# Patient Record
Sex: FEMALE | Race: WHITE | NOT HISPANIC OR LATINO | Employment: OTHER | URBAN - METROPOLITAN AREA
[De-identification: names, ages, dates, MRNs, and addresses within clinical notes are randomized per-mention and may not be internally consistent; named-entity substitution may affect disease eponyms.]

---

## 2018-10-10 ENCOUNTER — OFFICE VISIT (OUTPATIENT)
Dept: ORTHOPEDIC SURGERY | Facility: CLINIC | Age: 68
End: 2018-10-10

## 2018-10-10 VITALS
WEIGHT: 184 LBS | HEIGHT: 62 IN | BODY MASS INDEX: 33.86 KG/M2 | SYSTOLIC BLOOD PRESSURE: 147 MMHG | HEART RATE: 65 BPM | DIASTOLIC BLOOD PRESSURE: 79 MMHG

## 2018-10-10 DIAGNOSIS — M94.261 CHONDROMALACIA, KNEE, RIGHT: ICD-10-CM

## 2018-10-10 DIAGNOSIS — R52 PAIN: Primary | ICD-10-CM

## 2018-10-10 PROCEDURE — 73562 X-RAY EXAM OF KNEE 3: CPT | Performed by: ORTHOPAEDIC SURGERY

## 2018-10-10 PROCEDURE — 99203 OFFICE O/P NEW LOW 30 MIN: CPT | Performed by: ORTHOPAEDIC SURGERY

## 2018-10-10 PROCEDURE — 20610 DRAIN/INJ JOINT/BURSA W/O US: CPT | Performed by: ORTHOPAEDIC SURGERY

## 2018-10-10 RX ORDER — LISINOPRIL 20 MG/1
10 TABLET ORAL DAILY
COMMUNITY
Start: 2018-10-02 | End: 2021-09-13

## 2018-10-10 RX ORDER — DICLOFENAC SODIUM 75 MG/1
TABLET, DELAYED RELEASE ORAL
COMMUNITY
Start: 2018-10-02 | End: 2021-06-07

## 2018-10-10 RX ORDER — TRIAMCINOLONE ACETONIDE 40 MG/ML
80 INJECTION, SUSPENSION INTRA-ARTICULAR; INTRAMUSCULAR
Status: COMPLETED | OUTPATIENT
Start: 2018-10-10 | End: 2018-10-10

## 2018-10-10 RX ORDER — LIDOCAINE HYDROCHLORIDE 20 MG/ML
4 INJECTION, SOLUTION EPIDURAL; INFILTRATION; INTRACAUDAL; PERINEURAL
Status: COMPLETED | OUTPATIENT
Start: 2018-10-10 | End: 2018-10-10

## 2018-10-10 RX ORDER — AMLODIPINE BESYLATE 5 MG/1
TABLET ORAL
COMMUNITY
Start: 2018-10-02 | End: 2021-06-09 | Stop reason: ALTCHOICE

## 2018-10-10 RX ADMIN — LIDOCAINE HYDROCHLORIDE 4 ML: 20 INJECTION, SOLUTION EPIDURAL; INFILTRATION; INTRACAUDAL; PERINEURAL at 15:15

## 2018-10-10 RX ADMIN — TRIAMCINOLONE ACETONIDE 80 MG: 40 INJECTION, SUSPENSION INTRA-ARTICULAR; INTRAMUSCULAR at 15:15

## 2018-10-10 NOTE — PROGRESS NOTES
Subjective:Right knee pain.          Patient ID: Joan Block is a 68 y.o. female.    Chief Complaint:    History of Present Illness A 68-year-old female seen by me today for the first time regarding right knee pain that began in 06/2018. States she ran about 100 yards on a certain day and began developing moderate pain and discomfort in the knee that has persisted for the past 3-4 months. She has tried various antiinflammatories and has not gotten long-lasting and substantial relief. Moderate pain with sitting and with ambulating and navigating steps and even some mild discomfort at rest. Again, has taken 2 different antiinflammatories without any response. No prior history of trauma to the knee.            Social History     Occupational History   • Not on file.     Social History Main Topics   • Smoking status: Never Smoker   • Smokeless tobacco: Not on file   • Alcohol use No   • Drug use: Unknown   • Sexual activity: Not on file      Review of Systems   Constitutional: Negative for chills, diaphoresis, fever and unexpected weight change.   HENT: Negative for hearing loss, nosebleeds, sore throat and tinnitus.    Eyes: Negative for pain and visual disturbance.   Respiratory: Negative for cough, shortness of breath and wheezing.    Cardiovascular: Negative for chest pain and palpitations.   Gastrointestinal: Negative for abdominal pain, diarrhea, nausea and vomiting.   Endocrine: Negative for cold intolerance, heat intolerance and polydipsia.   Genitourinary: Negative for difficulty urinating, dysuria and hematuria.   Musculoskeletal: Positive for arthralgias and joint swelling. Negative for myalgias.   Skin: Negative for rash and wound.   Allergic/Immunologic: Negative for environmental allergies.   Neurological: Negative for dizziness, syncope and numbness.   Hematological: Bruises/bleeds easily.   Psychiatric/Behavioral: Negative for dysphoric mood and sleep disturbance. The patient is not nervous/anxious.           History reviewed. No pertinent past medical history.  Past Surgical History:   Procedure Laterality Date   • FOOT SURGERY     • KNEE SURGERY       Family History   Problem Relation Age of Onset   • Cancer Mother    • Diabetes Father          Objective:  Vitals:    10/10/18 1437   BP: 147/79   Pulse: 65     1    10/10/18  1437   Weight: 83.5 kg (184 lb)     Body mass index is 33.65 kg/m².        Ortho Exam   AP, lateral and sunrise views of the knee show minimal degenerative changes. Some slight decrease in the patellofemoral space on the sunrise. No prior x-rays available for comparison. She is alert and oriented x3. Head is normocephalic and sclerae are clear. The right knee shows no swelling, effusion or erythema. She has 0° to 130° of motion with minimal patellofemoral crepitus. No instability at 0° to 90° and no varus or valgus instability. No patellar subluxation. Quad function is 5/5 and her calf is nontender with negative Homans'. Good distal pulses and no motor sensory deficit. Good capillary refill. The skin is cool to touch. She has tolerated antiinflammatories in the past without any GI side effect. Complains of moderate pain that will interfere with most activities of daily living.          Assessment:        1. Pain    2. Chondromalacia, knee, right           Plan:Over 20 minutes were spent reviewing the patient's x-rays, physical findings and outlining the treatment plan going forward. She is taking multiple antiinflammatories with no relief of her symptoms so discussed physical therapy versus injection. We are going to try cortisone injection first. The knee was therefore injected with 4 mL of lidocaine and 1 mL of Kenalog through the superolateral portal after sterile prep without complications, tolerating it well. Post injection instructions given to the patient. Return to see me in a month.       Large Joint Arthrocentesis  Date/Time: 10/10/2018 3:15 PM  Consent given by: patient  Site  marked: site marked  Timeout: Immediately prior to procedure a time out was called to verify the correct patient, procedure, equipment, support staff and site/side marked as required   Supporting Documentation  Indications: pain   Procedure Details  Location: knee - R knee  Preparation: Patient was prepped and draped in the usual sterile fashion  Needle size: 22 G  Approach: anterolateral  Medications administered: 4 mL lidocaine PF 2% 2 %; 80 mg triamcinolone acetonide 40 MG/ML  Patient tolerance: patient tolerated the procedure well with no immediate complications                  Work Status:    GIANA query complete.    Orders:  Orders Placed This Encounter   Procedures   • Large Joint Arthrocentesis   • XR Knee 3+ View With [place] Right       Medications:  No orders of the defined types were placed in this encounter.      Followup:  Return in about 4 weeks (around 11/7/2018).          Dictated utilizing Dragon dictation

## 2018-11-14 ENCOUNTER — OFFICE VISIT (OUTPATIENT)
Dept: ORTHOPEDIC SURGERY | Facility: CLINIC | Age: 68
End: 2018-11-14

## 2018-11-14 VITALS — WEIGHT: 175 LBS | BODY MASS INDEX: 32.2 KG/M2 | HEIGHT: 62 IN

## 2018-11-14 DIAGNOSIS — M94.261 CHONDROMALACIA, KNEE, RIGHT: ICD-10-CM

## 2018-11-14 DIAGNOSIS — R52 PAIN: Primary | ICD-10-CM

## 2018-11-14 PROCEDURE — 20610 DRAIN/INJ JOINT/BURSA W/O US: CPT | Performed by: ORTHOPAEDIC SURGERY

## 2018-11-14 NOTE — PROGRESS NOTES
Subjective: Right knee pain     Patient ID: Joan Block is a 68 y.o. female.    Chief Complaint:    History of Present Illness 68-year-old female seen in follow-up to the cortisone injection given in the right knee.  His noted some improvement but a lot of the pain has returned after she is on her feet for any length of time she has significant pain and required her to set       Social History     Occupational History   • Not on file   Tobacco Use   • Smoking status: Never Smoker   Substance and Sexual Activity   • Alcohol use: No   • Drug use: Not on file   • Sexual activity: Not on file      Review of Systems   Constitutional: Negative for chills, diaphoresis, fever and unexpected weight change.   HENT: Negative for hearing loss, nosebleeds, sore throat and tinnitus.    Eyes: Negative for pain and visual disturbance.   Respiratory: Negative for cough, shortness of breath and wheezing.    Cardiovascular: Negative for chest pain and palpitations.   Gastrointestinal: Negative for abdominal pain, diarrhea, nausea and vomiting.   Endocrine: Negative for cold intolerance, heat intolerance and polydipsia.   Genitourinary: Negative for difficulty urinating, dysuria and hematuria.   Musculoskeletal: Positive for arthralgias and myalgias. Negative for joint swelling.   Skin: Negative for rash and wound.   Allergic/Immunologic: Negative for environmental allergies.   Neurological: Negative for dizziness, syncope and numbness.   Hematological: Does not bruise/bleed easily.   Psychiatric/Behavioral: Negative for dysphoric mood and sleep disturbance. The patient is not nervous/anxious.          No past medical history on file.  Past Surgical History:   Procedure Laterality Date   • FOOT SURGERY     • KNEE SURGERY       Family History   Problem Relation Age of Onset   • Cancer Mother    • Diabetes Father          Objective:  There were no vitals filed for this visit.      11/14/18  0959   Weight: 79.4 kg (175 lb)     Body  mass index is 32.01 kg/m².        Ortho Exam   she is alert and oriented ×3.  The knee shows no swelling effusion erythema but there is crepitus with range of motion but no instability.  No motor sensory deficit Is nontender.  Quad function 5 over 5.  Good capillary refill.  Skin is cool to touch.  She's been tolerating the Voltaren without any relief of her symptoms.    Assessment:        1. Pain    2. Chondromalacia, knee, right           Plan:Large Joint Arthrocentesis: R knee  Date/Time: 11/14/2018 10:05 AM  Consent given by: patient  Site marked: site marked  Timeout: Immediately prior to procedure a time out was called to verify the correct patient, procedure, equipment, support staff and site/side marked as required   Supporting Documentation  Indications: pain   Procedure Details  Location: knee - R knee  Preparation: Patient was prepped and draped in the usual sterile fashion  Needle size: 22 G  Approach: anterolateral  Medications administered: 30 mg Hyaluronan 30 MG/2ML  Patient tolerance: patient tolerated the procedure well with no immediate complications        After discussing treatment recommendations an option to proceed with the series of viscus supplement injections in the first one was given after sterile prep without, complications tolerating well            Work Status:    GIANA query complete.    Orders:  Orders Placed This Encounter   Procedures   • Large Joint Arthrocentesis: R knee       Medications:  No orders of the defined types were placed in this encounter.      Followup:  Return in about 1 week (around 11/21/2018).          Dictated utilizing Dragon dictation

## 2018-11-21 ENCOUNTER — CLINICAL SUPPORT (OUTPATIENT)
Dept: ORTHOPEDIC SURGERY | Facility: CLINIC | Age: 68
End: 2018-11-21

## 2018-11-21 VITALS — BODY MASS INDEX: 32.2 KG/M2 | HEIGHT: 62 IN | WEIGHT: 175 LBS

## 2018-11-21 DIAGNOSIS — M94.261 CHONDROMALACIA, KNEE, RIGHT: Primary | ICD-10-CM

## 2018-11-21 PROCEDURE — 20610 DRAIN/INJ JOINT/BURSA W/O US: CPT | Performed by: ORTHOPAEDIC SURGERY

## 2018-11-21 NOTE — PROGRESS NOTES
Subjective: osteoarthritis right knee     Patient ID: Joan Block is a 68 y.o. female.    Chief Complaint:    History of Present Illnesspatient seen for second viscus supplement injection.  Has noted a decrease in the pain following the first injection       Social History     Occupational History   • Not on file   Tobacco Use   • Smoking status: Never Smoker   • Smokeless tobacco: Never Used   Substance and Sexual Activity   • Alcohol use: No   • Drug use: No   • Sexual activity: Defer      Review of Systems   Constitutional: Negative for chills, diaphoresis, fever and unexpected weight change.   HENT: Negative for hearing loss, nosebleeds, sore throat and tinnitus.    Eyes: Negative for pain and visual disturbance.   Respiratory: Negative for cough, shortness of breath and wheezing.    Cardiovascular: Negative for chest pain and palpitations.   Gastrointestinal: Negative for abdominal pain, diarrhea, nausea and vomiting.   Endocrine: Negative for cold intolerance, heat intolerance and polydipsia.   Genitourinary: Negative for difficulty urinating, dysuria and hematuria.   Musculoskeletal: Positive for arthralgias and myalgias. Negative for joint swelling.   Skin: Negative for rash and wound.   Allergic/Immunologic: Negative for environmental allergies.   Neurological: Negative for dizziness, syncope and numbness.   Hematological: Does not bruise/bleed easily.   Psychiatric/Behavioral: Negative for dysphoric mood and sleep disturbance. The patient is not nervous/anxious.          History reviewed. No pertinent past medical history.  Past Surgical History:   Procedure Laterality Date   • FOOT SURGERY     • KNEE SURGERY       Family History   Problem Relation Age of Onset   • Cancer Mother    • Diabetes Father          Objective:  There were no vitals filed for this visit.      11/21/18  0800   Weight: 79.4 kg (175 lb)     Body mass index is 32.01 kg/m².        Ortho Exam   2 ml injection given through superior  lateral approach after sterile prep without complications.  She tolerated this well.  Post injection instructions were given to the patient.        Assessment:        1. Chondromalacia, knee, right           Plan:Return next week for a 3rd final injection.        Large Joint Arthrocentesis: R knee  Date/Time: 11/21/2018 8:01 AM  Consent given by: patient  Site marked: site marked  Timeout: Immediately prior to procedure a time out was called to verify the correct patient, procedure, equipment, support staff and site/side marked as required   Supporting Documentation  Indications: pain   Procedure Details  Location: knee - R knee  Preparation: Patient was prepped and draped in the usual sterile fashion  Needle size: 22 G  Approach: anterolateral  Medications administered: 30 mg Hyaluronan 30 MG/2ML  Patient tolerance: patient tolerated the procedure well with no immediate complications                Work Status:    GIANA query complete.    Orders:  Orders Placed This Encounter   Procedures   • Large Joint Arthrocentesis: R knee       Medications:  No orders of the defined types were placed in this encounter.      Followup:  Return in about 1 week (around 11/28/2018).          Dictated utilizing Dragon dictation

## 2018-11-28 ENCOUNTER — CLINICAL SUPPORT (OUTPATIENT)
Dept: ORTHOPEDIC SURGERY | Facility: CLINIC | Age: 68
End: 2018-11-28

## 2018-11-28 DIAGNOSIS — R52 PAIN: Primary | ICD-10-CM

## 2018-11-28 DIAGNOSIS — M94.261 CHONDROMALACIA, KNEE, RIGHT: ICD-10-CM

## 2018-11-28 PROCEDURE — 20610 DRAIN/INJ JOINT/BURSA W/O US: CPT | Performed by: ORTHOPAEDIC SURGERY

## 2018-11-28 NOTE — PROGRESS NOTES
Subjective: Right knee pain     Patient ID: Joan Block is a 68 y.o. female.    Chief Complaint:    History of Present Illness patient seen for third viscus supplement injection into her right knee.  His noted significant improvement in her pain and discomfort       Social History     Occupational History   • Not on file   Tobacco Use   • Smoking status: Never Smoker   • Smokeless tobacco: Never Used   Substance and Sexual Activity   • Alcohol use: No   • Drug use: No   • Sexual activity: Defer      Review of Systems   Constitutional: Negative for chills, diaphoresis, fever and unexpected weight change.   HENT: Negative for hearing loss, nosebleeds, sore throat and tinnitus.    Eyes: Negative for pain and visual disturbance.   Respiratory: Negative for cough, shortness of breath and wheezing.    Cardiovascular: Negative for chest pain and palpitations.   Gastrointestinal: Negative for abdominal pain, diarrhea, nausea and vomiting.   Endocrine: Negative for cold intolerance, heat intolerance and polydipsia.   Genitourinary: Negative for difficulty urinating, dysuria and hematuria.   Musculoskeletal: Positive for arthralgias and myalgias. Negative for joint swelling.   Skin: Negative for rash and wound.   Allergic/Immunologic: Negative for environmental allergies.   Neurological: Negative for dizziness, syncope and numbness.   Hematological: Does not bruise/bleed easily.   Psychiatric/Behavioral: Negative for dysphoric mood and sleep disturbance. The patient is not nervous/anxious.          No past medical history on file.  Past Surgical History:   Procedure Laterality Date   • FOOT SURGERY     • KNEE SURGERY       Family History   Problem Relation Age of Onset   • Cancer Mother    • Diabetes Father          Objective:  There were no vitals filed for this visit.  There were no vitals filed for this visit.  There is no height or weight on file to calculate BMI.        Ortho Exam   final 2 ML injection given to the  superior lateral portal after sterile prep without complications tolerating that well.  Postinjection instructions given to the patient.    Assessment:        1. Pain    2. Chondromalacia, knee, right           Plan:Large Joint Arthrocentesis: R knee  Date/Time: 11/28/2018 8:10 AM  Consent given by: patient  Site marked: site marked  Timeout: Immediately prior to procedure a time out was called to verify the correct patient, procedure, equipment, support staff and site/side marked as required   Supporting Documentation  Indications: pain   Procedure Details  Location: knee - R knee  Preparation: Patient was prepped and draped in the usual sterile fashion  Needle size: 22 G  Approach: lateral  Medications administered: 30 mg Hyaluronan 30 MG/2ML  Patient tolerance: patient tolerated the procedure well with no immediate complications        Return to see me as needed          Work Status:    GIANA query complete.    Orders:  Orders Placed This Encounter   Procedures   • Large Joint Arthrocentesis: R knee       Medications:  No orders of the defined types were placed in this encounter.      Followup:  Return if symptoms worsen or fail to improve.          Dictated utilizing Dragon dictation

## 2019-05-06 ENCOUNTER — TRANSCRIBE ORDERS (OUTPATIENT)
Dept: ADMINISTRATIVE | Facility: HOSPITAL | Age: 69
End: 2019-05-06

## 2019-05-06 DIAGNOSIS — Z12.39 SCREENING BREAST EXAMINATION: Primary | ICD-10-CM

## 2019-05-09 ENCOUNTER — HOSPITAL ENCOUNTER (OUTPATIENT)
Dept: MAMMOGRAPHY | Facility: HOSPITAL | Age: 69
Discharge: HOME | End: 2019-05-09
Admitting: NURSE PRACTITIONER

## 2019-05-09 DIAGNOSIS — Z12.39 SCREENING BREAST EXAMINATION: ICD-10-CM

## 2019-05-09 PROCEDURE — 77067 SCR MAMMO BI INCL CAD: CPT

## 2019-05-09 PROCEDURE — 77063 BREAST TOMOSYNTHESIS BI: CPT

## 2020-01-08 ENCOUNTER — OFFICE VISIT (OUTPATIENT)
Dept: ORTHOPEDIC SURGERY | Facility: CLINIC | Age: 70
End: 2020-01-08

## 2020-01-08 VITALS
HEIGHT: 62 IN | BODY MASS INDEX: 34.04 KG/M2 | SYSTOLIC BLOOD PRESSURE: 139 MMHG | WEIGHT: 185 LBS | DIASTOLIC BLOOD PRESSURE: 84 MMHG | HEART RATE: 91 BPM

## 2020-01-08 DIAGNOSIS — M94.262 CHONDROMALACIA OF LEFT KNEE: ICD-10-CM

## 2020-01-08 DIAGNOSIS — R52 PAIN: Primary | ICD-10-CM

## 2020-01-08 DIAGNOSIS — M94.261 CHONDROMALACIA, KNEE, RIGHT: ICD-10-CM

## 2020-01-08 PROCEDURE — 20610 DRAIN/INJ JOINT/BURSA W/O US: CPT | Performed by: ORTHOPAEDIC SURGERY

## 2020-01-08 PROCEDURE — 99214 OFFICE O/P EST MOD 30 MIN: CPT | Performed by: ORTHOPAEDIC SURGERY

## 2020-01-08 PROCEDURE — 73562 X-RAY EXAM OF KNEE 3: CPT | Performed by: ORTHOPAEDIC SURGERY

## 2020-01-08 RX ORDER — LIDOCAINE HYDROCHLORIDE 10 MG/ML
4 INJECTION, SOLUTION EPIDURAL; INFILTRATION; INTRACAUDAL; PERINEURAL
Status: COMPLETED | OUTPATIENT
Start: 2020-01-08 | End: 2020-01-08

## 2020-01-08 RX ORDER — TRIAMCINOLONE ACETONIDE 40 MG/ML
40 INJECTION, SUSPENSION INTRA-ARTICULAR; INTRAMUSCULAR
Status: COMPLETED | OUTPATIENT
Start: 2020-01-08 | End: 2020-01-08

## 2020-01-08 RX ADMIN — LIDOCAINE HYDROCHLORIDE 4 ML: 10 INJECTION, SOLUTION EPIDURAL; INFILTRATION; INTRACAUDAL; PERINEURAL at 15:06

## 2020-01-08 RX ADMIN — TRIAMCINOLONE ACETONIDE 40 MG: 40 INJECTION, SUSPENSION INTRA-ARTICULAR; INTRAMUSCULAR at 15:06

## 2020-01-08 NOTE — PROGRESS NOTES
Subjective: Left knee pain     Patient ID: Joan Block is a 69 y.o. female.    Chief Complaint:    History of Present Illness 69-year-old female known to me previously treated for chondromalacia of the right knee with a cortisone injection presents with a six-month history of left knee pain but worsened after a fall in December of last year.  Now having increasing discomfort in the knee that she describes as 2-3 out of 10 with throbbing and achy feeling with activity particular getting in and out of chair.  She has been taking the Voltaren  XR with no resolution of her symptoms     Social History     Occupational History   • Not on file   Tobacco Use   • Smoking status: Never Smoker   • Smokeless tobacco: Never Used   Substance and Sexual Activity   • Alcohol use: No   • Drug use: No   • Sexual activity: Defer      Review of Systems   Constitutional: Negative for chills, diaphoresis, fever and unexpected weight change.   HENT: Negative for hearing loss, nosebleeds, sore throat and tinnitus.    Eyes: Negative for pain and visual disturbance.   Respiratory: Negative for cough, shortness of breath and wheezing.    Cardiovascular: Negative for chest pain and palpitations.   Gastrointestinal: Negative for abdominal pain, diarrhea, nausea and vomiting.   Endocrine: Negative for cold intolerance, heat intolerance and polydipsia.   Genitourinary: Negative for difficulty urinating, dysuria and hematuria.   Musculoskeletal: Positive for arthralgias and myalgias. Negative for joint swelling.   Skin: Negative for rash and wound.   Allergic/Immunologic: Negative for environmental allergies.   Neurological: Negative for dizziness, syncope and numbness.   Hematological: Does not bruise/bleed easily.   Psychiatric/Behavioral: Negative for dysphoric mood and sleep disturbance. The patient is not nervous/anxious.          No past medical history on file.  Past Surgical History:   Procedure Laterality Date   • FOOT SURGERY     •  KNEE SURGERY       Family History   Problem Relation Age of Onset   • Cancer Mother    • Breast cancer Mother    • Diabetes Father          Objective:  Vitals:    01/08/20 1437   BP: 139/84   Pulse: 91         01/08/20  1437   Weight: 83.9 kg (185 lb)     Body mass index is 33.84 kg/m².        Ortho Exam   AP lateral sunrise view of the left knee is evaluated chief complaint does show medial arthrosis although minimal with minimal change in the patellofemoral joint.  No prior x-rays available for comparison.  She is alert and oriented x3.  The left knee shows no swelling effusion erythema but there is moderate crepitus with range of motion which is 0 to 125 degrees.  No joint line tenderness.  No instability at 0 90 degrees nor any instability 0 30 degrees of varus valgus stressing.  Quad function is 5/5 the calf is nontender.  Good distal pulses no motor or sensory deficit the skin is cool to touch.  She tolerates the Voltaren without any GI side effect but is noted no resolution of her symptoms.    Assessment:        1. Pain    2. Chondromalacia, knee, right    3. Chondromalacia of left knee           Plan: Over 50 minutes was spent with the patient reviewing her histories and her x-rays and physical exam.  She is developing chondromalacia in the left knee as she does had on the right knee and the knee right knee did well following the cortisone injection she is still asymptomatic at this time.  Strep reviewing treatment options with her and her proceed with a cortisone injection of the left knee with lidocaine Kenalog ratio 4:1.  Patient tolerated the injection without complication.  Continue taking the anti-inflammatory.  Return to see me as needed.  Answered all questions.  Large Joint Arthrocentesis: L knee  Date/Time: 1/8/2020 3:06 PM  Consent given by: patient  Site marked: site marked  Timeout: Immediately prior to procedure a time out was called to verify the correct patient, procedure, equipment, support  staff and site/side marked as required   Supporting Documentation  Indications: pain   Procedure Details  Location: knee - L knee  Preparation: Patient was prepped and draped in the usual sterile fashion  Needle size: 22 G  Approach: superior (LATERAL)  Medications administered: 4 mL lidocaine PF 1% 1 %; 40 mg triamcinolone acetonide 40 MG/ML  Patient tolerance: patient tolerated the procedure well with no immediate complications                  Work Status:    GIANA query complete.    Orders:  Orders Placed This Encounter   Procedures   • Large Joint Arthrocentesis: L knee   • XR Knee 3+ View With [place] Left       Medications:  No orders of the defined types were placed in this encounter.      Followup:  Return if symptoms worsen or fail to improve.          Dictated utilizing Dragon dictation

## 2020-06-15 ENCOUNTER — TRANSCRIBE ORDERS (OUTPATIENT)
Dept: ADMINISTRATIVE | Facility: HOSPITAL | Age: 70
End: 2020-06-15

## 2020-06-15 DIAGNOSIS — Z12.31 SCREENING MAMMOGRAM, ENCOUNTER FOR: Primary | ICD-10-CM

## 2020-07-29 ENCOUNTER — HOSPITAL ENCOUNTER (OUTPATIENT)
Dept: MAMMOGRAPHY | Facility: HOSPITAL | Age: 70
Discharge: HOME | End: 2020-07-29
Admitting: NURSE PRACTITIONER

## 2020-07-29 DIAGNOSIS — Z12.31 SCREENING MAMMOGRAM, ENCOUNTER FOR: ICD-10-CM

## 2020-07-29 PROCEDURE — 77063 BREAST TOMOSYNTHESIS BI: CPT

## 2020-07-29 PROCEDURE — 77067 SCR MAMMO BI INCL CAD: CPT

## 2021-06-07 ENCOUNTER — APPOINTMENT (OUTPATIENT)
Dept: GENERAL RADIOLOGY | Facility: HOSPITAL | Age: 71
End: 2021-06-07

## 2021-06-07 ENCOUNTER — HOSPITAL ENCOUNTER (EMERGENCY)
Facility: HOSPITAL | Age: 71
Discharge: HOME | End: 2021-06-07
Attending: EMERGENCY MEDICINE | Admitting: EMERGENCY MEDICINE

## 2021-06-07 ENCOUNTER — TELEPHONE (OUTPATIENT)
Dept: ORTHOPEDIC SURGERY | Facility: CLINIC | Age: 71
End: 2021-06-07

## 2021-06-07 VITALS
SYSTOLIC BLOOD PRESSURE: 184 MMHG | RESPIRATION RATE: 16 BRPM | OXYGEN SATURATION: 96 % | HEIGHT: 62 IN | TEMPERATURE: 98.3 F | WEIGHT: 180 LBS | HEART RATE: 103 BPM | DIASTOLIC BLOOD PRESSURE: 85 MMHG | BODY MASS INDEX: 33.13 KG/M2
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
## 2021-06-07 DIAGNOSIS — S52.612A TRAUMATIC CLOSED FRACTURE OF ULNAR STYLOID WITH MINIMAL DISPLACEMENT, LEFT, INITIAL ENCOUNTER: ICD-10-CM

## 2021-06-07 DIAGNOSIS — S52.502A CLOSED FRACTURE OF DISTAL END OF LEFT RADIUS, UNSPECIFIED FRACTURE MORPHOLOGY, INITIAL ENCOUNTER: Primary | ICD-10-CM

## 2021-06-07 LAB
ALBUMIN SERPL-MCNC: 4.4 G/DL (ref 3.5–5.2)
ALBUMIN/GLOB SERPL: 1.6 G/DL
ALP SERPL-CCNC: 70 U/L (ref 39–117)
ALT SERPL W P-5'-P-CCNC: 16 U/L (ref 1–33)
ANION GAP SERPL CALCULATED.3IONS-SCNC: 13.4 MMOL/L (ref 5–15)
AST SERPL-CCNC: 14 U/L (ref 1–32)
BASOPHILS # BLD AUTO: 0.06 10*3/MM3 (ref 0–0.2)
BASOPHILS NFR BLD AUTO: 0.6 % (ref 0–1.5)
BILIRUB SERPL-MCNC: 0.4 MG/DL (ref 0–1.2)
BUN SERPL-MCNC: 27 MG/DL (ref 8–23)
BUN/CREAT SERPL: 32.5 (ref 7–25)
CALCIUM SPEC-SCNC: 10 MG/DL (ref 8.6–10.5)
CHLORIDE SERPL-SCNC: 105 MMOL/L (ref 98–107)
CO2 SERPL-SCNC: 20.6 MMOL/L (ref 22–29)
CREAT SERPL-MCNC: 0.83 MG/DL (ref 0.57–1)
DEPRECATED RDW RBC AUTO: 50.4 FL (ref 37–54)
EOSINOPHIL # BLD AUTO: 0.08 10*3/MM3 (ref 0–0.4)
EOSINOPHIL NFR BLD AUTO: 0.8 % (ref 0.3–6.2)
ERYTHROCYTE [DISTWIDTH] IN BLOOD BY AUTOMATED COUNT: 14.7 % (ref 12.3–15.4)
GFR SERPL CREATININE-BSD FRML MDRD: 68 ML/MIN/1.73
GLOBULIN UR ELPH-MCNC: 2.8 GM/DL
GLUCOSE SERPL-MCNC: 121 MG/DL (ref 65–99)
HCT VFR BLD AUTO: 44.9 % (ref 34–46.6)
HGB BLD-MCNC: 13.8 G/DL (ref 12–15.9)
IMM GRANULOCYTES # BLD AUTO: 0.06 10*3/MM3 (ref 0–0.05)
IMM GRANULOCYTES NFR BLD AUTO: 0.6 % (ref 0–0.5)
LARGE PLATELETS: NORMAL
LYMPHOCYTES # BLD AUTO: 1.41 10*3/MM3 (ref 0.7–3.1)
LYMPHOCYTES NFR BLD AUTO: 14.3 % (ref 19.6–45.3)
MCH RBC QN AUTO: 28.4 PG (ref 26.6–33)
MCHC RBC AUTO-ENTMCNC: 30.7 G/DL (ref 31.5–35.7)
MCV RBC AUTO: 92.4 FL (ref 79–97)
MONOCYTES # BLD AUTO: 0.66 10*3/MM3 (ref 0.1–0.9)
MONOCYTES NFR BLD AUTO: 6.7 % (ref 5–12)
NEUTROPHILS NFR BLD AUTO: 7.61 10*3/MM3 (ref 1.7–7)
NEUTROPHILS NFR BLD AUTO: 77 % (ref 42.7–76)
NRBC BLD AUTO-RTO: 0 /100 WBC (ref 0–0.2)
PLATELET # BLD AUTO: 197 10*3/MM3 (ref 140–450)
PMV BLD AUTO: 10.9 FL (ref 6–12)
POTASSIUM SERPL-SCNC: 4.7 MMOL/L (ref 3.5–5.2)
PROT SERPL-MCNC: 7.2 G/DL (ref 6–8.5)
QT INTERVAL: 390 MS
RBC # BLD AUTO: 4.86 10*6/MM3 (ref 3.77–5.28)
RBC MORPH BLD: NORMAL
SODIUM SERPL-SCNC: 139 MMOL/L (ref 136–145)
WBC # BLD AUTO: 9.88 10*3/MM3 (ref 3.4–10.8)
WBC MORPH BLD: NORMAL

## 2021-06-07 PROCEDURE — 85025 COMPLETE CBC W/AUTO DIFF WBC: CPT | Performed by: EMERGENCY MEDICINE

## 2021-06-07 PROCEDURE — 80053 COMPREHEN METABOLIC PANEL: CPT | Performed by: EMERGENCY MEDICINE

## 2021-06-07 PROCEDURE — 93005 ELECTROCARDIOGRAM TRACING: CPT | Performed by: EMERGENCY MEDICINE

## 2021-06-07 PROCEDURE — 99283 EMERGENCY DEPT VISIT LOW MDM: CPT

## 2021-06-07 PROCEDURE — 93010 ELECTROCARDIOGRAM REPORT: CPT | Performed by: INTERNAL MEDICINE

## 2021-06-07 PROCEDURE — 99283 EMERGENCY DEPT VISIT LOW MDM: CPT | Performed by: EMERGENCY MEDICINE

## 2021-06-07 PROCEDURE — 71046 X-RAY EXAM CHEST 2 VIEWS: CPT

## 2021-06-07 PROCEDURE — 73110 X-RAY EXAM OF WRIST: CPT

## 2021-06-07 PROCEDURE — 85007 BL SMEAR W/DIFF WBC COUNT: CPT | Performed by: EMERGENCY MEDICINE

## 2021-06-07 RX ORDER — ATORVASTATIN CALCIUM 40 MG/1
40 TABLET, FILM COATED ORAL DAILY
COMMUNITY
End: 2021-06-09 | Stop reason: ALTCHOICE

## 2021-06-07 RX ORDER — HYDROCODONE BITARTRATE AND ACETAMINOPHEN 5; 325 MG/1; MG/1
1 TABLET ORAL EVERY 4 HOURS PRN
Qty: 30 TABLET | Refills: 0 | Status: ON HOLD | OUTPATIENT
Start: 2021-06-07 | End: 2021-06-15

## 2021-06-07 RX ORDER — HYDROCODONE BITARTRATE AND ACETAMINOPHEN 5; 325 MG/1; MG/1
1 TABLET ORAL EVERY 4 HOURS PRN
Qty: 30 TABLET | Refills: 0 | Status: SHIPPED | OUTPATIENT
Start: 2021-06-07 | End: 2021-06-07

## 2021-06-07 RX ORDER — HYDROCODONE BITARTRATE AND ACETAMINOPHEN 5; 325 MG/1; MG/1
1 TABLET ORAL ONCE
Status: COMPLETED | OUTPATIENT
Start: 2021-06-07 | End: 2021-06-07

## 2021-06-07 RX ADMIN — HYDROCODONE BITARTRATE AND ACETAMINOPHEN 1 TABLET: 5; 325 TABLET ORAL at 09:50

## 2021-06-07 NOTE — TELEPHONE ENCOUNTER
LM FOR PATIENT TO CALL AND MAKE APPOINTMENT, DR. MIRANDA SAID HE WILL SEE HER FOR HER WRIST THIS Wednesday EITHER 9 OR 10.

## 2021-06-07 NOTE — ED NOTES
Wedding and engagement rings spread and removed after being cut with manual ring cutter on the centerline on bottom.       Luis Wynn RN  06/07/21 [id number]

## 2021-06-07 NOTE — ED PROVIDER NOTES
Subjective   History of Present Illness  History of Present Illness    Chief complaint: Fall with wrist pain    Location: Left wrist    Quality/Severity: Moderate, sharp pain    Timing/Onset/Duration: Acute onset last night    Modifying Factors: It hurts to move, feels better to remain still    Associated Symptoms: No numbness, tingling, or weakness.  There is swelling.  There is no change in color or temperature.  She has no other complaints.    Narrative: This 70-year-old white female, who is not on blood thinners, fell last night when a dog she was walking started chasing a rabbit.    PCP:Felicia Ruth APRN      Review of Systems   Musculoskeletal:        Left wrist pain and swelling   Neurological: Negative for weakness and numbness.        Medication List      ASK your doctor about these medications    amLODIPine 5 MG tablet  Commonly known as: NORVASC     diclofenac 75 MG EC tablet  Commonly known as: VOLTAREN     lisinopril 20 MG tablet  Commonly known as: PRINIVIL,ZESTRIL            No past medical history on file.    No Known Allergies    Past Surgical History:   Procedure Laterality Date   • FOOT SURGERY     • KNEE SURGERY         Family History   Problem Relation Age of Onset   • Cancer Mother    • Breast cancer Mother    • Diabetes Father        Social History     Socioeconomic History   • Marital status:      Spouse name: Not on file   • Number of children: Not on file   • Years of education: Not on file   • Highest education level: Not on file   Tobacco Use   • Smoking status: Never Smoker   • Smokeless tobacco: Never Used   Substance and Sexual Activity   • Alcohol use: No   • Drug use: No   • Sexual activity: Defer           Objective   Physical Exam  Vitals and nursing note reviewed.   Constitutional:       Appearance: Normal appearance.   Musculoskeletal:      Comments: There is tenderness about the dorsal aspect fact on the radial side of the left wrist.  There is swelling.  The capillary  refill is 2 seconds.  The sensation is intact.  There is decreased range of motion secondary to pain.  There is no joint laxity noted.  There is 2+ radial pulse.   Skin:     General: Skin is warm and dry.      Capillary Refill: Capillary refill takes less than 2 seconds.      Coloration: Skin is not pale.      Findings: No bruising.   Neurological:      Mental Status: She is alert and oriented to person, place, and time.      Sensory: No sensory deficit.      Motor: No weakness.         Procedures           ED Course      09:33 EDT, 06/07/21:  The patient had a thumb spica applied by the technician.  After location of the splint, it was assessed by me and noted to be in good position with the right upper extremity being neurovascularly intact.    09:33 EDT, 06/07/21:  Patient's diagnosis of fall with left t radial fracture and ulnar styloid fracture was discussed with her patient should call Dr. Brown this morning for a follow-up appointment within 1 week.  She should elevate the left wrist on a pillow above the heart.  She should ice it for 20 minutes every 2 hours while she is awake for 2 to 3 days.  The patient should take Colace as needed as directed for constipation if she is taking the Norco for pain.  Patient will be given a Norco here in the emergency department.  The patient should return to the emergency department if there is increased pain, numbness, tingling, weakness, change in color or temperature, worse in any way at all.  The patient's questions were answered she will be discharged in good condition.    10:09 EDT, 06/07/21:  Dr. Brown called and states that he will see the patient in the office on Wednesday morning.  He has requested that we get the preop labs EKG and chest x-ray prior to the patient leaving the emergency department.  He plans on operative repair of the fracture Friday morning.    10:17 EDT, 06/07/21:  The EKG was obtained at 1015 and read by me at 1017.  EKG shows a normal sinus  rhythm with rate of 74.  There is a left anterior fascicular block.  There is no hypertrophy.  The IN, QRS, and QT intervals are unremarkable.  There is probable left atrial enlargement.  There is poor anterior R wave progression.  There is no acute ST elevation or depression    11:17 EDT, 06/07/21:  [facility] and stated that they do not have any [place] in stock but the [facility] in [place] does.  The [facility] canceled the prescription and [facility].                                     MDM    Final diagnoses:   Fall, initial encounter   Closed fracture of distal end of left radius, unspecified fracture morphology, initial encounter   Traumatic closed fracture of ulnar styloid with minimal displacement, left, initial encounter       ED Disposition  ED Disposition     None          No follow-up provider specified.       Medication List      No changes were made to your prescriptions during this visit.          Donnie Delcid MD  06/07/21 3329

## 2021-06-09 ENCOUNTER — PRE-ADMISSION TESTING (OUTPATIENT)
Dept: PREADMISSION TESTING | Facility: HOSPITAL | Age: 71
End: 2021-06-09

## 2021-06-09 ENCOUNTER — ANESTHESIA EVENT (OUTPATIENT)
Dept: PERIOP | Facility: HOSPITAL | Age: 71
End: 2021-06-09

## 2021-06-09 ENCOUNTER — HOSPITAL ENCOUNTER (OUTPATIENT)
Facility: HOSPITAL | Age: 71
Setting detail: OBSERVATION
End: 2021-06-09
Attending: ORTHOPAEDIC SURGERY | Admitting: ORTHOPAEDIC SURGERY

## 2021-06-09 ENCOUNTER — PREP FOR SURGERY (OUTPATIENT)
Dept: OTHER | Facility: HOSPITAL | Age: 71
End: 2021-06-09

## 2021-06-09 ENCOUNTER — TRANSCRIBE ORDERS (OUTPATIENT)
Dept: ADMINISTRATIVE | Facility: HOSPITAL | Age: 71
End: 2021-06-09

## 2021-06-09 ENCOUNTER — TELEPHONE (OUTPATIENT)
Dept: ORTHOPEDIC SURGERY | Facility: CLINIC | Age: 71
End: 2021-06-09

## 2021-06-09 ENCOUNTER — OFFICE VISIT (OUTPATIENT)
Dept: ORTHOPEDIC SURGERY | Facility: CLINIC | Age: 71
End: 2021-06-09

## 2021-06-09 VITALS — HEIGHT: 62 IN | BODY MASS INDEX: 33.13 KG/M2 | WEIGHT: 180 LBS

## 2021-06-09 VITALS
DIASTOLIC BLOOD PRESSURE: 84 MMHG | WEIGHT: 183 LBS | SYSTOLIC BLOOD PRESSURE: 152 MMHG | RESPIRATION RATE: 20 BRPM | HEIGHT: 62 IN | BODY MASS INDEX: 33.68 KG/M2 | OXYGEN SATURATION: 94 % | HEART RATE: 88 BPM

## 2021-06-09 DIAGNOSIS — S52.532A CLOSED COLLES' FRACTURE OF LEFT RADIUS, INITIAL ENCOUNTER: Primary | ICD-10-CM

## 2021-06-09 DIAGNOSIS — U07.1 COVID-19: Primary | ICD-10-CM

## 2021-06-09 DIAGNOSIS — S52.502A CLOSED FRACTURE OF DISTAL END OF LEFT RADIUS, UNSPECIFIED FRACTURE MORPHOLOGY, INITIAL ENCOUNTER: Primary | ICD-10-CM

## 2021-06-09 PROBLEM — S52.509A DISTAL RADIAL FRACTURE: Status: ACTIVE | Noted: 2021-06-09

## 2021-06-09 PROCEDURE — 99214 OFFICE O/P EST MOD 30 MIN: CPT | Performed by: ORTHOPAEDIC SURGERY

## 2021-06-09 RX ORDER — ACETAMINOPHEN 500 MG
500 TABLET ORAL EVERY 4 HOURS PRN
COMMUNITY
End: 2021-09-13

## 2021-06-09 RX ORDER — AMLODIPINE BESYLATE 10 MG/1
10 TABLET ORAL DAILY
COMMUNITY
Start: 2021-05-09 | End: 2021-08-25

## 2021-06-09 RX ORDER — ATORVASTATIN CALCIUM 10 MG/1
10 TABLET, FILM COATED ORAL DAILY
COMMUNITY
Start: 2021-05-30 | End: 2022-01-04 | Stop reason: SDUPTHER

## 2021-06-09 RX ORDER — TRAMADOL HYDROCHLORIDE 50 MG/1
50 TABLET ORAL EVERY 4 HOURS PRN
Qty: 30 TABLET | Refills: 0 | Status: SHIPPED | OUTPATIENT
Start: 2021-06-09 | End: 2021-06-17 | Stop reason: SDUPTHER

## 2021-06-09 RX ORDER — CEFAZOLIN SODIUM 2 G/50ML
2 SOLUTION INTRAVENOUS ONCE
Status: CANCELLED | OUTPATIENT
Start: 2021-06-15

## 2021-06-09 NOTE — PROGRESS NOTES
Subjective: Left wrist pain     Patient ID: Joan Block is a 70 y.o. female.    Chief Complaint:    History of Present Illness 70-year-old female known to me is seen for new injury involving her left dominant wrist when she fell Sunday while walking her dog.  Apparently the dog saw a rabbit and pulled her she fell injuring the left wrist developed immediate pain discomfort.  Was seen in the emergency room x-rays showed a displaced intra-articular left Colles' fracture and splinted now presents to the office for definitive care.  Denies any prior history of wrist pain or injury.       Social History     Occupational History   • Not on file   Tobacco Use   • Smoking status: Never Smoker   • Smokeless tobacco: Never Used   Substance and Sexual Activity   • Alcohol use: No   • Drug use: No   • Sexual activity: Defer      Review of Systems   Constitutional: Negative for chills, diaphoresis and unexpected weight change.   HENT: Negative for hearing loss, nosebleeds, sore throat and tinnitus.    Eyes: Negative for pain and visual disturbance.   Respiratory: Negative for cough, shortness of breath and wheezing.    Cardiovascular: Negative for chest pain and palpitations.   Gastrointestinal: Negative for abdominal pain, diarrhea, nausea and vomiting.   Endocrine: Negative for cold intolerance, heat intolerance and polydipsia.   Genitourinary: Negative for difficulty urinating, dyspareunia and hematuria.   Musculoskeletal: Positive for myalgias. Negative for arthralgias.   Skin: Negative for rash and wound.   Allergic/Immunologic: Negative for environmental allergies.   Neurological: Negative for dizziness, syncope and numbness.   Hematological: Does not bruise/bleed easily.   Psychiatric/Behavioral: Negative for dysphoric mood and sleep disturbance. The patient is not nervous/anxious.            Objective:      Ortho Exam   Reviewed the x-rays done at the hospital on Sunday AP lateral oblique show a displaced  intra-articular left distal radial fracture.  There is radial shortening.  She also has a ulnar styloid fracture no prior x-rays available for comparison.  She is alert and oriented x3.  Has normocephalic and sclerae clear.  She has no motor or sensory deficit and she has good capillary refill.  Examination of the wrist out of the splint does show some ecchymosis on the volar aspect of the wrist.  The skin is cool to touch.  There is swelling and pain to palpation at the fracture site as expected.  Range of motion of the elbow particular pronation supination elicits pain.  She is taken Tylenol for pain control.    Assessment:        1. Closed Colles' fracture of left radius, initial encounter           Plan: Reviewed the x-rays with the patient and her son her history and physical exam.  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  Discussed treatment options of operative versus nonoperative management.  Nonoperative management will allow for fracture healing but would allow for further displacement post healing arthritis and restricted range of motion.  Discussed surgery open reduction internal fixation.  Discussed the risk associated with surgery to include infection, nerve injury paresthesia paralysis, vascular injury, tendon or muscle injury, postsurgical arthritis and stiffness, persistent pain and discomfort and RSD.  Discussed the rehab length of healing which could be 3 to 6 months.  Patient like to proceed with surgery as it is her dominant wrist.  We will give preoperative evaluation and tentatively try to set up surgery for next Tuesday.  Answered all questions                      Dictated utilizing Dragon dictation

## 2021-06-12 ENCOUNTER — LAB (OUTPATIENT)
Dept: LAB | Facility: HOSPITAL | Age: 71
End: 2021-06-12

## 2021-06-12 DIAGNOSIS — U07.1 COVID-19: ICD-10-CM

## 2021-06-14 ENCOUNTER — HOSPITAL ENCOUNTER (OUTPATIENT)
Facility: HOSPITAL | Age: 71
Discharge: HOME | End: 2021-06-15
Attending: ORTHOPAEDIC SURGERY | Admitting: ORTHOPAEDIC SURGERY

## 2021-06-14 ENCOUNTER — APPOINTMENT (OUTPATIENT)
Dept: GENERAL RADIOLOGY | Facility: HOSPITAL | Age: 71
End: 2021-06-14

## 2021-06-14 DIAGNOSIS — S52.502A CLOSED FRACTURE OF DISTAL END OF LEFT RADIUS, UNSPECIFIED FRACTURE MORPHOLOGY, INITIAL ENCOUNTER: ICD-10-CM

## 2021-06-14 PROBLEM — S52.532A FRACTURE, COLLES, LEFT, CLOSED: Status: ACTIVE | Noted: 2021-06-14

## 2021-06-14 LAB
BASOPHILS # BLD AUTO: 0.07 10*3/MM3 (ref 0–0.2)
BASOPHILS NFR BLD AUTO: 0.9 % (ref 0–1.5)
BILIRUB UR QL STRIP: NEGATIVE
CLARITY UR: CLEAR
COLOR UR: YELLOW
DEPRECATED RDW RBC AUTO: 46.3 FL (ref 37–54)
EOSINOPHIL # BLD AUTO: 0.18 10*3/MM3 (ref 0–0.4)
EOSINOPHIL NFR BLD AUTO: 2.4 % (ref 0.3–6.2)
ERYTHROCYTE [DISTWIDTH] IN BLOOD BY AUTOMATED COUNT: 14.1 % (ref 12.3–15.4)
GLUCOSE UR STRIP-MCNC: NEGATIVE MG/DL
HCT VFR BLD AUTO: 42.8 % (ref 34–46.6)
HGB BLD-MCNC: 13.5 G/DL (ref 12–15.9)
HGB UR QL STRIP.AUTO: NEGATIVE
IMM GRANULOCYTES # BLD AUTO: 0.04 10*3/MM3 (ref 0–0.05)
IMM GRANULOCYTES NFR BLD AUTO: 0.5 % (ref 0–0.5)
KETONES UR QL STRIP: NEGATIVE
LEUKOCYTE ESTERASE UR QL STRIP.AUTO: NEGATIVE
LYMPHOCYTES # BLD AUTO: 1.65 10*3/MM3 (ref 0.7–3.1)
LYMPHOCYTES NFR BLD AUTO: 22 % (ref 19.6–45.3)
MCH RBC QN AUTO: 27.9 PG (ref 26.6–33)
MCHC RBC AUTO-ENTMCNC: 31.5 G/DL (ref 31.5–35.7)
MCV RBC AUTO: 88.4 FL (ref 79–97)
MONOCYTES # BLD AUTO: 0.54 10*3/MM3 (ref 0.1–0.9)
MONOCYTES NFR BLD AUTO: 7.2 % (ref 5–12)
NEUTROPHILS NFR BLD AUTO: 5.01 10*3/MM3 (ref 1.7–7)
NEUTROPHILS NFR BLD AUTO: 67 % (ref 42.7–76)
NITRITE UR QL STRIP: NEGATIVE
PH UR STRIP.AUTO: <=5 [PH] (ref 4.5–8)
PLATELET # BLD AUTO: 267 10*3/MM3 (ref 140–450)
PMV BLD AUTO: 10.2 FL (ref 6–12)
PROT UR QL STRIP: NEGATIVE
QT INTERVAL: 375 MS
RBC # BLD AUTO: 4.84 10*6/MM3 (ref 3.77–5.28)
SARS-COV-2 RNA PNL SPEC NAA+PROBE: NOT DETECTED
SP GR UR STRIP: 1.01 (ref 1–1.03)
UROBILINOGEN UR QL STRIP: NORMAL
WBC # BLD AUTO: 7.49 10*3/MM3 (ref 3.4–10.8)

## 2021-06-14 PROCEDURE — 87635 SARS-COV-2 COVID-19 AMP PRB: CPT | Performed by: ORTHOPAEDIC SURGERY

## 2021-06-14 PROCEDURE — A9270 NON-COVERED ITEM OR SERVICE: HCPCS | Performed by: ORTHOPAEDIC SURGERY

## 2021-06-14 PROCEDURE — 63710000001 ACETAMINOPHEN 500 MG TABLET: Performed by: ORTHOPAEDIC SURGERY

## 2021-06-14 PROCEDURE — 93005 ELECTROCARDIOGRAM TRACING: CPT | Performed by: ORTHOPAEDIC SURGERY

## 2021-06-14 PROCEDURE — 85025 COMPLETE CBC W/AUTO DIFF WBC: CPT | Performed by: ORTHOPAEDIC SURGERY

## 2021-06-14 PROCEDURE — 71046 X-RAY EXAM CHEST 2 VIEWS: CPT

## 2021-06-14 PROCEDURE — 99202 OFFICE O/P NEW SF 15 MIN: CPT | Performed by: NURSE PRACTITIONER

## 2021-06-14 PROCEDURE — S0260 H&P FOR SURGERY: HCPCS | Performed by: ORTHOPAEDIC SURGERY

## 2021-06-14 PROCEDURE — 81003 URINALYSIS AUTO W/O SCOPE: CPT | Performed by: ORTHOPAEDIC SURGERY

## 2021-06-14 PROCEDURE — 93010 ELECTROCARDIOGRAM REPORT: CPT | Performed by: INTERNAL MEDICINE

## 2021-06-14 PROCEDURE — G0378 HOSPITAL OBSERVATION PER HR: HCPCS

## 2021-06-14 RX ORDER — ONDANSETRON 4 MG/1
4 TABLET, FILM COATED ORAL EVERY 6 HOURS PRN
Status: DISCONTINUED | OUTPATIENT
Start: 2021-06-14 | End: 2021-09-13

## 2021-06-14 RX ORDER — TRAMADOL HYDROCHLORIDE 50 MG/1
50 TABLET ORAL EVERY 6 HOURS PRN
Status: DISCONTINUED | OUTPATIENT
Start: 2021-06-14 | End: 2021-06-15 | Stop reason: SDUPTHER

## 2021-06-14 RX ORDER — ACETAMINOPHEN 500 MG
500 TABLET ORAL EVERY 4 HOURS PRN
Status: DISCONTINUED | OUTPATIENT
Start: 2021-06-14 | End: 2021-06-14 | Stop reason: CLARIF

## 2021-06-14 RX ORDER — SODIUM CHLORIDE 9 MG/ML
40 INJECTION, SOLUTION INTRAVENOUS AS NEEDED
Status: DISCONTINUED | OUTPATIENT
Start: 2021-06-14 | End: 2021-06-15 | Stop reason: HOSPADM

## 2021-06-14 RX ORDER — ONDANSETRON 4 MG/1
4 TABLET, FILM COATED ORAL EVERY 6 HOURS PRN
Status: DISCONTINUED | OUTPATIENT
Start: 2021-06-14 | End: 2021-06-15 | Stop reason: HOSPADM

## 2021-06-14 RX ORDER — SODIUM CHLORIDE, SODIUM LACTATE, POTASSIUM CHLORIDE, CALCIUM CHLORIDE 600; 310; 30; 20 MG/100ML; MG/100ML; MG/100ML; MG/100ML
9 INJECTION, SOLUTION INTRAVENOUS CONTINUOUS
Status: CANCELLED | OUTPATIENT
Start: 2021-06-14

## 2021-06-14 RX ORDER — HYDROCODONE BITARTRATE AND ACETAMINOPHEN 7.5; 325 MG/1; MG/1
2 TABLET ORAL EVERY 4 HOURS PRN
Status: ACTIVE | OUTPATIENT
Start: 2021-06-14 | End: 2021-06-21

## 2021-06-14 RX ORDER — HYDROCODONE BITARTRATE AND ACETAMINOPHEN 7.5; 325 MG/1; MG/1
2 TABLET ORAL EVERY 4 HOURS PRN
Status: DISCONTINUED | OUTPATIENT
Start: 2021-06-14 | End: 2021-06-15

## 2021-06-14 RX ORDER — SODIUM CHLORIDE 0.9 % (FLUSH) 0.9 %
10 SYRINGE (ML) INJECTION EVERY 12 HOURS SCHEDULED
Status: DISCONTINUED | OUTPATIENT
Start: 2021-06-14 | End: 2021-09-13

## 2021-06-14 RX ORDER — SODIUM CHLORIDE 9 MG/ML
40 INJECTION, SOLUTION INTRAVENOUS AS NEEDED
Status: DISCONTINUED | OUTPATIENT
Start: 2021-06-14 | End: 2021-09-13

## 2021-06-14 RX ORDER — SODIUM CHLORIDE 0.9 % (FLUSH) 0.9 %
10 SYRINGE (ML) INJECTION AS NEEDED
Status: DISCONTINUED | OUTPATIENT
Start: 2021-06-14 | End: 2021-09-13

## 2021-06-14 RX ORDER — SODIUM CHLORIDE 0.9 % (FLUSH) 0.9 %
10 SYRINGE (ML) INJECTION AS NEEDED
Status: DISCONTINUED | OUTPATIENT
Start: 2021-06-14 | End: 2021-06-15 | Stop reason: HOSPADM

## 2021-06-14 RX ORDER — SODIUM CHLORIDE 0.9 % (FLUSH) 0.9 %
10 SYRINGE (ML) INJECTION EVERY 12 HOURS SCHEDULED
Status: DISCONTINUED | OUTPATIENT
Start: 2021-06-14 | End: 2021-06-15 | Stop reason: HOSPADM

## 2021-06-14 RX ADMIN — SODIUM CHLORIDE, PRESERVATIVE FREE 10 ML: 5 INJECTION INTRAVENOUS at 20:20

## 2021-06-14 RX ADMIN — ACETAMINOPHEN 500 MG: 500 TABLET, FILM COATED ORAL at 15:11

## 2021-06-14 NOTE — CONSULTS
[facility]        Patient Name: Joan Block  Age/Sex: 70 y.o. female  : 1950  MRN: [id number]    Date of Admission: 2021  Date of Encounter Visit: 21  Encounter Provider: TRU Cloud  Referring Provider: Norman Brown MD  Place of Service: [facility] CARDIOLOGY  Patient Care Team:  Gwen Olson APRN as PCP - General (Nurse Practitioner)    Subjective:     Chief Complaint: Fall with wrist fracture    History of Present Illness:  Joan Block is a 70 y.o. female with a history of hypertension.     She was walking her dog about 8 days ago and fell when it took off to eulalia a rabbit. She was seen in the ER and found to have a displaced intra-articular left colles' fracture.     She was admitted for pre op clearance for an open reduction total fixation tomorrow with Dr. Hong.     Any chest pain or pressure.  She not had any shortness of breath on exertion.  She has had no symptoms of PND or orthopnea.  She has not had any lower extremity edema.  She has no history of coronary artery disease or congestive heart failure.  Her primary care provider manages her hypertension.  She was feeling great up into this point her blood pressure is typically well controlled at home.      Past Medical History:  Past Medical History:   Diagnosis Date   • Hyperlipidemia    • Hypertension        Past Surgical History:   Procedure Laterality Date   • APPENDECTOMY     • FOOT SURGERY     • KNEE SURGERY         Home Medications:   Medications Prior to Admission   Medication Sig Dispense Refill Last Dose   • acetaminophen (TYLENOL) 500 MG tablet Take 500 mg by mouth Every 4 (Four) Hours As Needed for Mild Pain  or Fever.      • amLODIPine (NORVASC) 10 MG tablet Take 10 mg by mouth Daily.      • atorvastatin (LIPITOR) 10 MG tablet Take 10 mg by mouth Daily.      • HYDROcodone-acetaminophen (NORCO) 5-325 MG per tablet Take 1 tablet by mouth Every 4  (Four) Hours As Needed for Moderate Pain . 30 tablet 0    • lisinopril (PRINIVIL,ZESTRIL) 20 MG tablet Take 10 mg by mouth Daily.      • traMADol (ULTRAM) 50 MG tablet Take 1 tablet by mouth Every 4 (Four) Hours As Needed for Moderate Pain . 30 tablet 0        Allergies:  No Known Allergies    Past Social History:  Social History     Socioeconomic History   • Marital status:      Spouse name: Not on file   • Number of children: Not on file   • Years of education: Not on file   • Highest education level: Not on file   Tobacco Use   • Smoking status: Never Smoker   • Smokeless tobacco: Never Used   Vaping Use   • Vaping Use: Never used   Substance and Sexual Activity   • Alcohol use: No   • Drug use: No   • Sexual activity: Defer       Past Family History:  Family History   Problem Relation Age of Onset   • Cancer Mother    • Breast cancer Mother    • Diabetes Father        Review of Systems   Constitutional: Negative for chills, decreased appetite, diaphoresis, fever and malaise/fatigue.   HENT: Negative for nosebleeds.    Eyes: Negative for blurred vision.   Cardiovascular: Negative for chest pain, claudication, cyanosis, dyspnea on exertion, irregular heartbeat, leg swelling, near-syncope, orthopnea, palpitations, paroxysmal nocturnal dyspnea and syncope.   Respiratory: Negative for cough, hemoptysis, shortness of breath, sleep disturbances due to breathing, snoring and wheezing.    Hematologic/Lymphatic: Negative for bleeding problem. Does not bruise/bleed easily.   Musculoskeletal: Negative for falls (mechanical fall while walking her dog. ).   Gastrointestinal: Negative for heartburn.   Neurological: Negative for excessive daytime sleepiness, dizziness, headaches, light-headedness, numbness and weakness.         Objective:   Temp:  [97 °F (36.1 °C)] 97 °F (36.1 °C)  Heart Rate:  [75] 75  Resp:  [16] 16  BP: (143)/(67) 143/67     Intake/Output Summary (Last 24 hours) at 6/14/2021 1325  Last data filed at  6/14/2021 1059  Gross per 24 hour   Intake --   Output 300 ml   Net -300 ml     Body mass index is 33.11 kg/m².      06/14/21  0947   Weight: 82.1 kg (181 lb)     Weight change:     Vitals reviewed.   Constitutional:       Appearance: Normal and healthy appearance. Well-developed and not in distress.   Eyes:      Conjunctiva/sclera: Conjunctivae normal.   Neck:      Vascular: No carotid bruit. JVD normal.   Pulmonary:      Effort: Pulmonary effort is normal.      Breath sounds: Normal breath sounds.   Cardiovascular:      PMI at left midclavicular line. Normal rate. Regular rhythm.      Murmurs: There is no murmur.   Pulses:     Intact distal pulses.   Edema:     Peripheral edema absent.   Abdominal:      Palpations: Abdomen is soft.      Tenderness: There is no abdominal tenderness.   Skin:     General: Skin is warm and dry.   Neurological:      Mental Status: Alert and oriented to person, place and time.   Psychiatric:         Attention and Perception: Attention and perception normal.         Behavior: Behavior is cooperative.           Lab Review:           Results from last 7 days   Lab Units 06/14/21  1009   WBC 10*3/mm3 7.49   HEMOGLOBIN g/dL 13.5   HEMATOCRIT % 42.8   PLATELETS 10*3/mm3 267                   Invalid input(s): LDLCALC            Echo EF Estimated  No results found for: ECHOEFEST    EKG:   I personally viewed and interpreted the patient's EKG    Imaging:  Imaging Results (Most Recent)     None              Assessment:       Fracture, Colles, left, closed    1. Hypertension-continue home antihypertensive regimen.    2. Left wrist Colles' fracture-plan for open reduction internal fixation repair tomorrow.        Plan:     Continue home antihypertensive regimen.  Her revised cardiac risk criteria (melecio index) puts her at a very low risk for adverse cardiac outcomes.  She is cleared from a cardiac standpoint to proceed with ORIF fractured wrist tomorrow.     Thank you for allowing me to participate  in the care of Joan Block. Feel free to contact me directly with any further questions or concerns.    TRU Cloud  [facility]  06/14/21  13:29 EDT

## 2021-06-14 NOTE — H&P
Orthopedic Surgery    Patient Care Team:  Gwen Olson APRN as PCP - General (Nurse Practitioner)    CHIEF COMPLAINT: Left wrist pain    HISTORY OF PRESENT ILLNESS: 70-year-old female has been admitted this time to undergo prep evaluation in preparation for surgery to her left wrist.  She fell approximately 8 days ago while walking a dog injuring the left wrist.  Was seen in the ER x-ray shows a displaced intra-articular left Colles' fracture and she is to undergo a reduction total fixation tomorrow pending prep evaluation.          Past Medical History:   Diagnosis Date   • Hyperlipidemia    • Hypertension      Past Surgical History:   Procedure Laterality Date   • APPENDECTOMY     • FOOT SURGERY     • KNEE SURGERY       Family History   Problem Relation Age of Onset   • Cancer Mother    • Breast cancer Mother    • Diabetes Father      Social History     Tobacco Use   • Smoking status: Never Smoker   • Smokeless tobacco: Never Used   Vaping Use   • Vaping Use: Never used   Substance Use Topics   • Alcohol use: No   • Drug use: No     Medications Prior to Admission   Medication Sig Dispense Refill Last Dose   • acetaminophen (TYLENOL) 500 MG tablet Take 500 mg by mouth Every 3 (Three) Hours As Needed for Mild Pain .      • amLODIPine (NORVASC) 10 MG tablet Take 10 mg by mouth Daily.      • atorvastatin (LIPITOR) 10 MG tablet Take 10 mg by mouth Daily.      • HYDROcodone-acetaminophen (NORCO) 5-325 MG per tablet Take 1 tablet by mouth Every 4 (Four) Hours As Needed for Moderate Pain . 30 tablet 0    • lisinopril (PRINIVIL,ZESTRIL) 20 MG tablet       • traMADol (ULTRAM) 50 MG tablet Take 1 tablet by mouth Every 4 (Four) Hours As Needed for Moderate Pain . 30 tablet 0      No current facility-administered medications for this encounter.    Facility-Administered Medications Ordered in Other Encounters:   •  HYDROcodone-acetaminophen (NORCO) 7.5-325 MG per tablet 2 tablet, 2 tablet, Oral, Q4H PRN, Norman Brown  MD  •  ondansetron (ZOFRAN) tablet 4 mg, 4 mg, Oral, Q6H PRN, Norman Brown MD  •  sodium chloride 0.9 % flush 10 mL, 10 mL, Intravenous, PRN, Norman Brown MD  •  sodium chloride 0.9 % flush 10 mL, 10 mL, Intravenous, Q12H, Norman Brown MD  •  sodium chloride 0.9 % infusion 40 mL, 40 mL, Intravenous, PRN, Norman Brown MD    There is no immunization history on file for this patient.  Allergies:  Patient has no known allergies.    REVIEW OF SYSTEMS:  Please see the above history of present illness for pertinent positives and negatives.  The remainder of the patient's systems have been reviewed and are negative.    Vital Signs            Physical Exam:  Physical Exam   Constitutional: Patient appears well-developed and well-nourished and in no acute distress   HEENT:   Head: Normocephalic and atraumatic.   Eyes:  Pupils are equal, round, and reactive to light.  Mouth and Throat: Patient has moist mucous membranes. Oropharynx is clear of any erythema or exudate.     Neck: Neck supple. No JVD present. No thyromegaly present. No lymphadenopathy present.  Cardiovascular: Regular rate, regular rhythm.  Pulmonary/Chest: Lungs are clear to auscultation bilaterally.  Abdominal:benign,soft with bowel sounds  Musculoskeletal: Normal posture.  Extremities: Left upper extremity has good distal pulses with no motor or sensory deficit.  She has good capillary refill.  There is swelling and tenderness as expected.  Neurological: Patient is alert and oriented.  Psychological:   Mood and behavior appropriate.  Skin: Skin is warm and dry.  Debilities/Disabilities Identified: None  Results Review:    I reviewed the patient's new clinical results.  Lab Results (most recent)     None          Imaging Results (Most Recent)     None            ECG/EMG Results (most recent)     None            Assessment/Plan X-rays confirmed displaced left intra-articular distal Colles' fracture and ulnar styloid fracture        I discussed the  patients findings and my recommendations with patient and plan to proceed with open reduction internal fixation of her left distal Colles' fracture.  I discussed with the patient the risk of surgery which include but not limited to infection and the need for multiple stages to eradicate infection, nerve injury and paralysis or paresthesia, vascular injury, tendon injury, delayed union or nonunion, the need for further surgery in the future and post healing arthritis and stiffness of the wrist.  Understands the rehab is 3 to 6 months.  Also understands associated anesthesia risk.  Plan to proceed with surgery tomorrow pending evaluation by cardiology.  Patient understands agrees to proceed with the surgery.    Norman Brown MD  06/14/21  09:43 EDT

## 2021-06-14 NOTE — PLAN OF CARE
Goal Outcome Evaluation:  Plan of Care Reviewed With: patient        Progress: improving  Outcome Summary: vss, patient admitted for a wrist fracture.  surgery scheduled for tomorrow AM.  ambulates on own, alert and oriented. will be NPO at midnight

## 2021-06-15 ENCOUNTER — ANESTHESIA (OUTPATIENT)
Dept: PERIOP | Facility: HOSPITAL | Age: 71
End: 2021-06-15

## 2021-06-15 ENCOUNTER — APPOINTMENT (OUTPATIENT)
Dept: GENERAL RADIOLOGY | Facility: HOSPITAL | Age: 71
End: 2021-06-15

## 2021-06-15 VITALS
HEART RATE: 62 BPM | OXYGEN SATURATION: 95 % | HEIGHT: 62 IN | WEIGHT: 181 LBS | RESPIRATION RATE: 18 BRPM | DIASTOLIC BLOOD PRESSURE: 60 MMHG | TEMPERATURE: 98 F | BODY MASS INDEX: 33.31 KG/M2 | SYSTOLIC BLOOD PRESSURE: 122 MMHG

## 2021-06-15 PROCEDURE — C9290 INJ, BUPIVACAINE LIPOSOME: HCPCS | Performed by: ORTHOPAEDIC SURGERY

## 2021-06-15 PROCEDURE — 25010000002 DEXAMETHASONE PER 1 MG: Performed by: NURSE ANESTHETIST, CERTIFIED REGISTERED

## 2021-06-15 PROCEDURE — 76942 ECHO GUIDE FOR BIOPSY: CPT | Performed by: ORTHOPAEDIC SURGERY

## 2021-06-15 PROCEDURE — C1713 ANCHOR/SCREW BN/BN,TIS/BN: HCPCS | Performed by: ORTHOPAEDIC SURGERY

## 2021-06-15 PROCEDURE — C1889 IMPLANT/INSERT DEVICE, NOC: HCPCS | Performed by: ORTHOPAEDIC SURGERY

## 2021-06-15 PROCEDURE — 25010000002 KETOROLAC TROMETHAMINE PER 15 MG: Performed by: NURSE ANESTHETIST, CERTIFIED REGISTERED

## 2021-06-15 PROCEDURE — 25010000003 CEFAZOLIN SODIUM-DEXTROSE 2-3 GM-%(50ML) RECONSTITUTED SOLUTION: Performed by: ORTHOPAEDIC SURGERY

## 2021-06-15 PROCEDURE — G0378 HOSPITAL OBSERVATION PER HR: HCPCS

## 2021-06-15 PROCEDURE — 25010000002 ONDANSETRON PER 1 MG: Performed by: NURSE ANESTHETIST, CERTIFIED REGISTERED

## 2021-06-15 PROCEDURE — 73100 X-RAY EXAM OF WRIST: CPT

## 2021-06-15 PROCEDURE — 25010000003 BUPIVACAINE LIPOSOME 1.3 % SUSPENSION: Performed by: ORTHOPAEDIC SURGERY

## 2021-06-15 PROCEDURE — 25010000002 ROPIVACAINE PER 1 MG: Performed by: NURSE ANESTHETIST, CERTIFIED REGISTERED

## 2021-06-15 PROCEDURE — 94799 UNLISTED PULMONARY SVC/PX: CPT

## 2021-06-15 PROCEDURE — 25010000002 MIDAZOLAM PER 1MG: Performed by: NURSE ANESTHETIST, CERTIFIED REGISTERED

## 2021-06-15 PROCEDURE — 99024 POSTOP FOLLOW-UP VISIT: CPT | Performed by: NURSE PRACTITIONER

## 2021-06-15 PROCEDURE — 25010000002 FENTANYL CITRATE (PF) 50 MCG/ML SOLUTION: Performed by: NURSE ANESTHETIST, CERTIFIED REGISTERED

## 2021-06-15 PROCEDURE — 25608 OPTX DST RD XART FX/EPI SEP2: CPT | Performed by: ORTHOPAEDIC SURGERY

## 2021-06-15 PROCEDURE — 25010000002 PROPOFOL 10 MG/ML EMULSION: Performed by: NURSE ANESTHETIST, CERTIFIED REGISTERED

## 2021-06-15 DEVICE — KIRSCHNER WIRE: Type: IMPLANTABLE DEVICE | Site: WRIST | Status: FUNCTIONAL

## 2021-06-15 DEVICE — LOCKING PEG, T7
Type: IMPLANTABLE DEVICE | Site: WRIST | Status: FUNCTIONAL
Brand: VARIAX

## 2021-06-15 DEVICE — DEV CONTRL TISS STRATAFIX SPIRAL MNCRYL UD 3/0 PLS 45CM: Type: IMPLANTABLE DEVICE | Site: WRIST | Status: FUNCTIONAL

## 2021-06-15 DEVICE — BONE SCREW, T7
Type: IMPLANTABLE DEVICE | Site: WRIST | Status: FUNCTIONAL
Brand: VARIAX

## 2021-06-15 DEVICE — VOLAR DR PLATE INTERM. LEFT EXTRASHORT
Type: IMPLANTABLE DEVICE | Site: WRIST | Status: FUNCTIONAL
Brand: VARIAX

## 2021-06-15 DEVICE — WAX,BONE,NATURAL
Type: IMPLANTABLE DEVICE | Site: WRIST | Status: FUNCTIONAL
Brand: MEDLINE INDUSTRIES

## 2021-06-15 RX ORDER — DEXAMETHASONE SODIUM PHOSPHATE 4 MG/ML
8 INJECTION, SOLUTION INTRA-ARTICULAR; INTRALESIONAL; INTRAMUSCULAR; INTRAVENOUS; SOFT TISSUE ONCE AS NEEDED
Status: COMPLETED | OUTPATIENT
Start: 2021-06-15 | End: 2021-06-15

## 2021-06-15 RX ORDER — MAGNESIUM HYDROXIDE 1200 MG/15ML
LIQUID ORAL AS NEEDED
Status: DISCONTINUED | OUTPATIENT
Start: 2021-06-15 | End: 2021-06-15 | Stop reason: HOSPADM

## 2021-06-15 RX ORDER — LIDOCAINE HYDROCHLORIDE 20 MG/ML
INJECTION, SOLUTION INFILTRATION; PERINEURAL AS NEEDED
Status: DISCONTINUED | OUTPATIENT
Start: 2021-06-15 | End: 2021-06-15 | Stop reason: SURG

## 2021-06-15 RX ORDER — LISINOPRIL 10 MG/1
10 TABLET ORAL DAILY
Status: DISCONTINUED | OUTPATIENT
Start: 2021-06-15 | End: 2021-06-15 | Stop reason: HOSPADM

## 2021-06-15 RX ORDER — CEFAZOLIN SODIUM 2 G/50ML
2 SOLUTION INTRAVENOUS ONCE
Status: COMPLETED | OUTPATIENT
Start: 2021-06-15 | End: 2021-06-15

## 2021-06-15 RX ORDER — FENTANYL CITRATE 50 UG/ML
25 INJECTION, SOLUTION INTRAMUSCULAR; INTRAVENOUS
Status: DISCONTINUED | OUTPATIENT
Start: 2021-06-15 | End: 2021-06-15 | Stop reason: HOSPADM

## 2021-06-15 RX ORDER — TRAMADOL HYDROCHLORIDE 50 MG/1
50 TABLET ORAL EVERY 4 HOURS PRN
Status: DISCONTINUED | OUTPATIENT
Start: 2021-06-15 | End: 2021-06-15 | Stop reason: HOSPADM

## 2021-06-15 RX ORDER — MIDAZOLAM HYDROCHLORIDE 2 MG/2ML
0.5 INJECTION, SOLUTION INTRAMUSCULAR; INTRAVENOUS
Status: DISCONTINUED | OUTPATIENT
Start: 2021-06-15 | End: 2021-06-15 | Stop reason: HOSPADM

## 2021-06-15 RX ORDER — DEXMEDETOMIDINE HYDROCHLORIDE 100 UG/ML
INJECTION, SOLUTION INTRAVENOUS AS NEEDED
Status: DISCONTINUED | OUTPATIENT
Start: 2021-06-15 | End: 2021-06-15 | Stop reason: SURG

## 2021-06-15 RX ORDER — KETOROLAC TROMETHAMINE 30 MG/ML
INJECTION, SOLUTION INTRAMUSCULAR; INTRAVENOUS AS NEEDED
Status: DISCONTINUED | OUTPATIENT
Start: 2021-06-15 | End: 2021-06-15 | Stop reason: SURG

## 2021-06-15 RX ORDER — FENTANYL CITRATE 50 UG/ML
INJECTION, SOLUTION INTRAMUSCULAR; INTRAVENOUS AS NEEDED
Status: DISCONTINUED | OUTPATIENT
Start: 2021-06-15 | End: 2021-06-15 | Stop reason: SURG

## 2021-06-15 RX ORDER — KETAMINE HYDROCHLORIDE 10 MG/ML
INJECTION INTRAMUSCULAR; INTRAVENOUS AS NEEDED
Status: DISCONTINUED | OUTPATIENT
Start: 2021-06-15 | End: 2021-06-15 | Stop reason: SURG

## 2021-06-15 RX ORDER — AMLODIPINE BESYLATE 5 MG/1
10 TABLET ORAL DAILY
Status: DISCONTINUED | OUTPATIENT
Start: 2021-06-15 | End: 2021-06-15 | Stop reason: HOSPADM

## 2021-06-15 RX ORDER — ROPIVACAINE HYDROCHLORIDE 5 MG/ML
INJECTION, SOLUTION EPIDURAL; INFILTRATION; PERINEURAL
Status: COMPLETED | OUTPATIENT
Start: 2021-06-15 | End: 2021-06-15

## 2021-06-15 RX ORDER — FENTANYL CITRATE 50 UG/ML
50 INJECTION, SOLUTION INTRAMUSCULAR; INTRAVENOUS
Status: DISCONTINUED | OUTPATIENT
Start: 2021-06-15 | End: 2021-06-15 | Stop reason: HOSPADM

## 2021-06-15 RX ORDER — SODIUM CHLORIDE, SODIUM LACTATE, POTASSIUM CHLORIDE, CALCIUM CHLORIDE 600; 310; 30; 20 MG/100ML; MG/100ML; MG/100ML; MG/100ML
100 INJECTION, SOLUTION INTRAVENOUS CONTINUOUS
Status: DISCONTINUED | OUTPATIENT
Start: 2021-06-15 | End: 2021-06-15 | Stop reason: HOSPADM

## 2021-06-15 RX ORDER — ACETAMINOPHEN 500 MG
500 TABLET ORAL EVERY 4 HOURS PRN
Status: DISCONTINUED | OUTPATIENT
Start: 2021-06-15 | End: 2021-06-15 | Stop reason: HOSPADM

## 2021-06-15 RX ORDER — ONDANSETRON 2 MG/ML
4 INJECTION INTRAMUSCULAR; INTRAVENOUS ONCE AS NEEDED
Status: COMPLETED | OUTPATIENT
Start: 2021-06-15 | End: 2021-06-15

## 2021-06-15 RX ORDER — OXYCODONE HYDROCHLORIDE AND ACETAMINOPHEN 5; 325 MG/1; MG/1
1 TABLET ORAL ONCE AS NEEDED
Status: DISCONTINUED | OUTPATIENT
Start: 2021-06-15 | End: 2021-06-15 | Stop reason: HOSPADM

## 2021-06-15 RX ORDER — ATORVASTATIN CALCIUM 10 MG/1
10 TABLET, FILM COATED ORAL DAILY
Status: DISCONTINUED | OUTPATIENT
Start: 2021-06-15 | End: 2021-06-15 | Stop reason: HOSPADM

## 2021-06-15 RX ORDER — PROPOFOL 10 MG/ML
VIAL (ML) INTRAVENOUS AS NEEDED
Status: DISCONTINUED | OUTPATIENT
Start: 2021-06-15 | End: 2021-06-15 | Stop reason: SURG

## 2021-06-15 RX ORDER — ONDANSETRON 2 MG/ML
4 INJECTION INTRAMUSCULAR; INTRAVENOUS ONCE AS NEEDED
Status: DISCONTINUED | OUTPATIENT
Start: 2021-06-15 | End: 2021-06-15 | Stop reason: HOSPADM

## 2021-06-15 RX ORDER — SODIUM CHLORIDE, SODIUM LACTATE, POTASSIUM CHLORIDE, CALCIUM CHLORIDE 600; 310; 30; 20 MG/100ML; MG/100ML; MG/100ML; MG/100ML
9 INJECTION, SOLUTION INTRAVENOUS CONTINUOUS
Status: DISCONTINUED | OUTPATIENT
Start: 2021-06-15 | End: 2021-06-15 | Stop reason: HOSPADM

## 2021-06-15 RX ADMIN — LIDOCAINE HYDROCHLORIDE 40 MG: 20 INJECTION, SOLUTION INFILTRATION; PERINEURAL at 07:30

## 2021-06-15 RX ADMIN — KETOROLAC TROMETHAMINE 20 MG: 30 INJECTION, SOLUTION INTRAMUSCULAR; INTRAVENOUS at 08:19

## 2021-06-15 RX ADMIN — FENTANYL CITRATE 25 MCG: 50 INJECTION INTRAMUSCULAR; INTRAVENOUS at 07:46

## 2021-06-15 RX ADMIN — FENTANYL CITRATE 25 MCG: 50 INJECTION INTRAMUSCULAR; INTRAVENOUS at 07:58

## 2021-06-15 RX ADMIN — PROPOFOL 50 MCG/KG/MIN: 10 INJECTION, EMULSION INTRAVENOUS at 07:30

## 2021-06-15 RX ADMIN — DEXAMETHASONE SODIUM PHOSPHATE 8 MG: 4 INJECTION, SOLUTION INTRAMUSCULAR; INTRAVENOUS at 06:52

## 2021-06-15 RX ADMIN — SODIUM CHLORIDE, POTASSIUM CHLORIDE, SODIUM LACTATE AND CALCIUM CHLORIDE 9 ML/HR: 600; 310; 30; 20 INJECTION, SOLUTION INTRAVENOUS at 06:53

## 2021-06-15 RX ADMIN — PROPOFOL 40 MG: 10 INJECTION, EMULSION INTRAVENOUS at 07:30

## 2021-06-15 RX ADMIN — ONDANSETRON 4 MG: 2 INJECTION INTRAMUSCULAR; INTRAVENOUS at 06:53

## 2021-06-15 RX ADMIN — FENTANYL CITRATE 25 MCG: 50 INJECTION INTRAMUSCULAR; INTRAVENOUS at 08:16

## 2021-06-15 RX ADMIN — KETAMINE HYDROCHLORIDE 20 MG: 10 INJECTION, SOLUTION INTRAMUSCULAR; INTRAVENOUS at 07:36

## 2021-06-15 RX ADMIN — MIDAZOLAM HYDROCHLORIDE 0.5 MG: 1 INJECTION, SOLUTION INTRAMUSCULAR; INTRAVENOUS at 07:07

## 2021-06-15 RX ADMIN — FENTANYL CITRATE 25 MCG: 50 INJECTION INTRAMUSCULAR; INTRAVENOUS at 07:54

## 2021-06-15 RX ADMIN — DEXMEDETOMIDINE 50 MCG: 100 INJECTION, SOLUTION, CONCENTRATE INTRAVENOUS at 07:17

## 2021-06-15 RX ADMIN — ROPIVACAINE HYDROCHLORIDE 30 ML: 5 INJECTION, SOLUTION EPIDURAL; INFILTRATION; PERINEURAL at 07:17

## 2021-06-15 RX ADMIN — CEFAZOLIN SODIUM 2 G: 2 SOLUTION INTRAVENOUS at 07:56

## 2021-06-15 NOTE — CASE MANAGEMENT/SOCIAL WORK
Discharge Planning Assessment  TIFFANIE Lima     Patient Name: Joan Block  MRN: [id number]  Today's Date: 6/15/2021    Admit Date: 6/14/2021    Discharge Needs Assessment     Row Name 06/15/21 1321       Living Environment    Lives With  spouse    Name(s) of Who Lives With Patient  Cole Block,     Current Living Arrangements  home/apartment/condo two story house with 4 to 5 steps to gain entry    Duration at Residence  8 years    Potentially Unsafe Housing Conditions  -- none    Primary Care Provided by  self    Provides Primary Care For  no one    Caregiving Concerns  no care giving concerns voiced by patient at this time.    Family Caregiver if Needed  spouse;child(zaria), adult    Family Caregiver Names  Cole,  and Nain, son    Quality of Family Relationships  helpful;involved;supportive    Able to Return to Prior Arrangements  yes    Living Arrangement Comments  Pt states she lives with her  in a two story home with 4 to 5 steps to gain entry       Resource/Environmental Concerns    Resource/Environmental Concerns  none    Transportation Concerns  -- none       Transition Planning    Patient/Family Anticipates Transition to  home with family    Patient/Family Anticipated Services at Transition  none    Transportation Anticipated  family or friend will provide pt states her son will be able to provide ride home at discharge       Discharge Needs Assessment    Readmission Within the Last 30 Days  no previous admission in last 30 days    Current Outpatient/Agency/Support Group  -- none    Equipment Currently Used at Home  none    Concerns to be Addressed  no discharge needs identified;denies needs/concerns at this time    Concerns Comments  no discharge needs voiced at this time    Anticipated Changes Related to Illness  none    Equipment Needed After Discharge  none    Outpatient/Agency/Support Group Needs  -- pt states she does not feel like she will need these services at discharge     Discharge Facility/Level of Care Needs  -- pt states she does not feel like she will need these services at discharge    Provided Post Acute Provider List?  Refused    Refused Provider List Comment  Offered community resources but patient declines the need for them at this time.    Patient's Choice of Community Agency(S)  none    Discharge Coordination/Progress  Pt states that she plans on returning home at discharge with her family to help as needed, no discharge needs voiced by patient at this time.        Discharge Plan     Row Name 06/15/21 7986       Plan    Plan  Home with     Patient/Family in Agreement with Plan  yes    Plan Comments  Into room and introduced self and role of CM Discussed discharge disposition with patient and her  Cole with permission. Patient is currently sitting up in bed finishing lunch with no complaints. Patient confirms that the info on her face sheet is correct and that she see's JAZMIN Alexander RN as PCP. She states that he uses [facility] in [place] and has no problem picking up or paying for her medications. She also states that she does not have a living will and declines information regarding one. Patient states she lives with her  in a two story house with four to five steps to gain entry and normally states she has no problem maneuvering the steps or within the home.. She states that she is independent with her ADL's and drives, however, her son will be able to provide ride home at discharge. She also states that she currently does not use any DME and does not anticipate needing any equipment at discharge. Patient states she has not used home health in the past and does not think she will need this service at discharge. CM offered community resources but patient declines the need for them at this time. Patient states that she plans on returning home at discharge with her  and son to help as needed, no discharge needs voiced by patient  at this time. Patient had no other questions or concerns regarding discharge plans. CM will continue to follow for needs.        Continued Care and Services - Admitted Since 6/14/2021    Coordination has not been started for this encounter.       Expected Discharge Date and Time     Expected Discharge Date Expected Discharge Time    Guillaume 15, 2021         Demographic Summary     Row Name 06/15/21 1321       General Information    Admission Type  observation    Arrived From  home    Referral Source  admission list    Reason for Consult  discharge planning    Preferred Language  English     Used During This Interaction  no       Contact Information    Permission Granted to Share Info With          Functional Status    No documentation.       Psychosocial    No documentation.       Abuse/Neglect    No documentation.       Legal    No documentation.       Substance Abuse    No documentation.       Patient Forms    No documentation.           Lucía Jasso RN

## 2021-06-15 NOTE — ANESTHESIA POSTPROCEDURE EVALUATION
Patient: Joan Block    Procedure Summary     Date: 06/15/21 Room / Location:  [facility] OR 3 /  [facility] OR    Anesthesia Start: 0726 Anesthesia Stop: 0849    Procedure: ULNA/RADIUS OPEN REDUCTION INTERNAL FIXATION (Left Wrist) Diagnosis:       Closed fracture of distal end of left radius, unspecified fracture morphology, initial encounter      (Closed fracture of distal end of left radius, unspecified fracture morphology, initial encounter [S52.502A])    Surgeons: Norman Brown MD Provider: Jerrell Crocker CRNA    Anesthesia Type: regional ASA Status: 2          Anesthesia Type: regional    Vitals  Vitals Value Taken Time   /59 06/15/21 0925   Temp 98 °F (36.7 °C) 06/15/21 0850   Pulse 53 06/15/21 0927   Resp 16 06/15/21 0925   SpO2 98 % 06/15/21 0927   Vitals shown include unvalidated device data.        Post Anesthesia Care and Evaluation    Patient location during evaluation: bedside  Patient participation: complete - patient participated  Level of consciousness: awake and alert  Pain score: 0  Pain management: adequate  Airway patency: patent  Anesthetic complications: No anesthetic complications  PONV Status: none  Cardiovascular status: acceptable  Respiratory status: acceptable  Hydration status: acceptable

## 2021-06-15 NOTE — ANESTHESIA PREPROCEDURE EVALUATION
Anesthesia Evaluation     Patient summary reviewed and Nursing notes reviewed   no history of anesthetic complications:  NPO Solid Status: > 8 hours  NPO Liquid Status: > 6 hours           Airway   Mallampati: II  TM distance: >3 FB  Neck ROM: full  No difficulty expected  Dental          Pulmonary - negative pulmonary ROS and normal exam    breath sounds clear to auscultation  Cardiovascular - normal exam  Exercise tolerance: good (4-7 METS)    ECG reviewed  Rhythm: regular  Rate: normal    (+) hypertension well controlled 2 medications or greater, hyperlipidemia,     ROS comment: HEART RATE= 83  bpm  RR Interval= 720  ms  WI Interval= 139  ms  P Horizontal Axis= 8  deg  P Front Axis= 57  deg  QRSD Interval= 94  ms  QT Interval= 375  ms  QRS Axis= -44  deg  T Wave Axis= 48  deg  - ABNORMAL ECG -  Sinus rhythm  Left anterior fascicular block  No change from prior tracing  Electronically Signed By: Griselda Fierro ([facility]) 14-Jun-2021 11:01:35  Date and Time of Study: 2021-06-14 10:06:54    Neuro/Psych- negative ROS  GI/Hepatic/Renal/Endo    (+)  GERD,      ROS Comment: occasional    Musculoskeletal     Abdominal    Substance History - negative use     OB/GYN negative ob/gyn ROS         Other   arthritis,                      Anesthesia Plan    ASA 2     regional       Anesthetic plan, all risks, benefits, and alternatives have been provided, discussed and informed consent has been obtained with: patient.  Use of blood products discussed with patient  Consented to blood products.

## 2021-06-15 NOTE — PLAN OF CARE
Goal Outcome Evaluation:  Plan of Care Reviewed With: patient        Progress: no change  Outcome Summary: VSS - NPO at 0000 - self ambulates - consent signed - rested well through night

## 2021-06-15 NOTE — ANESTHESIA PROCEDURE NOTES
Peripheral Block      Patient reassessed immediately prior to procedure    Patient location during procedure: pre-op  Start time: 6/15/2021 7:11 AM  Stop time: 6/15/2021 7:17 AM  Reason for block: at surgeon's request and post-op pain management  Performed by  CRNA: Jerrell Crocker CRNA  Preanesthetic Checklist  Completed: patient identified, IV checked, site marked, risks and benefits discussed, surgical consent, monitors and equipment checked, pre-op evaluation and timeout performed  Prep:  Pt Position: supine  Sterile barriers:cap, gloves, mask and washed/disinfected hands  Prep: ChloraPrep  Patient monitoring: blood pressure monitoring, continuous pulse oximetry and EKG  Procedure  Sedation:yes  Performed under: local infiltration  Guidance:ultrasound guided  ULTRASOUND INTERPRETATION. Using ultrasound guidance a 21 G gauge needle was placed in close proximity to the nerve, at which point, under ultrasound guidance anesthetic was injected in the area of the nerve and spread of the anesthesia was seen on ultrasound in close proximity thereto.  There were no abnormalities seen on ultrasound; a digital image was taken; and the patient tolerated the procedure with no complications. Images:still images obtained, printed/placed on chart    Laterality:left  Block Type:axillary  Injection Technique:single-shot  Needle Type:echogenic  Needle Gauge:21 G  Resistance on Injection: none    Medications Used: ropivacaine (NAROPIN) injection 0.5 %, 30 mL  Med admintered at 6/15/2021 7:17 AM      Medications  Comment:50 mcg Precedex    Post Assessment  Injection Assessment: negative aspiration for heme, no paresthesia on injection and incremental injection  Patient Tolerance:comfortable throughout block  Complications:no

## 2021-06-15 NOTE — OP NOTE
Preoperative Diagnosis: Left distal intra-articular Colles' fracture    Postoperative Diagnosis: Same    Procedure Performed: Open reduction internal fixation of left distal intra-articular Colles' fracture with variax extra short intermediate distal radial plate using 2.0 locking pegs and 2.7 nonlocking cortical screws    Surgeon: Kevin      Assistant: Attila Gaspar.  Assisted with patient positioning,, draping, irrigation of wound with wound exposure, suturing and wound closure and postoperative splint application    Anesthesia: MAC with axillary block      Anesthetist: Jerrell Ivan    Tourniquet time 30 minutes      Estimated blood loss 15 cc    Drains: None    Complications: None        Indications for procedure: 70-year-old female with a displaced left intra-articular Colles' fracture          Operative Note: Patient brought to the operating room and after satisfactory anesthesia was obtained timeout and identifying patient procedure correctly.  A pneumonic tourniquet was placed on the left upper arm and the left arm was sterilely prepped and draped in a sterile field in the usual manner.  Timeout once again completed identifying the patient procedure correctly.  The arm was then exsanguinated tourniquet elevated to 250.  With the aid of the fluoroscope procedure is carried out.  A volar incision was made along the ulnar border of the FCR from the distal volar crease proximally for about 4 to 5 cm.  Via blunt sharp dissection the tendon sheath of the FCR was identified and then incised an open.  FCR was then retracted radially and the floor of the sheath was then opened allowing for ulnar retraction of the median nerve and neurovascular structures and the flexor tendon mass.  The FCR and the radial neurovascular bundles retracted radially exposing the pronator quadratus.  An ulnarly based flap was then carried out exposing both the proximal distal fragments of the fracture.  The fracture was manually reduced with  the elevators the fracture now reduced with restoration of the intra-articular component and reduction verified the fluoroscope in the left projection fixation today carried out using the variax intermediate place.  Fixation distally was carried out using 2.0 locking pegs approximately 2.7 cortical screws.  Intraoperative fluoroscope showed near anatomical reduction proper placement of all fixation devices.  Throughout the procedure from incision to closure the wound was constantly irrigated with irrisept.  The pronator quadratus was loosely reapproximated interrupted 2-0 Vicryl.  The tendon sheath of the FCR was closed in interrupted 2-0 Vicryl.  Tourniquet released with hemostasis electrocautery.  20 cc of Exparel was then injected into the superficial tissues.  Subcuticular closure was completed using 3-0 strata fix.  An Exofin dressing was then applied.  Sterile dressings then applied the patient then placed in a long-arm fiberglass splint with the elbow at 90 degrees and the wrist in the neutral position.  She is then awakened taken recovery having tolerated procedure well.  All counts were correct.            Dictated utilizing Dragon dictation

## 2021-06-15 NOTE — PLAN OF CARE
Goal Outcome Evaluation:  Plan of Care Reviewed With: patient        Progress: improving  Outcome Summary: Pt back to floor postoperative - VSS. No complaints of pain. Neurovascualar check intact. Pt eating/drinking.

## 2021-06-16 ENCOUNTER — READMISSION MANAGEMENT (OUTPATIENT)
Dept: CALL CENTER | Facility: HOSPITAL | Age: 71
End: 2021-06-16

## 2021-06-16 NOTE — OUTREACH NOTE
Prep Survey      Responses   [facility] patient discharged from?  [facility]   Is LACE score < 7 ?  Yes   Emergency Room discharge w/ pulse ox?  No   Eligibility  Readm Mgmt   Discharge diagnosis  reduction total fixation  Left wrist pain   Does the patient have one of the following disease processes/diagnoses(primary or secondary)?  Other   Does the patient have Home health ordered?  No   Is there a DME ordered?  No   Prep survey completed?  Yes          Rebekah Jones RN

## 2021-06-16 NOTE — DISCHARGE SUMMARY
Orthopedic Discharge Summary      Patient: Joan Block      YOB: 1950    Medical Record Number: [id number]    Attending Physician: No att. providers found  Consulting Physician(s):   Date of Admission: 6/14/2021  9:38 AM  Date of Discharge: 6/15/2021      Patient Active Problem List   Diagnosis   • Distal radial fracture   • Closed fracture of left distal radius   • Fracture, Colles, left, closed     Status Post: Open reduction internal fixation left distal intra-articular Colles' fracture    No Known Allergies    Current Medications:     Discharge Medications      Continue These Medications      Instructions Start Date   acetaminophen 500 MG tablet  Commonly known as: TYLENOL   500 mg, Oral, Every 4 Hours PRN      amLODIPine 10 MG tablet  Commonly known as: NORVASC   10 mg, Oral, Daily      atorvastatin 10 MG tablet  Commonly known as: LIPITOR   10 mg, Oral, Daily      lisinopril 20 MG tablet  Commonly known as: PRINIVIL,ZESTRIL   10 mg, Oral, Daily      traMADol 50 MG tablet  Commonly known as: ULTRAM   50 mg, Oral, Every 4 Hours PRN                 Past Medical History:   Diagnosis Date   • Hyperlipidemia    • Hypertension      Past Surgical History:   Procedure Laterality Date   • APPENDECTOMY     • FOOT SURGERY     • KNEE SURGERY       Social History     Occupational History   • Not on file   Tobacco Use   • Smoking status: Never Smoker   • Smokeless tobacco: Never Used   Vaping Use   • Vaping Use: Never used   Substance and Sexual Activity   • Alcohol use: No   • Drug use: No   • Sexual activity: Defer      Social History     Social History Narrative   • Not on file     Family History   Problem Relation Age of Onset   • Cancer Mother    • Breast cancer Mother    • Diabetes Father          Physical Exam: 70 y.o. female  General Appearance:    Alert, cooperative, in no acute distress                      Vitals:    06/15/21 1015 06/15/21 1030 06/15/21 1100 06/15/21 1338   BP: 102/57 115/55  106/57 122/60   BP Location: Right arm Right arm Right arm Right arm   Patient Position: Lying Lying Lying Lying   Pulse: 53 104 62 62   Resp: 18 16 18 18   Temp: 97.4 °F (36.3 °C)   98 °F (36.7 °C)   TempSrc: Oral   Oral   SpO2: 96% 96% 96% 95%   Weight:       Height:            Head:    Normocephalic, without obvious abnormality, atraumatic   Eyes:            Lids and lashes normal, conjunctivae and sclerae normal, no   icterus, no pallor, corneas clear, PERRLA   Ears:    Ears appear intact with no abnormalities noted   Throat:   No oral lesions, no thrush, oral mucosa moist   Neck:   No adenopathy, supple, trachea midline, no thyromegaly, no    carotid bruit, no JVD   Back:     No kyphosis present, no scoliosis present, no skin lesions,       erythema or scars, no tenderness to percussion or                   palpation,   range of motion normal   Lungs:     Clear to auscultation,respirations regular, even and                   unlabored    Heart:    Regular rhythm and normal rate, normal S1 and S2, no            murmur, no gallop, no rub, no click   Chest Wall:    No abnormalities observed   Abdomen:     Normal bowel sounds, no masses, no organomegaly, soft        non-tender, non-distended, no guarding, no rebound                 tenderness   Rectal:     Deferred   Extremities:   Incision intact without signs or symptoms of infection.               Neurovascular status remains intact to operative extremity.      Moves all extremities well, no edema, no cyanosis, no              redness   Pulses:   Pulses palpable and equal bilaterally   Skin:   No bleeding, bruising or rash   Lymph nodes:   No palpable adenopathy   Neurologic:   Cranial nerves 2 - 12 grossly intact, sensation intact, DTR        present and equal bilaterally           Hospital Course:  70 y.o. female admitted to [facility], [place], operated by [facility] to services of Norman Brown MD with Closed Colles' fracture of left radius, initial encounter [S52.532A]  Distal radial  fracture [S52.509A] on 6/14/2021 and underwent No admission procedures for hospital encounter.  Per Norman Brown MD. Antibiotic and VTE prophylaxis were per SCIP protocols. Post-operatively the patient transferred to the post-operative floor where the patient underwent mobilization therapy that included active as well as passive ROM exercises. Opioids were titrated to achieve appropriate pain management to allow for participation in mobilization exercises. Vital signs are now stable. The incision is intact without signs or symptoms of infection. Operative extremity neurovascular status remains intact.   Appropriate education re: incision care, activity levels, medications, and follow up visits was completed and all questions were answered. The patient is now deemed stable for discharge to Home.      DIAGNOSTIC TESTS:     Admission on 06/14/2021, Discharged on 06/15/2021   Component Date Value Ref Range Status   • WBC 06/14/2021 7.49  3.40 - 10.80 10*3/mm3 Final   • RBC 06/14/2021 4.84  3.77 - 5.28 10*6/mm3 Final   • Hemoglobin 06/14/2021 13.5  12.0 - 15.9 g/dL Final   • Hematocrit 06/14/2021 42.8  34.0 - 46.6 % Final   • MCV 06/14/2021 88.4  79.0 - 97.0 fL Final   • MCH 06/14/2021 27.9  26.6 - 33.0 pg Final   • MCHC 06/14/2021 31.5  31.5 - 35.7 g/dL Final   • RDW 06/14/2021 14.1  12.3 - 15.4 % Final   • RDW-SD 06/14/2021 46.3  37.0 - 54.0 fl Final   • MPV 06/14/2021 10.2  6.0 - 12.0 fL Final   • Platelets 06/14/2021 267  140 - 450 10*3/mm3 Final   • Neutrophil % 06/14/2021 67.0  42.7 - 76.0 % Final   • Lymphocyte % 06/14/2021 22.0  19.6 - 45.3 % Final   • Monocyte % 06/14/2021 7.2  5.0 - 12.0 % Final   • Eosinophil % 06/14/2021 2.4  0.3 - 6.2 % Final   • Basophil % 06/14/2021 0.9  0.0 - 1.5 % Final   • Immature Grans % 06/14/2021 0.5  0.0 - 0.5 % Final   • Neutrophils, Absolute 06/14/2021 5.01  1.70 - 7.00 10*3/mm3 Final   • Lymphocytes, Absolute 06/14/2021 1.65  0.70 - 3.10 10*3/mm3 Final   • Monocytes, Absolute  06/14/2021 0.54  0.10 - 0.90 10*3/mm3 Final   • Eosinophils, Absolute 06/14/2021 0.18  0.00 - 0.40 10*3/mm3 Final   • Basophils, Absolute 06/14/2021 0.07  0.00 - 0.20 10*3/mm3 Final   • Immature Grans, Absolute 06/14/2021 0.04  0.00 - 0.05 10*3/mm3 Final   • Color, UA 06/14/2021 Yellow  Yellow, Straw Final   • Appearance, UA 06/14/2021 Clear  Clear Final   • pH, UA 06/14/2021 <=5.0  4.5 - 8.0 Final   • Specific Gravity, UA 06/14/2021 1.015  1.003 - 1.030 Final   • Glucose, UA 06/14/2021 Negative  Negative Final   • Ketones, UA 06/14/2021 Negative  Negative Final   • Bilirubin, UA 06/14/2021 Negative  Negative Final   • Blood, UA 06/14/2021 Negative  Negative Final   • Protein, UA 06/14/2021 Negative  Negative Final   • Leuk Esterase, UA 06/14/2021 Negative  Negative Final   • Nitrite, UA 06/14/2021 Negative  Negative Final   • Urobilinogen, UA 06/14/2021 0.2 E.U./dL  0.2 - 1.0 E.U./dL Final   • COVID19 06/14/2021 Not Detected  Not Detected - Ref. Range Final       No results found.    Discharge and Follow up Instructions:   Splint remain intact until follow-up in office  Finger range of motion  Follow-up in Ortho office in 1 week      Date: 6/16/2021    [facility] [place]

## 2021-06-16 NOTE — CASE MANAGEMENT/SOCIAL WORK
Case Management Discharge Note      Final Note: Discharged home.    Provided Post Acute Provider List?: Refused  Refused Provider List Comment: Offered community resources but patient declines the need for them at this time.    Selected Continued Care - Discharged on 6/15/2021 Admission date: 6/14/2021 - Discharge disposition: Home or Self Care    Destination    No services have been selected for the patient.              Durable Medical Equipment    No services have been selected for the patient.              Dialysis/Infusion    No services have been selected for the patient.              Home Medical Care    No services have been selected for the patient.              Therapy    No services have been selected for the patient.              Community Resources    No services have been selected for the patient.              Community & DME    No services have been selected for the patient.                       Final Discharge Disposition Code: 01 - home or self-care

## 2021-06-17 ENCOUNTER — READMISSION MANAGEMENT (OUTPATIENT)
Dept: CALL CENTER | Facility: HOSPITAL | Age: 71
End: 2021-06-17

## 2021-06-17 DIAGNOSIS — S52.532A CLOSED COLLES' FRACTURE OF LEFT RADIUS, INITIAL ENCOUNTER: ICD-10-CM

## 2021-06-17 RX ORDER — TRAMADOL HYDROCHLORIDE 50 MG/1
50 TABLET ORAL EVERY 4 HOURS PRN
Qty: 30 TABLET | Refills: 0 | Status: SHIPPED | OUTPATIENT
Start: 2021-06-17 | End: 2021-08-25

## 2021-06-17 NOTE — OUTREACH NOTE
LAG < 7 Survey      Responses   [facility] patient discharged from?  [facility]   Does the patient have one of the following disease processes/diagnoses(primary or secondary)?  Other   [facility] <7 Attempt successful?  Yes   Call start time  1718   Call end time  1720   Discharge diagnosis  reduction total fixation  Left wrist pain   Is patient permission given to speak with other caregiver?  Yes   Person spoke with today (if not patient) and relationship  Cole/ spouse   Meds reviewed with patient/caregiver?  Yes   Is the patient having any side effects they believe may be caused by any medication additions or changes?  No   Does the patient have all medications ordered at discharge?  Yes   Is the patient taking all medications as directed (includes completed medication regime)?  Yes   Does the patient have a primary care provider?   Yes   Comments regarding PCP  Has a followup with Ortho on 6/25/2021   Has the patient kept scheduled appointments due by today?  N/A   Has home health visited the patient within 72 hours of discharge?  N/A   Psychosocial issues?  No   Did the patient receive a copy of their discharge instructions?  Yes   Nursing interventions  Reviewed instructions with patient   What is the patient's perception of their health status since discharge?  Improving   Is the patient/caregiver able to teach back signs and symptoms related to disease process for when to call PCP?  Yes   Is the patient/caregiver able to teach back signs and symptoms related to disease process for when to call 911?  Yes   Is the patient/caregiver able to teach back the hierarchy of who to call/visit for symptoms/problems? PCP, Specialist, Home health nurse, Urgent Care, ED, 911  Yes   If the patient is a current smoker, are they able to teach back resources for cessation?  Not a smoker   Additional teach back comments  Patient having pain related to fracture but pain medication is relieving.   Graduated  Yes   Is the patient  interested in additional calls from an ambulatory ?  NOTE:  applies to high risk patients requiring additional follow-up.  No          Jose Gupta RN

## 2021-06-25 ENCOUNTER — OFFICE VISIT (OUTPATIENT)
Dept: ORTHOPEDIC SURGERY | Facility: CLINIC | Age: 71
End: 2021-06-25

## 2021-06-25 VITALS — BODY MASS INDEX: 33.31 KG/M2 | HEIGHT: 62 IN | WEIGHT: 181 LBS

## 2021-06-25 DIAGNOSIS — S52.532A CLOSED COLLES' FRACTURE OF LEFT RADIUS, INITIAL ENCOUNTER: Primary | ICD-10-CM

## 2021-06-25 DIAGNOSIS — Z09 STATUS POST ORTHOPEDIC SURGERY, FOLLOW-UP EXAM: ICD-10-CM

## 2021-06-25 PROCEDURE — 99024 POSTOP FOLLOW-UP VISIT: CPT | Performed by: ORTHOPAEDIC SURGERY

## 2021-06-25 PROCEDURE — 29105 APPLICATION LONG ARM SPLINT: CPT | Performed by: ORTHOPAEDIC SURGERY

## 2021-06-25 NOTE — PROGRESS NOTES
Answers for HPI/ROS submitted by the patient on 6/25/2021  Please describe your symptoms.: Follow-up post op, wrist surgery  Have you had these symptoms before?: No  How long have you been having these symptoms?: 1-2 weeks  Please list any medications you are currently taking for this condition.: Ultram, Tylenol, motion, lisinopril,  amlodipine, atorstatin  Please describe any probable cause for these symptoms. : Pain and high blood pressure, cholesterol  What is the primary reason for your visit?: Other    Subjective: Status post ORIF left distal radial fracture     Patient ID: Joan Block is a 70 y.o. female.    Chief Complaint:    History of Present Illness patient is 2 weeks out doing well.  Pain well controlled oral medication.       Social History     Occupational History   • Not on file   Tobacco Use   • Smoking status: Never Smoker   • Smokeless tobacco: Never Used   Vaping Use   • Vaping Use: Never used   Substance and Sexual Activity   • Alcohol use: No   • Drug use: No   • Sexual activity: Defer      Review of Systems   Constitutional: Negative for chills, diaphoresis, fever and unexpected weight change.   HENT: Negative for hearing loss, nosebleeds, sore throat and tinnitus.    Eyes: Negative for pain and visual disturbance.   Respiratory: Negative for cough, shortness of breath and wheezing.    Cardiovascular: Negative for chest pain and palpitations.   Gastrointestinal: Negative for abdominal pain, diarrhea, nausea and vomiting.   Endocrine: Negative for cold intolerance, heat intolerance and polydipsia.   Genitourinary: Negative for difficulty urinating, dysuria and hematuria.   Musculoskeletal: Positive for arthralgias and myalgias. Negative for joint swelling.   Skin: Negative for rash and wound.   Allergic/Immunologic: Negative for environmental allergies.   Neurological: Negative for dizziness, syncope and numbness.   Hematological: Does not bruise/bleed easily.   Psychiatric/Behavioral:  Negative for dysphoric mood and sleep disturbance. The patient is not nervous/anxious.          Past Medical History:   Diagnosis Date   • Hyperlipidemia    • Hypertension      Past Surgical History:   Procedure Laterality Date   • APPENDECTOMY     • FOOT SURGERY     • KNEE SURGERY     • ORIF ULNA/RADIUS FRACTURES Left 6/15/2021    Procedure: ULNA/RADIUS OPEN REDUCTION INTERNAL FIXATION;  Surgeon: Norman Brown MD;  Location: [facility];  Service: Orthopedics;  Laterality: Left;  ULNA/RADIAL OPEN REDUCTION INTERNAL FIXATION     Family History   Problem Relation Age of Onset   • Cancer Mother    • Breast cancer Mother    • Diabetes Father          Objective:  There were no vitals filed for this visit.      06/25/21  0939   Weight: 82.1 kg (181 lb)     Body mass index is 33.11 kg/m².        Ortho Exam   Wounds completely benign.  All splint removed.  No motor or sensory deficit.  No drainage from the wound.  Minimal erythema.    Assessment:        1. Closed Colles' fracture of left radius, initial encounter    2. Status post orthopedic surgery, follow-up exam           Plan: New long-arm splint applied with the elbow 90 degrees of wrist in a neutral position.  Return in 2 weeks with x-rays out of the splint.  Answered all questions            Work Status:    GIANA query complete.    Orders:  No orders of the defined types were placed in this encounter.      Medications:  No orders of the defined types were placed in this encounter.      Followup:  Return in about 2 weeks (around 7/9/2021).          Dictated utilizing Dragon dictation

## 2021-06-29 ENCOUNTER — TELEPHONE (OUTPATIENT)
Dept: ORTHOPEDIC SURGERY | Facility: CLINIC | Age: 71
End: 2021-06-29

## 2021-06-29 DIAGNOSIS — S52.532A CLOSED COLLES' FRACTURE OF LEFT RADIUS, INITIAL ENCOUNTER: Primary | ICD-10-CM

## 2021-06-29 RX ORDER — ONDANSETRON 4 MG/1
4 TABLET, FILM COATED ORAL EVERY 8 HOURS PRN
Qty: 20 TABLET | Refills: 1 | Status: CANCELLED | OUTPATIENT
Start: 2021-06-29

## 2021-06-29 RX ORDER — ONDANSETRON 4 MG/1
4 TABLET, FILM COATED ORAL EVERY 8 HOURS PRN
Qty: 15 TABLET | Refills: 0 | Status: SHIPPED | OUTPATIENT
Start: 2021-06-29 | End: 2021-08-25

## 2021-06-29 RX ORDER — ONDANSETRON 4 MG/1
4 TABLET, FILM COATED ORAL EVERY 6 HOURS PRN
Status: DISCONTINUED | OUTPATIENT
Start: 2021-06-29 | End: 2021-06-29

## 2021-06-29 NOTE — TELEPHONE ENCOUNTER
Called and spoke with patient she states that the pain medication is just making her so nauseous- it does help the pain however she avoids it due to nausea and the nausea increases when she is in pain.

## 2021-06-29 NOTE — TELEPHONE ENCOUNTER
Caller: VESNA LEON   Relationship to Patient: SELF     Phone Number: [phone]  Reason for Call: PATIENT CALLING ASKING IF SHE CAN GET HER PAIN MEDICATION SWITCHED BECAUSE ITS NOT HELPING WITH THE PAIN AND ITS MAKING HER FEEL NAUSEOUS

## 2021-07-08 ENCOUNTER — OFFICE VISIT (OUTPATIENT)
Dept: ORTHOPEDIC SURGERY | Facility: CLINIC | Age: 71
End: 2021-07-08

## 2021-07-08 VITALS — HEIGHT: 62 IN | WEIGHT: 181 LBS | BODY MASS INDEX: 33.31 KG/M2

## 2021-07-08 DIAGNOSIS — S52.532A CLOSED COLLES' FRACTURE OF LEFT RADIUS, INITIAL ENCOUNTER: Primary | ICD-10-CM

## 2021-07-08 DIAGNOSIS — Z09 STATUS POST ORTHOPEDIC SURGERY, FOLLOW-UP EXAM: ICD-10-CM

## 2021-07-08 PROCEDURE — 99024 POSTOP FOLLOW-UP VISIT: CPT | Performed by: ORTHOPAEDIC SURGERY

## 2021-07-08 PROCEDURE — 73110 X-RAY EXAM OF WRIST: CPT | Performed by: ORTHOPAEDIC SURGERY

## 2021-07-08 RX ORDER — PREGABALIN 75 MG/1
75 CAPSULE ORAL 2 TIMES DAILY
Qty: 60 CAPSULE | Refills: 0 | Status: SHIPPED | OUTPATIENT
Start: 2021-07-08 | End: 2021-08-25 | Stop reason: SDUPTHER

## 2021-07-08 NOTE — PROGRESS NOTES
Subjective: Status post ORIF left distal radius     Patient ID: Joan Block is a 70 y.o. female.    Chief Complaint:    History of Present Illness patient is 4 weeks out is doing fairly well.  Having some constant achiness but the pain is controlled with Tylenol.  Slowly getting better but still having again the constant achiness in the swelling of the fingers.       Social History     Occupational History   • Not on file   Tobacco Use   • Smoking status: Former Smoker     Packs/day: 0.50     Years: 15.00     Pack years: 7.50     Types: Cigarettes     Start date: 1970     Quit date: 10/1/1985     Years since quittin.7   • Smokeless tobacco: Never Used   Vaping Use   • Vaping Use: Never used   Substance and Sexual Activity   • Alcohol use: No   • Drug use: No   • Sexual activity: Not Currently     Partners: Male     Birth control/protection: Post-menopausal      Review of Systems      Past Medical History:   Diagnosis Date   • Arthritis of back 8 yrs ago   • Fracture of wrist 2021   • Fracture, radius 2021   • Fracture, ulna 2021   • Hyperlipidemia    • Hypertension    • Tendinitis of knee      Past Surgical History:   Procedure Laterality Date   • APPENDECTOMY     • FOOT SURGERY     • KNEE SURGERY     • ORIF ULNA/RADIUS FRACTURES Left 6/15/2021    Procedure: ULNA/RADIUS OPEN REDUCTION INTERNAL FIXATION;  Surgeon: Norman Brown MD;  Location: [facility];  Service: Orthopedics;  Laterality: Left;  ULNA/RADIAL OPEN REDUCTION INTERNAL FIXATION   • WRIST SURGERY       Family History   Problem Relation Age of Onset   • Cancer Mother    • Breast cancer Mother    • Diabetes Father          Objective:  There were no vitals filed for this visit.      21  0851   Weight: 82.1 kg (181 lb)     Body mass index is 33.1 kg/m².        Ortho Exam   AP lateral oblique of the left wrist does show anatomical reduction.  Callus formation is not seen yet.  She has no motor or sensory deficit  she does have moderate swelling to the fingers and to the hand.  The wounds completely benign.  The Dermabond dressing was removed.  She is good capillary refill skin is cool to touch    Assessment:        1. Closed Colles' fracture of left radius, initial encounter    2. Status post orthopedic surgery, follow-up exam           Plan: Placed in a new long-arm splint.  Operative instructed her on range of motion to the hand of the digits and also go to start her on Lyrica for the pain.  Return to see me in 3 weeks for exam and x-ray out of the splint answered all questions            Work Status:    GIANA query complete.    Orders:  Orders Placed This Encounter   Procedures   • XR Wrist 3+ View Left       Medications:  New Medications Ordered This Visit   Medications   • pregabalin (Lyrica) 75 MG capsule     Sig: Take 1 capsule by mouth 2 (Two) Times a Day.     Dispense:  60 capsule     Refill:  0       Followup:  Return in about 3 weeks (around 7/29/2021).          Dictated utilizing Dragon dictation   Answers for HPI/ROS submitted by the patient on 7/6/2021  Please describe your symptoms.: Follow up from wrist surgery  Have you had these symptoms before?: No  How long have you been having these symptoms?: Greater than 2 weeks  Please list any medications you are currently taking for this condition.: Equate. Tramodol, high blood pressure  meds, atorvastatin  Please describe any probable cause for these symptoms. : Cholesterol,  blood pressure, wrist surgery  What is the primary reason for your visit?: Other

## 2021-07-15 ENCOUNTER — TRANSCRIBE ORDERS (OUTPATIENT)
Dept: ADMINISTRATIVE | Facility: HOSPITAL | Age: 71
End: 2021-07-15

## 2021-07-15 DIAGNOSIS — Z12.31 VISIT FOR SCREENING MAMMOGRAM: Primary | ICD-10-CM

## 2021-07-16 ENCOUNTER — TELEPHONE (OUTPATIENT)
Dept: ORTHOPEDIC SURGERY | Facility: CLINIC | Age: 71
End: 2021-07-16

## 2021-07-16 NOTE — TELEPHONE ENCOUNTER
----- Message from Joan Block sent at 7/16/2021 10:44 AM EDT -----  Regarding: Non-Urgent Medical Question  Contact: [phone]  Unable to grasp ball and straighten fingers as instructed due to finger swelling.  Pain  continues to be light most of the time, occasionally Tylenol helps to keep pain at a minimum.  Any suggestions?

## 2021-07-16 NOTE — TELEPHONE ENCOUNTER
Called and spoke with patient-encouraged her the really elevate her arm above the level of her heart (she had not been elevating or icing it) if she is resting in her recliner place a pillow in her lap and hold her operative arm up and let it rest upon her opposite shoulder, when in bed let it rest on 2 or more pillows, start icing 20 minutes at time. She sates that she is able to place a finger tip under the splint and has good blood flow return when she put pressure on her finger nails and she has all feeling.     Patient to contact the office Monday if the above steps show no improvement or if she has any new or worsening issues she can call the the office line [phone] and there is someone on call 24/7.    S/p ORIF Closed Colles' fracture of left radius-06.15.2021.

## 2021-07-26 ENCOUNTER — OFFICE VISIT (OUTPATIENT)
Dept: ORTHOPEDIC SURGERY | Facility: CLINIC | Age: 71
End: 2021-07-26

## 2021-07-26 VITALS — HEIGHT: 62 IN | BODY MASS INDEX: 33.31 KG/M2 | WEIGHT: 181 LBS

## 2021-07-26 DIAGNOSIS — Z09 STATUS POST ORTHOPEDIC SURGERY, FOLLOW-UP EXAM: ICD-10-CM

## 2021-07-26 DIAGNOSIS — S52.532A CLOSED COLLES' FRACTURE OF LEFT RADIUS, INITIAL ENCOUNTER: Primary | ICD-10-CM

## 2021-07-26 PROCEDURE — 73110 X-RAY EXAM OF WRIST: CPT | Performed by: ORTHOPAEDIC SURGERY

## 2021-07-26 PROCEDURE — 99024 POSTOP FOLLOW-UP VISIT: CPT | Performed by: ORTHOPAEDIC SURGERY

## 2021-07-26 NOTE — PROGRESS NOTES
Subjective: Status post ORIF left distal radial fracture     Patient ID: Joan Block is a 71 y.o. female.    Chief Complaint:    History of Present Illness patient is 6 weeks out is doing well.  Still experiencing some pain but is slowly improving.  Biggest problem main swelling of the metacarpophalangeal joint and the digits unable to make a full fist.       Social History     Occupational History   • Not on file   Tobacco Use   • Smoking status: Former Smoker     Packs/day: 0.50     Years: 15.00     Pack years: 7.50     Types: Cigarettes     Start date: 1970     Quit date: 10/1/1985     Years since quittin.8   • Smokeless tobacco: Never Used   Vaping Use   • Vaping Use: Never used   Substance and Sexual Activity   • Alcohol use: No   • Drug use: No   • Sexual activity: Not Currently     Partners: Male     Birth control/protection: Post-menopausal      Review of Systems   Constitutional: Negative for chills, diaphoresis, fever and unexpected weight change.   HENT: Negative for hearing loss, nosebleeds, sore throat and tinnitus.    Eyes: Negative for pain and visual disturbance.   Respiratory: Negative for cough, shortness of breath and wheezing.    Cardiovascular: Negative for chest pain and palpitations.   Gastrointestinal: Negative for abdominal pain, diarrhea, nausea and vomiting.   Endocrine: Negative for cold intolerance, heat intolerance and polydipsia.   Genitourinary: Negative for difficulty urinating, dysuria and hematuria.   Musculoskeletal: Positive for arthralgias and myalgias. Negative for joint swelling.   Skin: Negative for rash and wound.   Allergic/Immunologic: Negative for environmental allergies.   Neurological: Negative for dizziness, syncope and numbness.   Hematological: Does not bruise/bleed easily.   Psychiatric/Behavioral: Negative for dysphoric mood and sleep disturbance. The patient is not nervous/anxious.          Past Medical History:   Diagnosis Date   • Arthritis of back  8 yrs ago   • Fracture of wrist June 7 2021   • Fracture, radius June 7 2021   • Fracture, ulna June 7 2021   • Hyperlipidemia    • Hypertension    • Tendinitis of knee 2019     Past Surgical History:   Procedure Laterality Date   • APPENDECTOMY     • FOOT SURGERY     • KNEE SURGERY     • ORIF ULNA/RADIUS FRACTURES Left 6/15/2021    Procedure: ULNA/RADIUS OPEN REDUCTION INTERNAL FIXATION;  Surgeon: Norman Brown MD;  Location: [facility];  Service: Orthopedics;  Laterality: Left;  ULNA/RADIAL OPEN REDUCTION INTERNAL FIXATION   • WRIST SURGERY  2021     Family History   Problem Relation Age of Onset   • Cancer Mother    • Breast cancer Mother    • Diabetes Father          Objective:  There were no vitals filed for this visit.      07/26/21  0940   Weight: 82.1 kg (181 lb)     Body mass index is 33.11 kg/m².        Ortho Exam   AP lateral oblique view of the wrist out of the cast shows near anatomical alignment.  No change compared to previous x-rays.  She does complain of some mild carpal tunnel symptoms involving the long and ring finger but she has good capillary refill.  Has limited range of motion of the digits with does have some fusiform swelling.  No evidence of RSD at this time.  She can flex only about 10 to 15 degrees.  Her wounds completely benign.  Elbow is stiff because she has been in a long-arm splint.    Assessment:        1. Closed Colles' fracture of left radius, initial encounter    2. Status post orthopedic surgery, follow-up exam           Plan: I am going convert her to a short arm splint.  Instructed to work on range of motion of the digits even using the other hand to assist.  Continue the Lyrica.  Pain medicine as needed.  Return in 2 weeks repeat x-ray we will switch her over to her wrist immobilizer begin physical therapy.  I think the problem she is going to have is regaining motion to the digits.  The carpal tunnel symptoms hopefully should resolve.  Answered all questions            Work  Status:    GIANA query complete.    Orders:  Orders Placed This Encounter   Procedures   • XR Wrist 3+ View Left       Medications:  No orders of the defined types were placed in this encounter.      Followup:  Return in about 2 weeks (around 8/9/2021).          Dictated utilizing Dragon dictation   Answers for HPI/ROS submitted by the patient on 7/23/2021  Please describe your symptoms.: Follow up visit from wrist surgery  Have you had these symptoms before?: No  How long have you been having these symptoms?: Greater than 2 weeks  Please list any medications you are currently taking for this condition.: Pregabalin 75 mg  Please describe any probable cause for these symptoms. : Nerve endings disrupted from surgery  What is the primary reason for your visit?: Other

## 2021-08-04 ENCOUNTER — OFFICE VISIT (OUTPATIENT)
Dept: ORTHOPEDIC SURGERY | Facility: CLINIC | Age: 71
End: 2021-08-04

## 2021-08-04 VITALS — HEIGHT: 62 IN | BODY MASS INDEX: 33.31 KG/M2 | WEIGHT: 181 LBS

## 2021-08-04 DIAGNOSIS — Z09 STATUS POST ORTHOPEDIC SURGERY, FOLLOW-UP EXAM: ICD-10-CM

## 2021-08-04 DIAGNOSIS — S52.532A CLOSED COLLES' FRACTURE OF LEFT RADIUS, INITIAL ENCOUNTER: Primary | ICD-10-CM

## 2021-08-04 PROCEDURE — 73110 X-RAY EXAM OF WRIST: CPT | Performed by: ORTHOPAEDIC SURGERY

## 2021-08-04 PROCEDURE — 99024 POSTOP FOLLOW-UP VISIT: CPT | Performed by: ORTHOPAEDIC SURGERY

## 2021-08-04 NOTE — PROGRESS NOTES
Subjective: Status post ORIF left distal radial fracture     Patient ID: Joan Block is a 71 y.o. female.    Chief Complaint:    History of Present Illness patient is 2 months out.  Seen a little bit early today because she is concerned about persistent stiffness in her fingers.  Her pain is slowly improving but the stiffness in the fingers biggest concern.       Social History     Occupational History   • Not on file   Tobacco Use   • Smoking status: Former Smoker     Packs/day: 0.50     Years: 15.00     Pack years: 7.50     Types: Cigarettes     Start date: 1970     Quit date: 10/1/1985     Years since quittin.8   • Smokeless tobacco: Never Used   Vaping Use   • Vaping Use: Never used   Substance and Sexual Activity   • Alcohol use: No   • Drug use: No   • Sexual activity: Not Currently     Partners: Male     Birth control/protection: Post-menopausal      Review of Systems   Constitutional: Negative for chills, diaphoresis, fever and unexpected weight change.   HENT: Negative for hearing loss, nosebleeds, sore throat and tinnitus.    Eyes: Negative for pain and visual disturbance.   Respiratory: Negative for cough, shortness of breath and wheezing.    Cardiovascular: Negative for chest pain and palpitations.   Gastrointestinal: Negative for abdominal pain, diarrhea, nausea and vomiting.   Endocrine: Negative for cold intolerance, heat intolerance and polydipsia.   Genitourinary: Negative for difficulty urinating, dysuria and hematuria.   Musculoskeletal: Positive for arthralgias and myalgias. Negative for joint swelling.   Skin: Negative for rash and wound.   Allergic/Immunologic: Negative for environmental allergies.   Neurological: Negative for dizziness, syncope and numbness.   Hematological: Does not bruise/bleed easily.   Psychiatric/Behavioral: Negative for dysphoric mood and sleep disturbance. The patient is not nervous/anxious.          Past Medical History:   Diagnosis Date   • Arthritis of  back 8 yrs ago   • Fracture of wrist June 7 2021   • Fracture, radius June 7 2021   • Fracture, ulna June 7 2021   • Hyperlipidemia    • Hypertension    • Tendinitis of knee 2019     Past Surgical History:   Procedure Laterality Date   • APPENDECTOMY     • FOOT SURGERY     • KNEE SURGERY     • ORIF ULNA/RADIUS FRACTURES Left 6/15/2021    Procedure: ULNA/RADIUS OPEN REDUCTION INTERNAL FIXATION;  Surgeon: Norman Brown MD;  Location: [facility];  Service: Orthopedics;  Laterality: Left;  ULNA/RADIAL OPEN REDUCTION INTERNAL FIXATION   • WRIST SURGERY  2021     Family History   Problem Relation Age of Onset   • Cancer Mother    • Breast cancer Mother    • Diabetes Father          Objective:  There were no vitals filed for this visit.      08/04/21  1312   Weight: 82.1 kg (181 lb)     Body mass index is 33.11 kg/m².        Ortho Exam   AP lateral oblique view show near anatomic alignment of the fracture.  Some callus formation is seen compared to previous x-rays and alignment is near anatomical.  Exam out of the splint shows a well-healed wound.  Her fingers and digits are very stiff is probably no more than 15 to 20 degrees of motion at the metacarpophalangeal joints unless the map in the IP joints.  Still having carpal tunnel symptoms but she has good capillary refill.  Her wounds completely benign.    Assessment:        1. Closed Colles' fracture of left radius, initial encounter    2. Status post orthopedic surgery, follow-up exam           Plan: Placed in a wrist immobilizer get involved in occupational therapy for range of motion exercises.  Return to see me in 3 weeks with repeat x-ray.  Answered all questions            Work Status:    GIANA query complete.    Orders:  Orders Placed This Encounter   Procedures   • XR Wrist 3+ View Left   • Ambulatory Referral to Occupational Therapy       Medications:  No orders of the defined types were placed in this encounter.      Followup:  Return in about 3 weeks (around  8/25/2021).          Dictated utilizing Dragon dictation   Answers for HPI/ROS submitted by the patient on 8/2/2021  Please describe your symptoms.: Followup from wrist surgery  Have you had these symptoms before?: No  How long have you been having these symptoms?: Greater than 2 weeks  Please list any medications you are currently taking for this condition.: Tylenol and motrin  Please describe any probable cause for these symptoms. : Unsure  What is the primary reason for your visit?: Other       range of motion is not limited and there is no muscle tenderness.

## 2021-08-06 ENCOUNTER — HOSPITAL ENCOUNTER (OUTPATIENT)
Dept: OCCUPATIONAL THERAPY | Facility: HOSPITAL | Age: 71
Setting detail: THERAPIES SERIES
Discharge: HOME | End: 2021-08-06

## 2021-08-06 DIAGNOSIS — S52.532D CLOSED COLLES' FRACTURE OF LEFT RADIUS WITH ROUTINE HEALING, SUBSEQUENT ENCOUNTER: Primary | ICD-10-CM

## 2021-08-06 PROCEDURE — 97165 OT EVAL LOW COMPLEX 30 MIN: CPT

## 2021-08-06 NOTE — THERAPY EVALUATION
Outpatient Occupational Therapy Ortho Initial Evaluation   Norah Tejeda     Patient Name: Joan Block  : 1950  MRN: [id number]  Today's Date: 2021      Visit Date: 2021    Patient Active Problem List   Diagnosis   • Distal radial fracture   • Closed fracture of left distal radius   • Fracture, Colles, left, closed        Past Medical History:   Diagnosis Date   • Arthritis of back 8 yrs ago   • Fracture of wrist 2021   • Fracture, radius 2021   • Fracture, ulna 2021   • Hyperlipidemia    • Hypertension    • Tendinitis of knee         Past Surgical History:   Procedure Laterality Date   • APPENDECTOMY     • FOOT SURGERY     • KNEE SURGERY     • ORIF ULNA/RADIUS FRACTURES Left 6/15/2021    Procedure: ULNA/RADIUS OPEN REDUCTION INTERNAL FIXATION;  Surgeon: Norman Brown MD;  Location: [facility];  Service: Orthopedics;  Laterality: Left;  ULNA/RADIAL OPEN REDUCTION INTERNAL FIXATION   • WRIST SURGERY           Visit Dx:    ICD-10-CM ICD-9-CM   1. Closed Colles' fracture of left radius with routine healing, subsequent encounter  S52.532D V54.12       Patient History     Row Name 21 1000             History    Chief Complaint  Difficulty with daily activities;Pain;Swelling;Tightness;Muscle weakness  -EN      Type of Pain  Hand pain;Wrist pain  -EN      Date Current Problem(s) Began  21  -EN      Brief Description of Current Complaint  Patient suffered a fall on 21 resulting in a distal radius fracture. Patient now s/p ORIF (6/15/21). Patient reports pain much better since long arm cast removed 2 days ago. Patient with decreased finger, wrist, forearrm and elbow AROM. Reports pain up to 8/10 with certain activities however is 2/10 at rest.  -EN      Patient/Caregiver Goals  Relieve pain;Return to prior level of function;Return to work;Improve strength;Decrease swelling  -EN      Patient/Caregiver Goals Comment  improve ROM/strength, decrease  pain/inflammation  -EN      Current Tobacco Use  no  -EN      Smoking Status  --  -EN      Patient's Rating of General Health  Good  -EN      Hand Dominance  left-handed  -EN      Occupation/sports/leisure activities  works at [employer].  "[employer].  -EN      How has patient tried to help current problem?  Reports she has been trying to move fingers/wrist and has been running them under warm water  -EN      What clinical tests have you had for this problem?  X-ray  -EN      Surgery/Hospitalization  6/15/21- ORIF  -EN         Pain     Pain Location  Hand;Elbow;Wrist  -EN      Pain at Present  2  -EN      Pain at Best  2  -EN      Pain at Worst  8  -EN      Pain Frequency  Constant/continuous  -EN      Pain Description  Aching  -EN      What Performance Factors Make the Current Problem(s) WORSE?  movement  -EN      What Performance Factors Make the Current Problem(s) BETTER?  warm water, cock up splint  -EN      Is your sleep disturbed?  No  -EN      Difficulties at work?  has not been to work since injury  -EN      Difficulties with ADL's?  difficulty fastening bra, avoiding buttons, zippers, tying shoes  -EN         Fall Risk Assessment    Any falls in the past year:  Yes  -EN      Number of falls reported in the last 12 months  one  -EN         Daily Activities    Primary Language  English  -EN      Are you able to read  Yes  -EN      Are you able to write  Yes  -EN      How does patient learn best?  Listening  -EN      Teaching needs identified  Home Exercise Program  -EN      Patient is concerned about/has problems with  Difficulty with self care (i.e. bathing, dressing, toileting:;Grasping objects lifting;Performing home management (household chores, shopping, care of dependents);Performing job responsibilities/community activities (work, school,;Writing/grasping items with hand(s)  -EN      Barriers to learning  None  -EN      Explanation of Functional Status Problem  patient has been using her right  hand for all ADL/IADLs  -EN      Pt Participated in POC and Goals  Yes  -EN         Safety    Are you being hurt, hit, or frightened by anyone at home or in your life?  No  -EN      Are you being neglected by a caregiver  No  -EN        User Key  (r) = Recorded By, (t) = Taken By, (c) = Cosigned By    Initials Name Provider Type    Bailee Estrada OTABRAHAM Occupational Therapist          OT Ortho     Row Name 08/06/21 1100             Subjective Comments    Subjective Comments  Patient reported improved movement and decreased pain following participation in HEP/edema management techniques  -EN         Subjective Pain    Able to rate subjective pain?  yes  -EN      Pre-Treatment Pain Level  2  -EN      Post-Treatment Pain Level  -- reported less pain, didn't rate  -EN         Posture/Observations    Posture/Observations Comments  Patient wearing wrist cock up splint (left). Patient with significant edema in hand/wrist/elbow. Incision healing, scabs present. Patient holding elbow in approximately 100 degrees flexion with forearm in full supination. Patient with very little L hand ROM.  -EN         Sensation    Sensation WNL?  WFL  -EN      Light Touch  No apparent deficits  -EN         Body Part    Body Part  Elbow/Forearm;Wrist/Hand  -EN         General ROM    RT Upper Ext  Rt Shoulder Flexion;Rt Elbow Extension/Flexion;Rt Elbow Supination;Rt Elbow Pronation;Rt Wrist Flexion;Rt Wrist Extension  -EN      LT Upper Ext  Lt Shoulder Flexion;Lt Elbow Supination;Lt Elbow Extension/Flexion;Lt Elbow Pronation;Lt Wrist Flexion;Lt Wrist Extension;Lt Ulnar Deviation;Lt Radial Deviation  -EN         Right Upper Ext    Rt Shoulder Flexion AROM  wfl  -EN      Rt Elbow Extension/Flexion AROM  0-136  -EN      Rt Elbow Supination AROM  88  -EN      Rt Elbow Pronation AROM  88  -EN      Rt Wrist Flexion AROM  76  -EN      Rt Wrist Extension AROM  70  -EN         Left Upper Ext    Lt Shoulder Flexion AROM  wfl  -EN      Lt Elbow  Extension/Flexion AROM  -  -EN      Lt Elbow Supination AROM  85  -EN      Lt Elbow Pronation AROM  -3  -EN      Lt Wrist Flexion AROM  6  -EN      Lt Wrist Extension AROM  0  -EN      Lt  Ulnar Deviation AROM  2  -EN      Lt  Radial Deviation AROM  0  -EN        User Key  (r) = Recorded By, (t) = Taken By, (c) = Cosigned By    Initials Name Provider Type    EN Bailee Casillas OTR Occupational Therapist             Hand Therapy (last 24 hours)      Hand Eval     Row Name 08/06/21 1100             Splint Form    Splint Type  -- wearing a left wrist cock-up splint  -EN         Hand ROM Tested?    Hand ROM Tested?  Left Extension- AROM;Left Flexion- AROM;Left Thumb- AROM  -EN R hand AROM WFL        Left Extension AROM    II- MP AROM  -35  -EN      II- PIP AROM  -20  -EN      II- DIP AROM  -18  -EN      II- BAPTISTE Left Extension AROM  -73  -EN      III- MP AROM  -30  -EN      III- PIP AROM  -45  -EN      III- DIP AROM  -35  -EN      III- BAPTISTE Left Extension AROM  -110  -EN      IV- MP AROM  -38  -EN      IV- PIP AROM  -44  -EN      IV- DIP AROM  -20  -EN      IV- BAPTISTE Left Extension AROM  -102  -EN      V- MP AROM  -25  -EN      V- PIP AROM  -34  -EN      V- DIP AROM  -30  -EN      V- BAPTISTE Left Extension AROM  -89  -EN         Left Flexion AROM    II- MP AROM  49  -EN      II- PIP AROM  24  -EN      II- DIP AROM  24  -EN      II- BAPTISTE Left Flexion AROM  97  -EN      III- MP AROM  48  -EN      III- PIP AROM  63  -EN      III- DIP AROM  49  -EN      III- BAPTISTE Left Flexion AROM  160  -EN      IV- MP AROM  49  -EN      IV- PIP AROM  65  -EN      IV- DIP AROM  35  -EN      IV- BAPTISTE Left Flexion AROM  149  -EN      V- MP AROM  40  -EN      V- PIP AROM  60  -EN      V- DIP AROM  35  -EN      V- BAPTISTE Left Flexion AROM  135  -EN         Left Thumb AROM    MP Flexion - AROM  35  -EN      MP Extension- AROM  -21  -EN      IP Flexion - AROM  12  -EN         Therapy Education    Education Details  Edema management, issued size d  tubigrip for left wrist and size f for left elbow. Instructed on skin check and to wear during daytime only. Patient also instructed on ROM exercises.  -EN      Given  HEP;Symptoms/condition management;Edema management  -EN      Program  New  -EN      How Provided  Verbal;Demonstration;Written  -EN      Provided to  Patient  -EN      Level of Understanding  Teach back education performed;Verbalized;Demonstrated  -EN        User Key  (r) = Recorded By, (t) = Taken By, (c) = Cosigned By    Initials Name Provider Type    Bailee Estrada OTABRAHAM Occupational Therapist              Therapy Education  Education Details: Edema management, issued size d tubigrip for left wrist and size f for left elbow. Instructed on skin check and to wear during daytime only. Patient also instructed on ROM exercises.  Given: HEP, Symptoms/condition management, Edema management  Program: New  How Provided: Verbal, Demonstration, Written  Provided to: Patient  Level of Understanding: Teach back education performed, Verbalized, Demonstrated    OT Goals     Row Name 08/06/21 1100          OT Short Term Goals    STG Date to Achieve  08/20/21  -EN     STG 1  Patient will demonstrate independence with HEP including ROM, edema management and scar care.  -EN     STG 1 Progress  New  -EN     STG 2  Patient will improve BAPTISTE in left digits by 25 degrees for improved grasp.  -EN     STG 2 Progress  New  -EN     STG 3  Patient will improve active L wrist flexion and extension by 15 degress.  -EN     STG 3 Progress  New  -EN     STG 4  Patient will improve L forearm active pronation by 20 degrees.  -EN     STG 4 Progress  New  -EN     STG 5  Patient will report 50% less pain during activity.  -EN     STG 5 Progress  New  -EN     Additional STG's  STG 6  -EN     STG 6  Patient will improve left elbow AROM to WFL.  -EN     STG 6 Progress  New  -EN        Long Term Goals    LTG Date to Achieve  09/17/21  -EN     LTG 1  Patient will demonstrate WFL left  hand/wrist/forearm/elbow  AROM.  -EN     LTG 1 Progress  New  -EN     LTG 2  Patient will demonstrate WFL strength in L UE.  -EN     LTG 2 Progress  New  -EN     LTG 3  Patient will report pain 1/10 or less during activity.  -EN     LTG 3 Progress  New  -EN     LTG 4  Patient will report independence with all ADL/IADLs.  -EN     LTG 4 Progress  New  -EN       User Key  (r) = Recorded By, (t) = Taken By, (c) = Cosigned By    Initials Name Provider Type    Bailee Estrada OTR Occupational Therapist          OT Assessment/Plan     Row Name 08/06/21 3391          OT Assessment    Functional Limitations  Performance in leisure activities;Performance in self-care ADL;Performance in work activities;Limitation in home management  -EN     Impairments  Edema;Muscle strength;Pain;Range of motion  -EN     Assessment Comments  Patient presents to OT following a left distal radius fracture. Patient with decreased hand, wrist, forearm and elbow AROM, significant edema, and pain 2/10 at rest, 8/10 with activity. Patient will benefit from OT to address these deficits.  -EN     OT Diagnosis  Decreased L UE function, decreased ADL/IADL independence, pain in L UE  -EN     OT Rehab Potential  Good  -EN     Patient/caregiver participated in establishment of treatment plan and goals  Yes  -EN     Patient would benefit from skilled therapy intervention  Yes  -EN        OT Plan    OT Frequency  3x/week  -EN     Predicted Duration of Therapy Intervention (OT)  6 weeks  -EN     Planned CPT's?  OT EVAL LOW COMPLEXITY: 68301;OT THER ACT EA 15 MIN: [id number];OT HOT/COLD PACK  -EN     Planned Therapy Interventions (Optional Details)  home exercise program;patient/family education;ROM (Range of Motion);strengthening;other (see comments) edema management, scar care  -EN     OT Plan Comments  Continue with plan of care.  -EN       User Key  (r) = Recorded By, (t) = Taken By, (c) = Cosigned By    Initials Name Provider Type    MULUGETA Casillas  KENNETH Mccarty Occupational Therapist           OT Exercises     Row Name 08/06/21 1100             Exercise 1    Exercise Name 1  Review of HEP including AROM, edema management  -EN        User Key  (r) = Recorded By, (t) = Taken By, (c) = Cosigned By    Initials Name Provider Type    Bailee Estrada OTR Occupational Therapist            Outcome Measure Options: Quick DASH  Quick DASH  Open a tight or new jar.: Unable  Do heavy household chores (e.g., wash walls, wash floors): Unable  Carry a shopping bag or briefcase: Mild Difficulty  Wash your back: Moderate Difficulty  Use a knife to cut food: Severe Difficulty  Recreational activities in which you take some force or impact through your arm, should or hand (e.g. golf, hammering, tennis, etc.): Unable  During the past week, to what extent has your arm, shoulder, or hand problem interfered with your normal social activites with family, friends, neighbors or groups?: Extremely  During the past week, were you limited in your work or other regular daily activities as a result of your arm, shoulder or hand problem?: Unable  Arm, Shoulder, or hand pain: Severe  Tingling (pins and needles) in your arm, shoulder, or hand: Moderate  During the past week, how much difficulty have you had sleeping because of the pain in your arm, shoulder or hand?: Moderate Difficiculty  Number of Questions Answered: 11  Quick DASH Score: 75         Time Calculation:    OT Start Time: 1050  OT Stop Time: 1152  OT Time Calculation (min): 62 min     Therapy Charges for Today     Code Description Service Date Service Provider Modifiers Qty    18351300182  OT EVAL LOW COMPLEXITY 4 8/6/2021 Bailee Casillas OTR GO 1                  KENNETH Chavez  8/6/2021

## 2021-08-09 ENCOUNTER — HOSPITAL ENCOUNTER (OUTPATIENT)
Dept: OCCUPATIONAL THERAPY | Facility: HOSPITAL | Age: 71
Setting detail: THERAPIES SERIES
Discharge: HOME | End: 2021-08-09

## 2021-08-09 DIAGNOSIS — S52.532D CLOSED COLLES' FRACTURE OF LEFT RADIUS WITH ROUTINE HEALING, SUBSEQUENT ENCOUNTER: Primary | ICD-10-CM

## 2021-08-09 PROCEDURE — 97530 THERAPEUTIC ACTIVITIES: CPT

## 2021-08-09 NOTE — THERAPY TREATMENT NOTE
Outpatient Occupational Therapy Ortho Treatment Note   [facility]     Patient Name: Joan Block  : 1950  MRN: [id number]  Today's Date: 2021        Visit Date: 2021    Patient Active Problem List   Diagnosis   • Distal radial fracture   • Closed fracture of left distal radius   • Fracture, Colles, left, closed        Past Medical History:   Diagnosis Date   • Arthritis of back 8 yrs ago   • Fracture of wrist 2021   • Fracture, radius 2021   • Fracture, ulna 2021   • Hyperlipidemia    • Hypertension    • Tendinitis of knee         Past Surgical History:   Procedure Laterality Date   • APPENDECTOMY     • FOOT SURGERY     • KNEE SURGERY     • ORIF ULNA/RADIUS FRACTURES Left 6/15/2021    Procedure: ULNA/RADIUS OPEN REDUCTION INTERNAL FIXATION;  Surgeon: Norman Brown MD;  Location: [facility];  Service: Orthopedics;  Laterality: Left;  ULNA/RADIAL OPEN REDUCTION INTERNAL FIXATION   • WRIST SURGERY           Visit Dx:    ICD-10-CM ICD-9-CM   1. Closed Colles' fracture of left radius with routine healing, subsequent encounter  S52.532D V54.12       OT Ortho     Row Name 21 1100 21 1000          Subjective Pain    Able to rate subjective pain?  --  yes  -EN     Pre-Treatment Pain Level  --  0  -EN     Post-Treatment Pain Level  --  -- reported a little more pain, didn't rate  -EN        Posture/Observations    Posture/Observations Comments  --  Patient with a slight decrease in edema, scabs on incision. Patient holding elbow in flexion with forearm in full supination.  -EN        Left Upper Ext    Lt Elbow Extension/Flexion AROM  -  -EN  --     Lt Elbow Pronation AROM  8  -EN  --     Lt Wrist Flexion AROM  13  -EN  --     Lt Wrist Extension AROM  19  -EN  --     Lt  Ulnar Deviation AROM  13  -EN  --     Lt  Radial Deviation AROM  2  -EN  --        RUE Edema - Circumference (cm)    Elbow Crease  25.5 cm  -EN  --     Wrist Crease  15.3 cm  -EN  --         ANDREE Edema - Circumference (cm)    Elbow Crease  28.5 cm  -EN  --     Wrist Crease  17 cm  -EN  --       User Key  (r) = Recorded By, (t) = Taken By, (c) = Cosigned By    Initials Name Provider Type    Bailee Estrada OTR Occupational Therapist                       OT Assessment/Plan     Row Name 08/09/21 1227          OT Assessment    Functional Limitations  Performance in leisure activities;Performance in self-care ADL;Performance in work activities;Limitation in home management  -EN     Impairments  Edema;Muscle strength;Pain;Range of motion  -EN     Assessment Comments  Patient doing well with HEP.  Improvement in hand/wrist/forearm and elbow AROM. Reinforced HEP and encouraged patient to  small items using a pincer grasp to encourage increased digit 1 and 2 ROM.  -EN        OT Plan    OT Plan Comments  Continue with goals.  -EN       User Key  (r) = Recorded By, (t) = Taken By, (c) = Cosigned By    Initials Name Provider Type    Bailee Estrada OTABRAHAM Occupational Therapist           OT Goals     Row Name 08/09/21 1100          OT Short Term Goals    STG Date to Achieve  08/20/21  -EN     STG 1  Patient will demonstrate independence with HEP including ROM, edema management and scar care.  -EN     STG 1 Progress  Met  -EN     STG 2  Patient will improve BAPTISTE in left digits by 25 degrees for improved grasp.  -EN     STG 2 Progress  Ongoing;Progressing  -EN     STG 3  Patient will improve active L wrist flexion and extension by 15 degress.  -EN     STG 3 Progress  Ongoing;Progressing  -EN     STG 4  Patient will improve L forearm active pronation by 20 degrees.  -EN     STG 4 Progress  Ongoing;Progressing  -EN     STG 5  Patient will report 50% less pain during activity.  -EN     STG 5 Progress  Ongoing;Progressing  -EN     STG 6  Patient will improve left elbow AROM to WFL.  -EN     STG 6 Progress  Ongoing;Progressing  -EN        Long Term Goals    LTG Date to Achieve  09/17/21  -EN     LTG 1   Patient will demonstrate WFL left hand/wrist/forearm/elbow  AROM.  -EN     LTG 1 Progress  Ongoing  -EN     LTG 2  Patient will demonstrate WFL strength in L UE.  -EN     LTG 2 Progress  Ongoing  -EN     LTG 3  Patient will report pain 1/10 or less during activity.  -EN     LTG 3 Progress  Ongoing  -EN     LTG 4  Patient will report independence with all ADL/IADLs.  -EN     LTG 4 Progress  Ongoing  -EN       User Key  (r) = Recorded By, (t) = Taken By, (c) = Cosigned By    Initials Name Provider Type    EN Bailee Casillas OTR Occupational Therapist          Modalities     Row Name 08/09/21 1055             Moist Heat    MH Applied  Yes  -EN      Location  left wrist/hand  -EN      OT Moist Heat Minutes  15  -EN      MH Prior to Rx  Yes  -EN        User Key  (r) = Recorded By, (t) = Taken By, (c) = Cosigned By    Initials Name Provider Type    Bailee Estrada OTR Occupational Therapist        OT Exercises     Row Name 08/09/21 1000             Exercise 1    Exercise Name 1  retrograde edema massage  -EN         Exercise 2    Exercise Name 2  ROM exercises, elbow, forearm, wrist and hand  -EN         Exercise 3    Exercise Name 3  fine motor coordination activities  -EN        User Key  (r) = Recorded By, (t) = Taken By, (c) = Cosigned By    Initials Name Provider Type    Bailee Estrada OTR Occupational Therapist                        Time Calculation:   OT Start Time: 1049  OT Stop Time: 1205  OT Time Calculation (min): 76 min  Timed Charges  92151 - OT Therapeutic Activity Minutes: 61  Untimed Charges  OT Moist Heat Minutes: 15  Total Minutes  Timed Charges Total Minutes: 61  Untimed Charges Total Minutes: 15   Total Minutes: 61     Therapy Charges for Today     Code Description Service Date Service Provider Modifiers Qty    25765181166 HC OT THERAPEUTIC ACT EA 15 MIN 8/9/2021 Bailee Casillas OTR GO 4    90199063168 HC OT HOT OR COLD PACK TREAT [facility] 8/9/2021 Bailee Casillas  OTR GO 1                    Bailee Casillas, OTR  8/9/2021

## 2021-08-11 ENCOUNTER — HOSPITAL ENCOUNTER (OUTPATIENT)
Dept: OCCUPATIONAL THERAPY | Facility: HOSPITAL | Age: 71
Setting detail: THERAPIES SERIES
Discharge: HOME | End: 2021-08-11

## 2021-08-11 DIAGNOSIS — S52.532D CLOSED COLLES' FRACTURE OF LEFT RADIUS WITH ROUTINE HEALING, SUBSEQUENT ENCOUNTER: Primary | ICD-10-CM

## 2021-08-11 PROCEDURE — 97530 THERAPEUTIC ACTIVITIES: CPT

## 2021-08-11 NOTE — THERAPY TREATMENT NOTE
"Outpatient Occupational Therapy Ortho Treatment Note   Norah Tejeda     Patient Name: Joan Block  : 1950  MRN: [id number]  Today's Date: 2021        Visit Date: 2021    Patient Active Problem List   Diagnosis   • Distal radial fracture   • Closed fracture of left distal radius   • Fracture, Colles, left, closed        Past Medical History:   Diagnosis Date   • Arthritis of back 8 yrs ago   • Fracture of wrist 2021   • Fracture, radius 2021   • Fracture, ulna 2021   • Hyperlipidemia    • Hypertension    • Tendinitis of knee         Past Surgical History:   Procedure Laterality Date   • APPENDECTOMY     • FOOT SURGERY     • KNEE SURGERY     • ORIF ULNA/RADIUS FRACTURES Left 6/15/2021    Procedure: ULNA/RADIUS OPEN REDUCTION INTERNAL FIXATION;  Surgeon: Norman Brown MD;  Location: [facility];  Service: Orthopedics;  Laterality: Left;  ULNA/RADIAL OPEN REDUCTION INTERNAL FIXATION   • WRIST SURGERY           Visit Dx:    ICD-10-CM ICD-9-CM   1. Closed Colles' fracture of left radius with routine healing, subsequent encounter  S52.532D V54.12       OT Ortho     Row Name 21 1100             Subjective Pain    Able to rate subjective pain?  yes  -EN      Pre-Treatment Pain Level  -- 1-2  -EN      Post-Treatment Pain Level  -- \"Feels better!\" Did not rate  -EN         Posture/Observations    Posture/Observations Comments  Patient continues to hold forearm in full supination with elbow flexed. Encouraged neutral position/pronation and to sit with elbow extended with a towel under the elbow to allow a stretch from the weight of the hand. Patient continues to have scabs at incision and edema in hand/wrist/forearm/ elbow.  -EN         Left Upper Ext    Lt Elbow Pronation AROM  15  -EN      Lt Wrist Flexion AROM  20  -EN      Lt Wrist Extension AROM  20  -EN        User Key  (r) = Recorded By, (t) = Taken By, (c) = Cosigned By    Initials Name Provider Type    EN Sonja" KENNETH Mccarty Occupational Therapist                  Therapy Education  Given: HEP, Symptoms/condition management, Edema management  Program: Reinforced  How Provided: Verbal, Demonstration  Provided to: Patient  Level of Understanding: Teach back education performed, Verbalized, Demonstrated    OT Assessment/Plan     Row Name 08/11/21 1305          OT Assessment    Functional Limitations  Performance in leisure activities;Performance in self-care ADL;Performance in work activities;Limitation in home management  -EN     Impairments  Edema;Muscle strength;Pain;Range of motion  -EN     Assessment Comments  Patient continues to demonstrate improved AROM with wrist/forearm and hand. Patient continues to hold forearm in full supination with elbow flexed, encouraged to attempt to hold in neutral/pronation and when sitting at the table to place a towel under the elbow to allow the weight of the hand to stretch for improved elbow extension (currently -55 degrees extension). Edema has also decreased in the L UE, reinforced edema management techniques/massage.  -EN        OT Plan    OT Plan Comments  Continue with goals.  -EN       User Key  (r) = Recorded By, (t) = Taken By, (c) = Cosigned By    Initials Name Provider Type    Bailee Estrada OTR Occupational Therapist           OT Goals     Row Name 08/11/21 1100          OT Short Term Goals    STG Date to Achieve  08/20/21  -EN     STG 1  Patient will demonstrate independence with HEP including ROM, edema management and scar care.  -EN     STG 1 Progress  Met  -EN     STG 2  Patient will improve BAPTISTE in left digits by 25 degrees for improved grasp.  -EN     STG 2 Progress  Ongoing;Progressing  -EN     STG 3  Patient will improve active L wrist flexion and extension by 15 degress.  -EN     STG 3 Progress  Ongoing;Progressing  -EN     STG 4  Patient will improve L forearm active pronation by 20 degrees.  -EN     STG 4 Progress  Ongoing;Progressing  -EN     STG 5   Patient will report 50% less pain during activity.  -EN     STG 5 Progress  Ongoing;Progressing  -EN     STG 6  Patient will improve left elbow AROM to WFL.  -EN     STG 6 Progress  Ongoing;Progressing  -EN        Long Term Goals    LTG Date to Achieve  09/17/21  -EN     LTG 1  Patient will demonstrate WFL left hand/wrist/forearm/elbow  AROM.  -EN     LTG 1 Progress  Ongoing  -EN     LTG 2  Patient will demonstrate WFL strength in L UE.  -EN     LTG 2 Progress  Ongoing  -EN     LTG 3  Patient will report pain 1/10 or less during activity.  -EN     LTG 3 Progress  Ongoing  -EN     LTG 4  Patient will report independence with all ADL/IADLs.  -EN     LTG 4 Progress  Ongoing  -EN       User Key  (r) = Recorded By, (t) = Taken By, (c) = Cosigned By    Initials Name Provider Type    Bailee Estrada OTABRAHAM Occupational Therapist          Modalities     Row Name 08/11/21 1100             Moist Heat    MH Applied  Yes  -EN      Location  left wrist/hand  -EN      OT Moist Heat Minutes  15  -EN      MH Prior to Rx  Yes  -EN        User Key  (r) = Recorded By, (t) = Taken By, (c) = Cosigned By    Initials Name Provider Type    Bailee Estrada OTR Occupational Therapist        OT Exercises     Row Name 08/11/21 1100             Exercise 1    Exercise Name 1  retrograde edema massage- left elbow/forearm/wrist/hand  -EN         Exercise 2    Exercise Name 2  ROM exercises, elbow, forearm, wrist and hand  -EN         Exercise 3    Exercise Name 3  fine motor coordination activities  -EN        User Key  (r) = Recorded By, (t) = Taken By, (c) = Cosigned By    Initials Name Provider Type    Bailee Estrada OTR Occupational Therapist                        Time Calculation:   OT Start Time: 1054  OT Stop Time: 1202  OT Time Calculation (min): 68 min  Timed Charges  87194 - OT Therapeutic Activity Minutes: 53  Untimed Charges  OT Moist Heat Minutes: 15  Total Minutes  Timed Charges Total Minutes: 53  Untimed  Charges Total Minutes: 15   Total Minutes: 53     Therapy Charges for Today     Code Description Service Date Service Provider Modifiers Qty    44199721775  OT THERAPEUTIC ACT EA 15 MIN 8/11/2021 Bailee Casillas OTR GO 4    [id number]  OT HOT OR COLD PACK TREAT MCARE 8/11/2021 Bailee Casillas OTR GO 1                    KENNETH Chavez  8/11/2021

## 2021-08-13 ENCOUNTER — HOSPITAL ENCOUNTER (OUTPATIENT)
Dept: OCCUPATIONAL THERAPY | Facility: HOSPITAL | Age: 71
Setting detail: THERAPIES SERIES
Discharge: HOME | End: 2021-08-13

## 2021-08-13 DIAGNOSIS — S52.532D CLOSED COLLES' FRACTURE OF LEFT RADIUS WITH ROUTINE HEALING, SUBSEQUENT ENCOUNTER: Primary | ICD-10-CM

## 2021-08-13 PROCEDURE — 97530 THERAPEUTIC ACTIVITIES: CPT

## 2021-08-13 NOTE — THERAPY TREATMENT NOTE
Outpatient Occupational Therapy Ortho Treatment Note  [facility] [place]     Patient Name: Joan Block  : 1950  MRN: [id number]  Today's Date: 2021        Visit Date: 2021    Patient Active Problem List   Diagnosis   • Distal radial fracture   • Closed fracture of left distal radius   • Fracture, Colles, left, closed        Past Medical History:   Diagnosis Date   • Arthritis of back 8 yrs ago   • Fracture of wrist 2021   • Fracture, radius 2021   • Fracture, ulna 2021   • Hyperlipidemia    • Hypertension    • Tendinitis of knee         Past Surgical History:   Procedure Laterality Date   • APPENDECTOMY     • FOOT SURGERY     • KNEE SURGERY     • ORIF ULNA/RADIUS FRACTURES Left 6/15/2021    Procedure: ULNA/RADIUS OPEN REDUCTION INTERNAL FIXATION;  Surgeon: Norman Brown MD;  Location: [facility];  Service: Orthopedics;  Laterality: Left;  ULNA/RADIAL OPEN REDUCTION INTERNAL FIXATION   • WRIST SURGERY           Visit Dx:    ICD-10-CM ICD-9-CM   1. Closed Colles' fracture of left radius with routine healing, subsequent encounter  S52.532D V54.12       OT Ortho     Row Name 21 1200             Left Upper Ext    Lt Elbow Extension/Flexion AROM  -  -EN      Lt Elbow Pronation AROM  15  -EN      Lt Wrist Flexion AROM  20  -EN      Lt Wrist Extension AROM  20  -EN        User Key  (r) = Recorded By, (t) = Taken By, (c) = Cosigned By    Initials Name Provider Type    EN Bailee Casillas OTR Occupational Therapist             Hand Therapy (last 24 hours)      Hand Eval     Row Name 21 1200             Left Extension AROM    II- MP AROM  -31  -EN      II- PIP AROM  -20  -EN      II- DIP AROM  -18  -EN      II- BAPTISTE Left Extension AROM  -69  -EN      III- MP AROM  -30  -EN      III- PIP AROM  -45  -EN      III- DIP AROM  -34  -EN      III- BAPTISTE Left Extension AROM  -109  -EN      IV- MP AROM  -36  -EN      IV- PIP AROM  -45  -EN      IV- DIP AROM  -20  -EN       IV- BAPTISTE Left Extension AROM  -101  -EN      V- MP AROM  -15  -EN      V- PIP AROM  -36  -EN      V- DIP AROM  -31  -EN      V- BAPTISTE Left Extension AROM  -82  -EN         Left Flexion AROM    II- MP AROM  51  -EN      II- PIP AROM  31  -EN      II- DIP AROM  21  -EN      II- BAPTISTE Left Flexion AROM  103  -EN      III- MP AROM  50  -EN      III- PIP AROM  75  -EN      III- DIP AROM  55  -EN      III- BAPTISTE Left Flexion AROM  180  -EN      IV- MP AROM  60  -EN      IV- PIP AROM  69  -EN      IV- DIP AROM  44  -EN      IV- BAPTISTE Left Flexion AROM  173  -EN      V- MP AROM  42  -EN      V- PIP AROM  56  -EN      V- DIP AROM  44  -EN      V- BAPTISTE Left Flexion AROM  142  -EN         Left Thumb AROM    MP Flexion - AROM  36  -EN      MP Extension- AROM  -15  -EN      IP Flexion - AROM  24  -EN         Therapy Education    Given  HEP;Symptoms/condition management;Edema management  -EN      Program  Reinforced  -EN      How Provided  Verbal;Demonstration  -EN      Provided to  Patient  -EN      Level of Understanding  Teach back education performed;Verbalized;Demonstrated  -EN        User Key  (r) = Recorded By, (t) = Taken By, (c) = Cosigned By    Initials Name Provider Type    Bailee Estrada, OTR Occupational Therapist              Therapy Education  Given: HEP, Symptoms/condition management, Edema management  Program: Reinforced  How Provided: Verbal, Demonstration  Provided to: Patient  Level of Understanding: Teach back education performed, Verbalized, Demonstrated    OT Assessment/Plan     Row Name 08/13/21 1248          OT Assessment    Functional Limitations  Performance in leisure activities;Performance in self-care ADL;Performance in work activities;Limitation in home management  -EN     Impairments  Edema;Muscle strength;Pain;Range of motion  -EN     Assessment Comments  Patient's BAPTISTE in digits has improved as follows:  Digit 2 initial BAPTISTE was 24 degrees and has increased to 34 degres, Digit 3 improved from 50  degrees to 71 degrees, digit 4 improved from 47 degrees to 72 degrees, digit 5 improved from 46 degrees to 60 degrees and digit 1 MP flexion improved one degree/extension 6 degrees, IP flexion improved 12 degrees. No significant improvement since last visit with wrist or forearm AROM however elbow extension improved 5 degrees. Reinforced HEP and edema management.  -EN        OT Plan    OT Plan Comments  continue with goals.  -EN       User Key  (r) = Recorded By, (t) = Taken By, (c) = Cosigned By    Initials Name Provider Type    Bailee Estrada OTR Occupational Therapist           OT Goals     Row Name 08/13/21 1200          OT Short Term Goals    STG Date to Achieve  08/20/21  -EN     STG 1  Patient will demonstrate independence with HEP including ROM, edema management and scar care.  -EN     STG 1 Progress  Met  -EN     STG 2  Patient will improve BAPTISTE in left digits by 25 degrees for improved grasp.  -EN     STG 2 Progress  Ongoing;Progressing  -EN     STG 3  Patient will improve active L wrist flexion and extension by 15 degress.  -EN     STG 3 Progress  Ongoing;Progressing  -EN     STG 4  Patient will improve L forearm active pronation by 20 degrees.  -EN     STG 4 Progress  Ongoing;Progressing  -EN     STG 5  Patient will report 50% less pain during activity.  -EN     STG 5 Progress  Ongoing;Progressing  -EN     STG 6  Patient will improve left elbow AROM to WFL.  -EN     STG 6 Progress  Ongoing;Progressing  -EN        Long Term Goals    LTG Date to Achieve  09/17/21  -EN     LTG 1  Patient will demonstrate WFL left hand/wrist/forearm/elbow  AROM.  -EN     LTG 1 Progress  Ongoing  -EN     LTG 2  Patient will demonstrate WFL strength in L UE.  -EN     LTG 2 Progress  Ongoing  -EN     LTG 3  Patient will report pain 1/10 or less during activity.  -EN     LTG 3 Progress  Ongoing  -EN     LTG 4  Patient will report independence with all ADL/IADLs.  -EN     LTG 4 Progress  Ongoing  -EN       User Key  (r)  = Recorded By, (t) = Taken By, (c) = Cosigned By    Initials Name Provider Type    Bailee Estrada OTABRAHAM Occupational Therapist          Modalities     Row Name 08/13/21 1050             Moist Heat    MH Applied  Yes  -EN      Location  left wrist/hand  -EN      OT Moist Heat Minutes  15  -EN      MH Prior to Rx  Yes  -EN        User Key  (r) = Recorded By, (t) = Taken By, (c) = Cosigned By    Initials Name Provider Type    EN Bailee Casillas OTABRAHAM Occupational Therapist        OT Exercises     Row Name 08/13/21 1000             Exercise 1    Exercise Name 1  retrograde edema massage- left elbow/forearm/wrist/hand  -EN         Exercise 2    Exercise Name 2  ROM exercises, elbow, forearm, wrist and hand  -EN         Exercise 3    Exercise Name 3  fine motor coordination activities/gross grasp activities.  -EN         Exercise 4    Exercise Name 4  supination/pronation wheel  -EN        User Key  (r) = Recorded By, (t) = Taken By, (c) = Cosigned By    Initials Name Provider Type    Bailee Estrada OTR Occupational Therapist                        Time Calculation:   OT Start Time: 1052  OT Stop Time: 1208  OT Time Calculation (min): 76 min  Timed Charges  58650 - OT Therapeutic Activity Minutes: 61  Untimed Charges  OT Moist Heat Minutes: 15  Total Minutes  Timed Charges Total Minutes: 61  Untimed Charges Total Minutes: 15   Total Minutes: 61     Therapy Charges for Today     Code Description Service Date Service Provider Modifiers Qty    64106701827 HC OT THERAPEUTIC ACT EA 15 MIN 8/13/2021 Bailee Casillas OTR GO 4    [id number]  OT HOT OR COLD PACK TREAT MCARE 8/13/2021 Bailee Casillas OTR GO 1                    KENNETH Chavez  8/13/2021

## 2021-08-16 ENCOUNTER — HOSPITAL ENCOUNTER (OUTPATIENT)
Dept: OCCUPATIONAL THERAPY | Facility: HOSPITAL | Age: 71
Setting detail: THERAPIES SERIES
Discharge: HOME | End: 2021-08-16

## 2021-08-16 DIAGNOSIS — S52.532D CLOSED COLLES' FRACTURE OF LEFT RADIUS WITH ROUTINE HEALING, SUBSEQUENT ENCOUNTER: Primary | ICD-10-CM

## 2021-08-16 PROCEDURE — 97530 THERAPEUTIC ACTIVITIES: CPT

## 2021-08-16 NOTE — THERAPY TREATMENT NOTE
Outpatient Occupational Therapy Ortho Treatment Note   Norah Tejeda     Patient Name: Joan Block  : 1950  MRN: [id number]  Today's Date: 2021        Visit Date: 2021    Patient Active Problem List   Diagnosis   • Distal radial fracture   • Closed fracture of left distal radius   • Fracture, Colles, left, closed        Past Medical History:   Diagnosis Date   • Arthritis of back 8 yrs ago   • Fracture of wrist 2021   • Fracture, radius 2021   • Fracture, ulna 2021   • Hyperlipidemia    • Hypertension    • Tendinitis of knee         Past Surgical History:   Procedure Laterality Date   • APPENDECTOMY     • FOOT SURGERY     • KNEE SURGERY     • ORIF ULNA/RADIUS FRACTURES Left 6/15/2021    Procedure: ULNA/RADIUS OPEN REDUCTION INTERNAL FIXATION;  Surgeon: Norman Brown MD;  Location: [facility];  Service: Orthopedics;  Laterality: Left;  ULNA/RADIAL OPEN REDUCTION INTERNAL FIXATION   • WRIST SURGERY           Visit Dx:    ICD-10-CM ICD-9-CM   1. Closed Colles' fracture of left radius with routine healing, subsequent encounter  S52.532D V54.12       OT Ortho     Row Name 21 1200             Subjective Pain    Able to rate subjective pain?  yes  -EN      Pre-Treatment Pain Level  1  -EN      Post-Treatment Pain Level  1  -EN         Posture/Observations    Posture/Observations Comments  small scab over incision, moderate edema elbow/forearm/wrist and hand. Patient holding forearm in full supination with elbow in flexion, encouraged neutral forearm.  -EN         Left Upper Ext    Lt Elbow Extension/Flexion AROM  -  -EN      Lt Elbow Pronation AROM  25  -EN      Lt Wrist Flexion AROM  25  -EN      Lt Wrist Extension AROM  25  -EN        User Key  (r) = Recorded By, (t) = Taken By, (c) = Cosigned By    Initials Name Provider Type    EN Bailee Casillas, OTR Occupational Therapist                  Therapy Education  Given: HEP, Symptoms/condition management,  Edema management  Program: Reinforced, Progressed  How Provided: Verbal, Demonstration  Provided to: Patient  Level of Understanding: Teach back education performed, Verbalized, Demonstrated    OT Assessment/Plan     Row Name 08/16/21 1259          OT Assessment    Assessment Comments  Patient demonstrated improved wrist and forearm AROM. Continues to have significant edema. Pain around 1/10 today (at rest). Encouraged HEP and edema management.  -EN        OT Plan    OT Plan Comments  Continue with goals.  -EN       User Key  (r) = Recorded By, (t) = Taken By, (c) = Cosigned By    Initials Name Provider Type    Bailee Estrada OTR Occupational Therapist           OT Goals     Row Name 08/16/21 1200          OT Short Term Goals    STG Date to Achieve  08/20/21  -EN     STG 1  Patient will demonstrate independence with HEP including ROM, edema management and scar care.  -EN     STG 1 Progress  Met  -EN     STG 2  Patient will improve BAPTISTE in left digits by 25 degrees for improved grasp.  -EN     STG 2 Progress  Ongoing;Progressing  -EN     STG 3  Patient will improve active L wrist flexion and extension by 15 degress.  -EN     STG 3 Progress  Met  -EN     STG 4  Patient will improve L forearm active pronation by 20 degrees.  -EN     STG 4 Progress  Met  -EN     STG 5  Patient will report 50% less pain during activity.  -EN     STG 5 Progress  Ongoing;Progressing  -EN     STG 6  Patient will improve left elbow AROM to WFL.  -EN     STG 6 Progress  Ongoing;Progressing  -EN        Long Term Goals    LTG Date to Achieve  09/17/21  -EN     LTG 1  Patient will demonstrate WFL left hand/wrist/forearm/elbow  AROM.  -EN     LTG 1 Progress  Ongoing  -EN     LTG 2  Patient will demonstrate WFL strength in L UE.  -EN     LTG 2 Progress  Ongoing  -EN     LTG 3  Patient will report pain 1/10 or less during activity.  -EN     LTG 3 Progress  Ongoing  -EN     LTG 4  Patient will report independence with all ADL/IADLs.  -EN      LTG 4 Progress  Ongoing  -EN       User Key  (r) = Recorded By, (t) = Taken By, (c) = Cosigned By    Initials Name Provider Type    Bailee Estrada OTR Occupational Therapist          Modalities     Row Name 08/16/21 1100             Moist Heat    MH Applied  Yes  -EN      Location  left wrist/hand  -EN      OT Moist Heat Minutes  15  -EN      MH Prior to Rx  Yes  -EN        User Key  (r) = Recorded By, (t) = Taken By, (c) = Cosigned By    Initials Name Provider Type    EN Bailee Casillas OTR Occupational Therapist        OT Exercises     Row Name 08/16/21 1300             Exercise 1    Exercise Name 1  retrograde edema massage- left elbow/forearm/wrist/hand  -EN         Exercise 2    Exercise Name 2  ROM exercises, elbow, forearm, wrist and hand  -EN         Exercise 3    Exercise Name 3  fine motor coordination activities/gross grasp activities.  -EN         Exercise 4    Exercise Name 4  theraputty, yellow  -EN        User Key  (r) = Recorded By, (t) = Taken By, (c) = Cosigned By    Initials Name Provider Type    EN Bailee Casillas OTABRAHAM Occupational Therapist                        Time Calculation:   OT Start Time: 1048  OT Stop Time: 1203  OT Time Calculation (min): 75 min  Timed Charges  54310 - OT Therapeutic Activity Minutes: 60  Untimed Charges  OT Moist Heat Minutes: 15  Total Minutes  Timed Charges Total Minutes: 60  Untimed Charges Total Minutes: 15   Total Minutes: 60     Therapy Charges for Today     Code Description Service Date Service Provider Modifiers Qty    73955753765 HC OT THERAPEUTIC ACT EA 15 MIN 8/16/2021 Bailee Casillas OTR GO 4    [id number] HC OT HOT OR COLD PACK TREAT MCARE 8/16/2021 Bailee Casillas OTR GO 1                    KENNETH Chavez  8/16/2021

## 2021-08-18 ENCOUNTER — HOSPITAL ENCOUNTER (OUTPATIENT)
Dept: OCCUPATIONAL THERAPY | Facility: HOSPITAL | Age: 71
Setting detail: THERAPIES SERIES
Discharge: HOME | End: 2021-08-18

## 2021-08-18 DIAGNOSIS — S52.532D CLOSED COLLES' FRACTURE OF LEFT RADIUS WITH ROUTINE HEALING, SUBSEQUENT ENCOUNTER: Primary | ICD-10-CM

## 2021-08-18 PROCEDURE — 97530 THERAPEUTIC ACTIVITIES: CPT

## 2021-08-18 NOTE — THERAPY TREATMENT NOTE
Outpatient Occupational Therapy Ortho Treatment Note  [place]     Patient Name: Joan Block  : 1950  MRN: [id number]  Today's Date: 2021        Visit Date: 2021    Patient Active Problem List   Diagnosis   • Distal radial fracture   • Closed fracture of left distal radius   • Fracture, Colles, left, closed        Past Medical History:   Diagnosis Date   • Arthritis of back 8 yrs ago   • Fracture of wrist 2021   • Fracture, radius 2021   • Fracture, ulna 2021   • Hyperlipidemia    • Hypertension    • Tendinitis of knee         Past Surgical History:   Procedure Laterality Date   • APPENDECTOMY     • FOOT SURGERY     • KNEE SURGERY     • ORIF ULNA/RADIUS FRACTURES Left 6/15/2021    Procedure: ULNA/RADIUS OPEN REDUCTION INTERNAL FIXATION;  Surgeon: Norman Brown MD;  Location: [facility];  Service: Orthopedics;  Laterality: Left;  ULNA/RADIAL OPEN REDUCTION INTERNAL FIXATION   • WRIST SURGERY           Visit Dx:    ICD-10-CM ICD-9-CM   1. Closed Colles' fracture of left radius with routine healing, subsequent encounter  S52.532D V54.12       OT Ortho     Row Name 21 1000             Subjective Pain    Able to rate subjective pain?  yes  -EN      Pre-Treatment Pain Level  0  -EN         Posture/Observations    Posture/Observations Comments  small scab on distal incision, edema moderate  -EN         Left Upper Ext    Lt Elbow Pronation AROM  25  -EN      Lt Wrist Flexion AROM  25  -EN      Lt Wrist Extension AROM  26  -EN        User Key  (r) = Recorded By, (t) = Taken By, (c) = Cosigned By    Initials Name Provider Type    EN Bailee Casillas, OTR Occupational Therapist                  Therapy Education  Given: HEP, Symptoms/condition management, Edema management  Program: Reinforced  How Provided: Verbal, Demonstration  Provided to: Patient  Level of Understanding: Teach back education performed, Verbalized, Demonstrated    OT Assessment/Plan     Row  Name 08/18/21 1316          OT Assessment    Assessment Comments  Patient reports she has no pain today. States pain levels have decreased significantly since last visit. Patient with slight improvement with L wrist extension. Encouraged HEP and edema management.  -EN        OT Plan    OT Plan Comments  Continue with goals.  -EN       User Key  (r) = Recorded By, (t) = Taken By, (c) = Cosigned By    Initials Name Provider Type    Bailee Estrada OTR Occupational Therapist           OT Goals     Row Name 08/18/21 1000          OT Short Term Goals    STG Date to Achieve  08/20/21  -EN     STG 1  Patient will demonstrate independence with HEP including ROM, edema management and scar care.  -EN     STG 1 Progress  Met  -EN     STG 2  Patient will improve BPATISTE in left digits by 25 degrees for improved grasp.  -EN     STG 2 Progress  Ongoing;Progressing  -EN     STG 3  Patient will improve active L wrist flexion and extension by 15 degress.  -EN     STG 3 Progress  Met  -EN     STG 4  Patient will improve L forearm active pronation by 20 degrees.  -EN     STG 4 Progress  Met  -EN     STG 5  Patient will report 50% less pain during activity.  -EN     STG 5 Progress  Ongoing;Progressing  -EN     STG 6  Patient will improve left elbow AROM to WFL.  -EN     STG 6 Progress  Ongoing;Progressing  -EN        Long Term Goals    LTG Date to Achieve  09/17/21  -EN     LTG 1  Patient will demonstrate WFL left hand/wrist/forearm/elbow  AROM.  -EN     LTG 1 Progress  Ongoing  -EN     LTG 2  Patient will demonstrate WFL strength in L UE.  -EN     LTG 2 Progress  Ongoing  -EN     LTG 3  Patient will report pain 1/10 or less during activity.  -EN     LTG 3 Progress  Ongoing  -EN     LTG 4  Patient will report independence with all ADL/IADLs.  -EN     LTG 4 Progress  Ongoing  -EN       User Key  (r) = Recorded By, (t) = Taken By, (c) = Cosigned By    Initials Name Provider Type    Bailee Estrada OTR Occupational Therapist           Modalities     Row Name 08/18/21 1046             Moist Heat    MH Applied  Yes  -EN      Location  left wrist/hand  -EN      OT Moist Heat Minutes  15  -EN      MH Prior to Rx  Yes  -EN        User Key  (r) = Recorded By, (t) = Taken By, (c) = Cosigned By    Initials Name Provider Type    Bailee Estrada OTR Occupational Therapist        OT Exercises     Row Name 08/18/21 1000             Exercise 1    Exercise Name 1  retrograde edema massage- left elbow/forearm/wrist/hand  -EN         Exercise 2    Exercise Name 2  ROM exercises, elbow, forearm, wrist and hand  -EN         Exercise 3    Exercise Name 3  fine motor coordination activities/gross grasp activities.  -EN        User Key  (r) = Recorded By, (t) = Taken By, (c) = Cosigned By    Initials Name Provider Type    Bailee Estrada OTR Occupational Therapist                        Time Calculation:   OT Start Time: 1047  OT Stop Time: 1205  OT Time Calculation (min): 78 min  Timed Charges  28204 - OT Therapeutic Activity Minutes: 60  Untimed Charges  OT Moist Heat Minutes: 15  Total Minutes  Timed Charges Total Minutes: 60  Untimed Charges Total Minutes: 15   Total Minutes: 60     Therapy Charges for Today     Code Description Service Date Service Provider Modifiers Qty    53424893508 HC OT THERAPEUTIC ACT EA 15 MIN 8/18/2021 Bailee Casillas OTR GO 4    90773667780 HC OT HOT OR COLD PACK TREAT MCARE 8/18/2021 Bailee Casillas OTR GO 1                    KENNETH Chavez  8/18/2021

## 2021-08-20 ENCOUNTER — HOSPITAL ENCOUNTER (OUTPATIENT)
Dept: OCCUPATIONAL THERAPY | Facility: HOSPITAL | Age: 71
Setting detail: THERAPIES SERIES
Discharge: HOME | End: 2021-08-20

## 2021-08-20 DIAGNOSIS — S52.532D CLOSED COLLES' FRACTURE OF LEFT RADIUS WITH ROUTINE HEALING, SUBSEQUENT ENCOUNTER: Primary | ICD-10-CM

## 2021-08-20 PROCEDURE — 97530 THERAPEUTIC ACTIVITIES: CPT

## 2021-08-20 NOTE — THERAPY TREATMENT NOTE
Outpatient Occupational Therapy Ortho Treatment Note   [place]     Patient Name: Joan Block  : 1950  MRN: [id number]  Today's Date: 2021        Visit Date: 2021    Patient Active Problem List   Diagnosis   • Distal radial fracture   • Closed fracture of left distal radius   • Fracture, Colles, left, closed        Past Medical History:   Diagnosis Date   • Arthritis of back 8 yrs ago   • Fracture of wrist 2021   • Fracture, radius 2021   • Fracture, ulna 2021   • Hyperlipidemia    • Hypertension    • Tendinitis of knee         Past Surgical History:   Procedure Laterality Date   • APPENDECTOMY     • FOOT SURGERY     • KNEE SURGERY     • ORIF ULNA/RADIUS FRACTURES Left 6/15/2021    Procedure: ULNA/RADIUS OPEN REDUCTION INTERNAL FIXATION;  Surgeon: Norman Brown MD;  Location: [facility];  Service: Orthopedics;  Laterality: Left;  ULNA/RADIAL OPEN REDUCTION INTERNAL FIXATION   • WRIST SURGERY           Visit Dx:    ICD-10-CM ICD-9-CM   1. Closed Colles' fracture of left radius with routine healing, subsequent encounter  S52.532D V54.12       OT Ortho     Row Name 21 1000             Subjective Pain    Able to rate subjective pain?  yes  -EN      Pre-Treatment Pain Level  0  -EN      Subjective Pain Comment  Patient reports pain has been 5-8/10 the past couple days but today is 0/10.  -EN         Posture/Observations    Posture/Observations Comments  iincision looks great, no scabs, encouraged scar massage with vitamin e oil.  -EN         Left Upper Ext    Lt Elbow Pronation AROM  30  -EN      Lt Wrist Flexion AROM  25  -EN      Lt Wrist Extension AROM  28  -EN        User Key  (r) = Recorded By, (t) = Taken By, (c) = Cosigned By    Initials Name Provider Type    EN Bailee Casillas OTR Occupational Therapist             Hand Therapy (last 24 hours)      Hand Eval     Row Name 21 1100             Left Extension AROM    II- MP AROM  -21  -EN       II- PIP AROM  -24  -EN      II- DIP AROM  -12  -EN      II- BAPTISTE Left Extension AROM  -57  -EN      III- MP AROM  -21  -EN      III- PIP AROM  -36  -EN      III- DIP AROM  -25  -EN      III- BAPTISTE Left Extension AROM  -82  -EN      IV- MP AROM  -21  -EN      IV- PIP AROM  -36  -EN      IV- DIP AROM  -23  -EN      IV- BAPTISTE Left Extension AROM  -80  -EN      V- MP AROM  -12  -EN      V- PIP AROM  -25  -EN      V- DIP AROM  -20  -EN      V- BAPTISTE Left Extension AROM  -57  -EN         Left Flexion AROM    II- MP AROM  43  -EN      II- PIP AROM  40  -EN      II- DIP AROM  25  -EN      II- BAPTISTE Left Flexion AROM  108  -EN      III- MP AROM  45  -EN      III- PIP AROM  76  -EN      III- DIP AROM  50  -EN      III- BAPTISTE Left Flexion AROM  171  -EN      IV- MP AROM  55  -EN      IV- PIP AROM  65  -EN      IV- DIP AROM  41  -EN      IV- BAPTISTE Left Flexion AROM  161  -EN      V- MP AROM  50  -EN      V- PIP AROM  50  -EN      V- DIP AROM  40  -EN      V- BAPTISTE Left Flexion AROM  140  -EN         Therapy Education    Given  HEP;Symptoms/condition management;Edema management  -EN      Program  Reinforced  -EN      How Provided  Verbal;Demonstration  -EN      Provided to  Patient  -EN      Level of Understanding  Teach back education performed;Verbalized;Demonstrated  -EN        User Key  (r) = Recorded By, (t) = Taken By, (c) = Cosigned By    Initials Name Provider Type    Bailee Estrada, OTR Occupational Therapist              Therapy Education  Given: HEP, Symptoms/condition management, Edema management  Program: Reinforced  How Provided: Verbal, Demonstration  Provided to: Patient  Level of Understanding: Teach back education performed, Verbalized, Demonstrated    OT Assessment/Plan     Row Name 08/20/21 1398          OT Assessment    Functional Limitations  Performance in leisure activities;Performance in self-care ADL;Performance in work activities;Limitation in home management  -EN     Impairments  Edema;Muscle  strength;Pain;Range of motion  -EN     Assessment Comments  Patient demonstrated improved BAPTISTE in all digits compared to last week. Digit 2 BAPTISTE improved from 34 degreesTAM to 51 degrees, digit 3 improved from 71 degrees to 89 degrees, digit 4 improved from 72 degrees to 81 degrees, and digit 5 iproved from 60 degrees to 83 degrees. Wrist extension and forearm pronation have  improved from last visit. Patient reports pain increased the past couple days however today she is having no pain. Reinfoced edema management, scar massage and ROM exercises.  -EN        OT Plan    OT Plan Comments  Continue with goals.  -EN       User Key  (r) = Recorded By, (t) = Taken By, (c) = Cosigned By    Initials Name Provider Type    Bailee Estrada, OTR Occupational Therapist           OT Goals     Row Name 08/20/21 1100          OT Short Term Goals    STG Date to Achieve  08/20/21  -EN     STG 1  Patient will demonstrate independence with HEP including ROM, edema management and scar care.  -EN     STG 1 Progress  Met  -EN     STG 2  Patient will improve BAPTISTE in left digits by 25 degrees for improved grasp.  -EN     STG 2 Progress  Met  -EN     STG 3  Patient will improve active L wrist flexion and extension by 15 degress.  -EN     STG 3 Progress  Met  -EN     STG 4  Patient will improve L forearm active pronation by 20 degrees.  -EN     STG 4 Progress  Met  -EN     STG 5  Patient will report 50% less pain during activity.  -EN     STG 5 Progress  Ongoing;Progressing  -EN     STG 6  Patient will improve left elbow AROM to WFL.  -EN     STG 6 Progress  Ongoing;Progressing  -EN        Long Term Goals    LTG Date to Achieve  09/17/21  -EN     LTG 1  Patient will demonstrate WFL left hand/wrist/forearm/elbow  AROM.  -EN     LTG 1 Progress  Ongoing  -EN     LTG 2  Patient will demonstrate WFL strength in L UE.  -EN     LTG 2 Progress  Ongoing  -EN     LTG 3  Patient will report pain 1/10 or less during activity.  -EN     LTG 3  Progress  Ongoing  -EN     LTG 4  Patient will report independence with all ADL/IADLs.  -EN     LTG 4 Progress  Ongoing  -EN       User Key  (r) = Recorded By, (t) = Taken By, (c) = Cosigned By    Initials Name Provider Type    Bailee Estrada OTR Occupational Therapist            OT Exercises     Row Name 08/20/21 1300             Exercise 1    Exercise Name 1  retrograde edema massage- left elbow/forearm/wrist/hand  -EN         Exercise 2    Exercise Name 2  ROM exercises, elbow, forearm, wrist and hand  -EN         Exercise 3    Exercise Name 3  fine motor coordination activities/gross grasp activities.  -EN         Exercise 4    Exercise Name 4  supination/pronation wheel (A/AROM)  -EN        User Key  (r) = Recorded By, (t) = Taken By, (c) = Cosigned By    Initials Name Provider Type    Bailee Estrada OTR Occupational Therapist                        Time Calculation:   OT Start Time: 1045  OT Stop Time: 1207  OT Time Calculation (min): 82 min  Timed Charges  45460 - OT Therapeutic Activity Minutes: 60  Total Minutes  Timed Charges Total Minutes: 60   Total Minutes: 60     Therapy Charges for Today     Code Description Service Date Service Provider Modifiers Qty    32101785148  OT THERAPEUTIC ACT EA 15 MIN 8/20/2021 Bailee Casillas OTR GO 4    [id number]  OT HOT OR COLD PACK TREAT MCARE 8/20/2021 Bailee Casillas OTR GO 1                    KENNETH Chavez  8/20/2021

## 2021-08-23 ENCOUNTER — HOSPITAL ENCOUNTER (OUTPATIENT)
Dept: OCCUPATIONAL THERAPY | Facility: HOSPITAL | Age: 71
Setting detail: THERAPIES SERIES
Discharge: HOME | End: 2021-08-23

## 2021-08-23 DIAGNOSIS — S52.532D CLOSED COLLES' FRACTURE OF LEFT RADIUS WITH ROUTINE HEALING, SUBSEQUENT ENCOUNTER: Primary | ICD-10-CM

## 2021-08-23 PROCEDURE — 97530 THERAPEUTIC ACTIVITIES: CPT

## 2021-08-23 NOTE — THERAPY TREATMENT NOTE
Outpatient Occupational Therapy Ortho Treatment Note   [facility]     Patient Name: Joan Block  : 1950  MRN: [id number]  Today's Date: 2021        Visit Date: 2021    Patient Active Problem List   Diagnosis   • Distal radial fracture   • Closed fracture of left distal radius   • Fracture, Colles, left, closed        Past Medical History:   Diagnosis Date   • Arthritis of back 8 yrs ago   • Fracture of wrist 2021   • Fracture, radius 2021   • Fracture, ulna 2021   • Hyperlipidemia    • Hypertension    • Tendinitis of knee         Past Surgical History:   Procedure Laterality Date   • APPENDECTOMY     • FOOT SURGERY     • KNEE SURGERY     • ORIF ULNA/RADIUS FRACTURES Left 6/15/2021    Procedure: ULNA/RADIUS OPEN REDUCTION INTERNAL FIXATION;  Surgeon: Norman Brown MD;  Location: [facility] [place];  Service: Orthopedics;  Laterality: Left;  ULNA/RADIAL OPEN REDUCTION INTERNAL FIXATION   • WRIST SURGERY           Visit Dx:    ICD-10-CM ICD-9-CM   1. Closed Colles' fracture of left radius with routine healing, subsequent encounter  S52.532D V54.12       OT Ortho     Row Name 21 1100 21 1000          Subjective Pain    Able to rate subjective pain?  --  yes  -EN     Pre-Treatment Pain Level  --  0  -EN     Subjective Pain Comment  --  Pt reports pain some this weekend up to 5-8/10, however none today.  -EN        Left Upper Ext    Lt Elbow Extension/Flexion AROM  -  -EN  --     Lt Elbow Pronation AROM  32  -EN  --     Lt Wrist Flexion AROM  25  -EN  --     Lt Wrist Extension AROM  29  -EN  --       User Key  (r) = Recorded By, (t) = Taken By, (c) = Cosigned By    Initials Name Provider Type    EN Bailee Casillas OTR Occupational Therapist                  Therapy Education  Given: HEP, Symptoms/condition management, Edema management  Program: Reinforced, Progressed  How Provided: Verbal, Demonstration  Provided to: Patient  Level of Understanding:  Teach back education performed, Verbalized, Demonstrated    OT Assessment/Plan     Row Name 08/23/21 1223          OT Assessment    Functional Limitations  Performance in leisure activities;Performance in self-care ADL;Performance in work activities;Limitation in home management  -EN     Impairments  Edema;Muscle strength;Pain;Range of motion  -EN     Assessment Comments  Slight improvement in forearm pronation and wrist extension. Pain up and down since last visit, 0/10 today at rest. Reinforced HEP and edema management.  -EN        OT Plan    OT Plan Comments  Continue with goals.  -EN       User Key  (r) = Recorded By, (t) = Taken By, (c) = Cosigned By    Initials Name Provider Type    Bailee Estrada, OTR Occupational Therapist           OT Goals     Row Name 08/23/21 1045          OT Short Term Goals    STG Date to Achieve  08/20/21  -EN     STG 1  Patient will demonstrate independence with HEP including ROM, edema management and scar care.  -EN     STG 1 Progress  Met  -EN     STG 2  Patient will improve BAPTISTE in left digits by 25 degrees for improved grasp.  -EN     STG 2 Progress  Met  -EN     STG 3  Patient will improve active L wrist flexion and extension by 15 degress.  -EN     STG 3 Progress  Met  -EN     STG 4  Patient will improve L forearm active pronation by 20 degrees.  -EN     STG 4 Progress  Met  -EN     STG 5  Patient will report 50% less pain during activity.  -EN     STG 5 Progress  Ongoing;Progressing  -EN     STG 6  Patient will improve left elbow AROM to WFL.  -EN     STG 6 Progress  Ongoing;Progressing  -EN        Long Term Goals    LTG Date to Achieve  09/17/21  -EN     LTG 1  Patient will demonstrate WFL left hand/wrist/forearm/elbow  AROM.  -EN     LTG 1 Progress  Ongoing  -EN     LTG 2  Patient will demonstrate WFL strength in L UE.  -EN     LTG 2 Progress  Ongoing  -EN     LTG 3  Patient will report pain 1/10 or less during activity.  -EN     LTG 3 Progress  Ongoing  -EN     LTG  4  Patient will report independence with all ADL/IADLs.  -EN     LTG 4 Progress  Ongoing  -EN       User Key  (r) = Recorded By, (t) = Taken By, (c) = Cosigned By    Initials Name Provider Type    Bailee Estrada OTR Occupational Therapist          Modalities     Row Name 08/23/21 1045             Moist Heat    MH Applied  Yes  -EN      Location  left wrist/hand  -EN      OT Moist Heat Minutes  15  -EN      MH Prior to Rx  Yes  -EN        User Key  (r) = Recorded By, (t) = Taken By, (c) = Cosigned By    Initials Name Provider Type    Bailee Estrada, OTR Occupational Therapist        OT Exercises     Row Name 08/23/21 1045             Exercise 1    Exercise Name 1  retrograde edema massage- left elbow/forearm/wrist/hand  -EN         Exercise 2    Exercise Name 2  ROM exercises, elbow, forearm, wrist and hand  -EN         Exercise 3    Exercise Name 3  fine motor coordination activities- picked up 4 nine hole pegs and placed in board.  -EN         Exercise 4    Exercise Name 4  supination/pronation wheel (A/AROM)  -EN         Exercise 5    Exercise Name 5  light pinch strengthening X 10 with yellow resisitive pin  -EN        User Key  (r) = Recorded By, (t) = Taken By, (c) = Cosigned By    Initials Name Provider Type    Bailee Estrada OTR Occupational Therapist                        Time Calculation:   OT Start Time: 1045  OT Stop Time: 1206  OT Time Calculation (min): 81 min  Timed Charges  14863 - OT Therapeutic Activity Minutes: 65  Untimed Charges  OT Moist Heat Minutes: 15  Total Minutes  Timed Charges Total Minutes: 65  Untimed Charges Total Minutes: 15   Total Minutes: 65     Therapy Charges for Today     Code Description Service Date Service Provider Modifiers Qty    65805895615 HC OT THERAPEUTIC ACT EA 15 MIN 8/23/2021 Bailee Casillas OTR GO 4    76709970159 HC OT HOT OR COLD PACK TREAT MCARE 8/23/2021 Bailee Casillas OTR GO 1                    Bailee Casillas  OTR  8/23/2021

## 2021-08-25 ENCOUNTER — OFFICE VISIT (OUTPATIENT)
Dept: ORTHOPEDIC SURGERY | Facility: CLINIC | Age: 71
End: 2021-08-25

## 2021-08-25 ENCOUNTER — HOSPITAL ENCOUNTER (OUTPATIENT)
Dept: OCCUPATIONAL THERAPY | Facility: HOSPITAL | Age: 71
Setting detail: THERAPIES SERIES
Discharge: HOME | End: 2021-08-25

## 2021-08-25 VITALS — WEIGHT: 181 LBS | HEIGHT: 62 IN | BODY MASS INDEX: 33.31 KG/M2

## 2021-08-25 DIAGNOSIS — S52.532A CLOSED COLLES' FRACTURE OF LEFT RADIUS, INITIAL ENCOUNTER: Primary | ICD-10-CM

## 2021-08-25 DIAGNOSIS — S52.532D CLOSED COLLES' FRACTURE OF LEFT RADIUS WITH ROUTINE HEALING, SUBSEQUENT ENCOUNTER: Primary | ICD-10-CM

## 2021-08-25 DIAGNOSIS — Z09 STATUS POST ORTHOPEDIC SURGERY, FOLLOW-UP EXAM: ICD-10-CM

## 2021-08-25 PROCEDURE — 73110 X-RAY EXAM OF WRIST: CPT | Performed by: ORTHOPAEDIC SURGERY

## 2021-08-25 PROCEDURE — 99024 POSTOP FOLLOW-UP VISIT: CPT | Performed by: ORTHOPAEDIC SURGERY

## 2021-08-25 PROCEDURE — 97530 THERAPEUTIC ACTIVITIES: CPT

## 2021-08-25 RX ORDER — PREGABALIN 75 MG/1
75 CAPSULE ORAL 2 TIMES DAILY
Qty: 60 CAPSULE | Refills: 0 | Status: SHIPPED | OUTPATIENT
Start: 2021-08-25 | End: 2021-10-27 | Stop reason: SDUPTHER

## 2021-08-25 NOTE — THERAPY TREATMENT NOTE
Outpatient Occupational Therapy Ortho Treatment Note   [place]     Patient Name: Joan Block  : 1950  MRN: [id number]  Today's Date: 2021        Visit Date: 2021    Patient Active Problem List   Diagnosis   • Distal radial fracture   • Closed fracture of left distal radius   • Fracture, Colles, left, closed        Past Medical History:   Diagnosis Date   • Arthritis of back 8 yrs ago   • Fracture of wrist 2021   • Fracture, radius 2021   • Fracture, ulna 2021   • Hyperlipidemia    • Hypertension    • Tendinitis of knee         Past Surgical History:   Procedure Laterality Date   • APPENDECTOMY     • FOOT SURGERY     • KNEE SURGERY     • ORIF ULNA/RADIUS FRACTURES Left 6/15/2021    Procedure: ULNA/RADIUS OPEN REDUCTION INTERNAL FIXATION;  Surgeon: Norman Brown MD;  Location: [facility];  Service: Orthopedics;  Laterality: Left;  ULNA/RADIAL OPEN REDUCTION INTERNAL FIXATION   • WRIST SURGERY           Visit Dx:    ICD-10-CM ICD-9-CM   1. Closed Colles' fracture of left radius with routine healing, subsequent encounter  S52.532D V54.12       OT Ortho     Row Name 21 1100             Left Upper Ext    Lt Elbow Extension/Flexion AROM  -  -EN      Lt Elbow Pronation AROM  40  -EN      Lt Wrist Flexion AROM  30  -EN      Lt Wrist Extension AROM  37  -EN        User Key  (r) = Recorded By, (t) = Taken By, (c) = Cosigned By    Initials Name Provider Type    EN Bailee Casillas, OTR Occupational Therapist                  Therapy Education  Given: HEP, Symptoms/condition management, Edema management  Program: Reinforced, Progressed  How Provided: Verbal, Demonstration  Provided to: Patient  Level of Understanding: Teach back education performed, Verbalized, Demonstrated    OT Assessment/Plan     Row Name 21 1202          OT Assessment    Functional Limitations  Performance in leisure activities;Performance in self-care ADL;Performance in work  activities;Limitation in home management  -EN     Impairments  Edema;Muscle strength;Pain;Range of motion  -EN     Assessment Comments  Improvement in wrist flexion and extension today as well as forearm pronation. Slight improvement in elbow extension. Patient reports no pain the past two days. Patient able to use a pincer grasp and  small objects and place in a container. Encouraged patient to continue to work on using the left hand and to continue with HEP/Edema mangement techniques.  -EN        OT Plan    OT Plan Comments  MD appointment today. Continue with plan of care.  -EN       User Key  (r) = Recorded By, (t) = Taken By, (c) = Cosigned By    Initials Name Provider Type    Bailee Estrada, OTR Occupational Therapist           OT Goals     Row Name 08/25/21 1200          OT Short Term Goals    STG Date to Achieve  08/20/21  -EN     STG 1  Patient will demonstrate independence with HEP including ROM, edema management and scar care.  -EN     STG 1 Progress  Met  -EN     STG 2  Patient will improve BAPTISTE in left digits by 25 degrees for improved grasp.  -EN     STG 2 Progress  Met  -EN     STG 3  Patient will improve active L wrist flexion and extension by 15 degress.  -EN     STG 3 Progress  Met  -EN     STG 4  Patient will improve L forearm active pronation by 20 degrees.  -EN     STG 4 Progress  Met  -EN     STG 5  Patient will report 50% less pain during activity.  -EN     STG 5 Progress  Ongoing;Progressing  -EN     STG 5 Progress Comments  reports no pain the past 2 days  -EN     STG 6  Patient will improve left elbow AROM to WFL.  -EN     STG 6 Progress  Ongoing;Progressing  -EN        Long Term Goals    LTG Date to Achieve  09/17/21  -EN     LTG 1  Patient will demonstrate WFL left hand/wrist/forearm/elbow  AROM.  -EN     LTG 1 Progress  Ongoing  -EN     LTG 2  Patient will demonstrate WFL strength in L UE.  -EN     LTG 2 Progress  Ongoing  -EN     LTG 3  Patient will report pain 1/10 or  less during activity.  -EN     LTG 3 Progress  Ongoing  -EN     LTG 4  Patient will report independence with all ADL/IADLs.  -EN     LTG 4 Progress  Ongoing  -EN       User Key  (r) = Recorded By, (t) = Taken By, (c) = Cosigned By    Initials Name Provider Type    EN Bailee Casillas OTR Occupational Therapist          Modalities     Row Name 08/25/21 1100             Moist Heat    MH Applied  Yes  -EN      Location  left wrist/hand  -EN      OT Moist Heat Minutes  15  -EN      MH Prior to Rx  Yes  -EN        User Key  (r) = Recorded By, (t) = Taken By, (c) = Cosigned By    Initials Name Provider Type    Bailee Estrada OTR Occupational Therapist        OT Exercises     Row Name 08/25/21 1200             Exercise 1    Exercise Name 1  retrograde edema massage- left elbow/forearm/wrist/hand  -EN         Exercise 2    Exercise Name 2  ROM exercises, elbow, forearm, wrist and hand  -EN         Exercise 3    Exercise Name 3  fine motor coordination activities- picked up 10 small buttons using a pincer grasp and placed in container  -EN         Exercise 4    Exercise Name 4  supination/pronation wheel (A/AROM)  -EN        User Key  (r) = Recorded By, (t) = Taken By, (c) = Cosigned By    Initials Name Provider Type    Bailee Estrada OTR Occupational Therapist                        Time Calculation:   OT Start Time: 1048  OT Stop Time: 1203  OT Time Calculation (min): 75 min  Timed Charges  82463 - OT Therapeutic Activity Minutes: 60  Untimed Charges  OT Moist Heat Minutes: 15  Total Minutes  Timed Charges Total Minutes: 60  Untimed Charges Total Minutes: 15   Total Minutes: 60     Therapy Charges for Today     Code Description Service Date Service Provider Modifiers Qty    51856618180 HC OT HOT OR COLD PACK TREAT MCARE 8/25/2021 Bailee Casillas OTR GO 1    11839993556 HC OT THERAPEUTIC ACT EA 15 MIN 8/25/2021 Bailee Casillas OTR GO 4                    Bailee Casillas  OTR  8/25/2021

## 2021-08-25 NOTE — PROGRESS NOTES
Subjective: Status post ORIF left distal radial fracture     Patient ID: Joan Block is a 71 y.o. female.    Chief Complaint:    History of Present Illness patient is a little more than 2 months out.  Making progress having minimal pain at this time controlled with Motrin and the Lyrica.  Is progressing with therapy still dealing with stiffness and swelling in the hand.       Social History     Occupational History   • Not on file   Tobacco Use   • Smoking status: Former Smoker     Packs/day: 0.50     Years: 15.00     Pack years: 7.50     Types: Cigarettes     Start date: 1970     Quit date: 10/1/1985     Years since quittin.9   • Smokeless tobacco: Never Used   Vaping Use   • Vaping Use: Never used   Substance and Sexual Activity   • Alcohol use: No   • Drug use: No   • Sexual activity: Not Currently     Partners: Male     Birth control/protection: Post-menopausal      Review of Systems      Past Medical History:   Diagnosis Date   • Arthritis of back 8 yrs ago   • Fracture of wrist 2021   • Fracture, radius 2021   • Fracture, ulna 2021   • Hyperlipidemia    • Hypertension    • Tendinitis of knee      Past Surgical History:   Procedure Laterality Date   • APPENDECTOMY     • FOOT SURGERY     • KNEE SURGERY     • ORIF ULNA/RADIUS FRACTURES Left 6/15/2021    Procedure: ULNA/RADIUS OPEN REDUCTION INTERNAL FIXATION;  Surgeon: Norman Brown MD;  Location: [facility];  Service: Orthopedics;  Laterality: Left;  ULNA/RADIAL OPEN REDUCTION INTERNAL FIXATION   • WRIST SURGERY       Family History   Problem Relation Age of Onset   • Cancer Mother    • Breast cancer Mother    • Diabetes Father          Objective:  There were no vitals filed for this visit.      21  1257   Weight: 82.1 kg (181 lb)     Body mass index is 33.11 kg/m².        Ortho Exam   AP lateral oblique views show anatomic alignment of the fracture compared with previous x-rays the fracture does now appear healed.   She is alert and oriented x3.  She can flex to about 130 degrees and lacks still about 45 degrees of extension of her forearm has full pronation can supinate to about 90 degrees.  Still dealing with significant swelling in the hands.  She can flex the long, ring and little finger about 45 degrees the index finger only about 10 to 15 degrees.  She can dorsiflex approximately 5 degrees and volar flex about 5 degrees.  She has good capillary refill.  There is no motor or sensory deficit.  Skin is cool to touch.    Assessment:        1. Closed Colles' fracture of left radius, initial encounter    2. Status post orthopedic surgery, follow-up exam            Plan: We will continue the physical therapy but also going to get her fitted for a edema or compressive glove for the hand to help get the swelling out of the finger so she can improve her motion.  Keep working on that therapy to the wrist and the elbow return to see me in a month no x-ray necessary.            Work Status:    [email or web address] query complete.    Orders:  Orders Placed This Encounter   Procedures   • XR Wrist 3+ View Left       Medications:  New Medications Ordered This Visit   Medications   • pregabalin (Lyrica) 75 MG capsule     Sig: Take 1 capsule by mouth 2 (Two) Times a Day.     Dispense:  60 capsule     Refill:  0       Followup:  Return in about 4 weeks (around 9/22/2021).          Dictated utilizing Dragon dictation

## 2021-08-27 ENCOUNTER — HOSPITAL ENCOUNTER (OUTPATIENT)
Dept: OCCUPATIONAL THERAPY | Facility: HOSPITAL | Age: 71
Setting detail: THERAPIES SERIES
Discharge: HOME | End: 2021-08-27

## 2021-08-27 DIAGNOSIS — S52.532D CLOSED COLLES' FRACTURE OF LEFT RADIUS WITH ROUTINE HEALING, SUBSEQUENT ENCOUNTER: Primary | ICD-10-CM

## 2021-08-27 PROCEDURE — 97530 THERAPEUTIC ACTIVITIES: CPT

## 2021-08-27 NOTE — THERAPY TREATMENT NOTE
"Outpatient Occupational Therapy Ortho Treatment Note   [place]     Patient Name: Joan Block  : 1950  MRN: [id number]  Today's Date: 2021        Visit Date: 2021    Patient Active Problem List   Diagnosis   • Distal radial fracture   • Closed fracture of left distal radius   • Fracture, Colles, left, closed        Past Medical History:   Diagnosis Date   • Arthritis of back 8 yrs ago   • Fracture of wrist 2021   • Fracture, radius 2021   • Fracture, ulna 2021   • Hyperlipidemia    • Hypertension    • Tendinitis of knee         Past Surgical History:   Procedure Laterality Date   • APPENDECTOMY     • FOOT SURGERY     • KNEE SURGERY     • ORIF ULNA/RADIUS FRACTURES Left 6/15/2021    Procedure: ULNA/RADIUS OPEN REDUCTION INTERNAL FIXATION;  Surgeon: Norman Brown MD;  Location: [facility];  Service: Orthopedics;  Laterality: Left;  ULNA/RADIAL OPEN REDUCTION INTERNAL FIXATION   • WRIST SURGERY           Visit Dx:    ICD-10-CM ICD-9-CM   1. Closed Colles' fracture of left radius with routine healing, subsequent encounter  S52.532D V54.12       OT Ortho     Row Name 21 1200             Subjective Comments    Subjective Comments  Patient stated, \"He wants me to continue therapy 4 more weeks.\"  -EN         Subjective Pain    Able to rate subjective pain?  yes  -EN      Pre-Treatment Pain Level  0  -EN      Post-Treatment Pain Level  0  -EN      Subjective Pain Comment  Reported pain during PROM in all digits, wrist, forearm and elbow.   -EN        User Key  (r) = Recorded By, (t) = Taken By, (c) = Cosigned By    Initials Name Provider Type    EN Bailee Casillas OTR Occupational Therapist             Hand Therapy (last 24 hours)      Hand Eval     Row Name 21 1200             Left Extension AROM    II- MP AROM  -11  -EN      II- PIP AROM  -35  -EN      II- DIP AROM  -10  -EN      II- BAPTISTE Left Extension AROM  -56  -EN      III- MP AROM  -12  -EN      " III- PIP AROM  -50  -EN      III- DIP AROM  -33  -EN      III- BAPTISTE Left Extension AROM  -95  -EN      IV- MP AROM  -32  -EN      IV- PIP AROM  -48  -EN      IV- DIP AROM  -25  -EN      IV- BAPTISTE Left Extension AROM  -105  -EN      V- MP AROM  -11  -EN      V- PIP AROM  -25  -EN      V- DIP AROM  -25  -EN      V- BAPTISTE Left Extension AROM  -61  -EN         Left Flexion AROM    II- MP AROM  57  -EN      II- PIP AROM  41  -EN      II- DIP AROM  30  -EN      II- BAPTISTE Left Flexion AROM  128  -EN      III- MP AROM  49  -EN      III- PIP AROM  85  -EN      III- DIP AROM  63  -EN      III- BAPTISTE Left Flexion AROM  197  -EN      IV- MP AROM  56  -EN      IV- PIP AROM  85  -EN      IV- DIP AROM  50  -EN      IV- BAPTISTE Left Flexion AROM  191  -EN      V- MP AROM  45  -EN      V- PIP AROM  70  -EN      V- DIP AROM  55  -EN      V- BAPTISTE Left Flexion AROM  170  -EN         Left Thumb AROM    MP Flexion - AROM  45  -EN      MP Extension- AROM  -16  -EN      IP Flexion - AROM  26  -EN         Therapy Education    Given  HEP;Symptoms/condition management;Edema management  -EN      Program  Reinforced;Progressed  -EN      How Provided  Verbal;Demonstration  -EN      Provided to  Patient  -EN      Level of Understanding  Teach back education performed;Verbalized;Demonstrated  -EN        User Key  (r) = Recorded By, (t) = Taken By, (c) = Cosigned By    Initials Name Provider Type    Bailee Estrada, OTR Occupational Therapist              Therapy Education  Given: HEP, Symptoms/condition management, Edema management  Program: Reinforced, Progressed  How Provided: Verbal, Demonstration  Provided to: Patient  Level of Understanding: Teach back education performed, Verbalized, Demonstrated    OT Assessment/Plan     Row Name 08/27/21 1239          OT Assessment    Functional Limitations  Performance in leisure activities;Performance in self-care ADL;Performance in work activities;Limitation in home management  -EN     Impairments   Edema;Muscle strength;Pain;Range of motion  -EN     Assessment Comments  Patient reports she continues to be pain free and reports she is using her hand more for fine motor activities. Patient demonstrated improved BAPTISTE in all digits. Digit 2 BAPTISTE improved from 51 degrees to 72 degrees, digit 3 improved from 89 degrees to 102 degrees, digit 4 improved from 81 degrees to 86 degrees, and digit 5 improved from 83 degrees to 109 degrees, Patient also had improved flexion in thumb IP and MP. Patient able to tolerate edema glove today. Encouraged patient to wear during the day. Reinforced HEP and edema management techniques.  -EN        OT Plan    OT Plan Comments  Continue with goals. Pt reports MD stated she will need 4 more weeks of OT.  -EN       User Key  (r) = Recorded By, (t) = Taken By, (c) = Cosigned By    Initials Name Provider Type    Bailee Estrada, OTR Occupational Therapist           OT Goals     Row Name 08/27/21 1200          OT Short Term Goals    STG Date to Achieve  09/03/21  -EN     STG 1  Patient will demonstrate independence with HEP including ROM, edema management and scar care.  -EN     STG 1 Progress  Met  -EN     STG 2  Patient will improve BAPTISTE in left digits by 25 degrees for improved grasp.  -EN     STG 2 Progress  Met  -EN     STG 3  Patient will improve active L wrist flexion and extension by 15 degress.  -EN     STG 3 Progress  Met  -EN     STG 4  Patient will improve L forearm active pronation by 20 degrees.  -EN     STG 4 Progress  Met  -EN     STG 5  Patient will report 50% less pain during activity.  -EN     STG 5 Progress  Met  -EN     STG 5 Progress Comments  No pain in a week during FMC or at rest, some pain with PROM  -EN     STG 6  Patient will improve left elbow AROM to WFL.  -EN     STG 6 Progress  Ongoing;Progressing  -EN        Long Term Goals    LTG Date to Achieve  10/01/21  -EN     LTG 1  Patient will demonstrate WFL left hand/wrist/forearm/elbow  AROM.  -EN     LTG 1  Progress  Ongoing  -EN     LTG 2  Patient will demonstrate WFL strength in L UE.  -EN     LTG 2 Progress  Ongoing  -EN     LTG 3  Patient will report pain 1/10 or less during activity.  -EN     LTG 3 Progress  Ongoing  -EN     LTG 4  Patient will report independence with all ADL/IADLs.  -EN     LTG 4 Progress  Ongoing  -EN       User Key  (r) = Recorded By, (t) = Taken By, (c) = Cosigned By    Initials Name Provider Type    Bailee Estrada OTR Occupational Therapist                              Time Calculation:   OT Start Time: 1047  OT Stop Time: 1202  OT Time Calculation (min): 75 min  Timed Charges  77343 - OT Therapeutic Activity Minutes: 60  Total Minutes  Timed Charges Total Minutes: 60   Total Minutes: 60     Therapy Charges for Today     Code Description Service Date Service Provider Modifiers Qty    42540372360  OT THERAPEUTIC ACT EA 15 MIN 8/27/2021 Bailee Casillas OTR GO 4    [id number]  OT HOT OR COLD PACK TREAT MCARE 8/27/2021 Bailee Casillas OTR GO 1                    KENNETH Chavez  8/27/2021

## 2021-08-30 ENCOUNTER — HOSPITAL ENCOUNTER (OUTPATIENT)
Dept: OCCUPATIONAL THERAPY | Facility: HOSPITAL | Age: 71
Setting detail: THERAPIES SERIES
Discharge: HOME | End: 2021-08-30

## 2021-08-30 DIAGNOSIS — S52.532D CLOSED COLLES' FRACTURE OF LEFT RADIUS WITH ROUTINE HEALING, SUBSEQUENT ENCOUNTER: Primary | ICD-10-CM

## 2021-08-30 PROCEDURE — 97530 THERAPEUTIC ACTIVITIES: CPT

## 2021-08-30 NOTE — THERAPY TREATMENT NOTE
"Outpatient Occupational Therapy Ortho Treatment Note   [place]     Patient Name: Joan Block  : 1950  MRN: [id number]  Today's Date: 2021        Visit Date: 2021    Patient Active Problem List   Diagnosis   • Distal radial fracture   • Closed fracture of left distal radius   • Fracture, Colles, left, closed        Past Medical History:   Diagnosis Date   • Arthritis of back 8 yrs ago   • Fracture of wrist 2021   • Fracture, radius 2021   • Fracture, ulna 2021   • Hyperlipidemia    • Hypertension    • Tendinitis of knee         Past Surgical History:   Procedure Laterality Date   • APPENDECTOMY     • FOOT SURGERY     • KNEE SURGERY     • ORIF ULNA/RADIUS FRACTURES Left 6/15/2021    Procedure: ULNA/RADIUS OPEN REDUCTION INTERNAL FIXATION;  Surgeon: Norman Brown MD;  Location: [facility];  Service: Orthopedics;  Laterality: Left;  ULNA/RADIAL OPEN REDUCTION INTERNAL FIXATION   • WRIST SURGERY           Visit Dx:    ICD-10-CM ICD-9-CM   1. Closed Colles' fracture of left radius with routine healing, subsequent encounter  S52.532D V54.12       OT Ortho     Row Name 21 1200             Subjective Pain    Able to rate subjective pain?  yes  -EN      Pre-Treatment Pain Level  0  -EN      Post-Treatment Pain Level  0  -EN      Subjective Pain Comment  Pain only \"when I push on it..\"  -EN         Left Upper Ext    Lt Elbow Extension/Flexion AROM  -33  -EN      Lt Elbow Pronation AROM  41  -EN      Lt Wrist Flexion AROM  24  -EN      Lt Wrist Extension AROM  35  -EN        User Key  (r) = Recorded By, (t) = Taken By, (c) = Cosigned By    Initials Name Provider Type    Bailee Estrada OTR Occupational Therapist                       OT Assessment/Plan     Row Name 21 1248          OT Assessment    Functional Limitations  Performance in leisure activities;Performance in self-care ADL;Performance in work activities;Limitation in home management  -EN     " Impairments  Edema;Muscle strength;Pain;Range of motion  -EN     Assessment Comments  Improved elbow extension and slight increase in pronation. Patient reports she mainly worked on her fingers and elbow this weekend (loss in wrist ROM). Patient encouraged to work on elbow, forearm, wrist and hand at least 3 times per day.  -EN        OT Plan    OT Plan Comments  Continue with goals.  -EN       User Key  (r) = Recorded By, (t) = Taken By, (c) = Cosigned By    Initials Name Provider Type    Bailee Estrada OTR Occupational Therapist           OT Goals     Row Name 08/30/21 1200          OT Short Term Goals    STG Date to Achieve  09/03/21  -EN     STG 1  Patient will demonstrate independence with HEP including ROM, edema management and scar care.  -EN     STG 1 Progress  Met  -EN     STG 2  Patient will improve BAPTISTE in left digits by 25 degrees for improved grasp.  -EN     STG 2 Progress  Met  -EN     STG 3  Patient will improve active L wrist flexion and extension by 15 degress.  -EN     STG 3 Progress  Met  -EN     STG 4  Patient will improve L forearm active pronation by 20 degrees.  -EN     STG 4 Progress  Met  -EN     STG 5  Patient will report 50% less pain during activity.  -EN     STG 5 Progress  Met  -EN     STG 6  Patient will improve left elbow AROM to WFL.  -EN     STG 6 Progress  Ongoing;Progressing  -EN        Long Term Goals    LTG Date to Achieve  10/01/21  -EN     LTG 1  Patient will demonstrate WFL left hand/wrist/forearm/elbow  AROM.  -EN     LTG 1 Progress  Ongoing  -EN     LTG 2  Patient will demonstrate WFL strength in L UE.  -EN     LTG 2 Progress  Ongoing  -EN     LTG 3  Patient will report pain 1/10 or less during activity.  -EN     LTG 3 Progress  Ongoing  -EN     LTG 4  Patient will report independence with all ADL/IADLs.  -EN     LTG 4 Progress  Ongoing  -EN       User Key  (r) = Recorded By, (t) = Taken By, (c) = Cosigned By    Initials Name Provider Type    Bailee Estrada  KENNETH BROWN Occupational Therapist            OT Exercises     Row Name 08/30/21 1200             Exercise 1    Exercise Name 1  retrograde edema massage- left elbow/forearm/wrist/hand  -EN         Exercise 2    Exercise Name 2  ROM exercises, elbow, forearm, wrist and hand  -EN         Exercise 3    Exercise Name 3  FMC activities  -EN        User Key  (r) = Recorded By, (t) = Taken By, (c) = Cosigned By    Initials Name Provider Type    Bailee Estrada OTR Occupational Therapist                        Time Calculation:   OT Start Time: 1045  OT Stop Time: 1200  OT Time Calculation (min): 75 min  Timed Charges  77394 - OT Therapeutic Activity Minutes: 60  Total Minutes  Timed Charges Total Minutes: 60   Total Minutes: 60     Therapy Charges for Today     Code Description Service Date Service Provider Modifiers Qty    97433103891 HC OT THERAPEUTIC ACT EA 15 MIN 8/30/2021 Bailee Casillas OTR GO 4    [id number] HC OT HOT OR COLD PACK TREAT MCARE 8/30/2021 Bailee Casillas OTR GO 1                    KENNETH Chavez  8/30/2021

## 2021-09-01 ENCOUNTER — HOSPITAL ENCOUNTER (OUTPATIENT)
Dept: OCCUPATIONAL THERAPY | Facility: HOSPITAL | Age: 71
Setting detail: THERAPIES SERIES
Discharge: HOME | End: 2021-09-01

## 2021-09-01 DIAGNOSIS — S52.532D CLOSED COLLES' FRACTURE OF LEFT RADIUS WITH ROUTINE HEALING, SUBSEQUENT ENCOUNTER: Primary | ICD-10-CM

## 2021-09-01 PROCEDURE — 97530 THERAPEUTIC ACTIVITIES: CPT

## 2021-09-01 NOTE — THERAPY TREATMENT NOTE
Outpatient Occupational Therapy Ortho Treatment Note  [place]     Patient Name: Joan Block  : 1950  MRN: [id number]  Today's Date: 2021        Visit Date: 2021    Patient Active Problem List   Diagnosis   • Distal radial fracture   • Closed fracture of left distal radius   • Fracture, Colles, left, closed        Past Medical History:   Diagnosis Date   • Arthritis of back 8 yrs ago   • Fracture of wrist 2021   • Fracture, radius 2021   • Fracture, ulna 2021   • Hyperlipidemia    • Hypertension    • Tendinitis of knee         Past Surgical History:   Procedure Laterality Date   • APPENDECTOMY     • FOOT SURGERY     • KNEE SURGERY     • ORIF ULNA/RADIUS FRACTURES Left 6/15/2021    Procedure: ULNA/RADIUS OPEN REDUCTION INTERNAL FIXATION;  Surgeon: Norman Brown MD;  Location: [facility];  Service: Orthopedics;  Laterality: Left;  ULNA/RADIAL OPEN REDUCTION INTERNAL FIXATION   • WRIST SURGERY           Visit Dx:    ICD-10-CM ICD-9-CM   1. Closed Colles' fracture of left radius with routine healing, subsequent encounter  S52.532D V54.12       OT Ortho     Row Name 21 1200             Subjective Pain    Able to rate subjective pain?  yes  -EN      Pre-Treatment Pain Level  0  -EN      Post-Treatment Pain Level  0  -EN         Left Upper Ext    Lt Elbow Pronation AROM  45  -EN        User Key  (r) = Recorded By, (t) = Taken By, (c) = Cosigned By    Initials Name Provider Type    EN Bailee Casillas, OTR Occupational Therapist                  Therapy Education  Given: HEP, Symptoms/condition management, Edema management  Program: Reinforced, Progressed  How Provided: Verbal, Demonstration  Provided to: Patient  Level of Understanding: Teach back education performed, Verbalized, Demonstrated    OT Assessment/Plan     Row Name 21 1258          OT Assessment    Functional Limitations  Performance in leisure activities;Performance in self-care  ADL;Performance in work activities;Limitation in home management  -EN     Impairments  Edema;Muscle strength;Pain;Range of motion  -EN     Assessment Comments  Patient demonstrated improved pronation. Reports pain 0/10 at rest but increases during PROM. Patient using pincer grasp more to  small objects. Reinfoced HEP and edema management.  -EN        OT Plan    OT Plan Comments  Continue with goals.  -EN       User Key  (r) = Recorded By, (t) = Taken By, (c) = Cosigned By    Initials Name Provider Type    Bailee Estrada OTR Occupational Therapist           OT Goals     Row Name 09/01/21 1200          OT Short Term Goals    STG Date to Achieve  09/03/21  -EN     STG 1  Patient will demonstrate independence with HEP including ROM, edema management and scar care.  -EN     STG 1 Progress  Met  -EN     STG 2  Patient will improve BAPTISTE in left digits by 25 degrees for improved grasp.  -EN     STG 2 Progress  Met  -EN     STG 3  Patient will improve active L wrist flexion and extension by 15 degress.  -EN     STG 3 Progress  Met  -EN     STG 4  Patient will improve L forearm active pronation by 20 degrees.  -EN     STG 4 Progress  Met  -EN     STG 5  Patient will report 50% less pain during activity.  -EN     STG 5 Progress  Met  -EN     STG 6  Patient will improve left elbow AROM to WFL.  -EN     STG 6 Progress  Ongoing;Progressing  -EN        Long Term Goals    LTG Date to Achieve  10/01/21  -EN     LTG 1  Patient will demonstrate WFL left hand/wrist/forearm/elbow  AROM.  -EN     LTG 1 Progress  Ongoing  -EN     LTG 2  Patient will demonstrate WFL strength in L UE.  -EN     LTG 2 Progress  Ongoing  -EN     LTG 3  Patient will report pain 1/10 or less during activity.  -EN     LTG 3 Progress  Ongoing  -EN     LTG 4  Patient will report independence with all ADL/IADLs.  -EN     LTG 4 Progress  Ongoing  -EN       User Key  (r) = Recorded By, (t) = Taken By, (c) = Cosigned By    Initials Name Provider Type     Bailee Estrada OTR Occupational Therapist          Modalities     Row Name 09/01/21 1100             Moist Heat    MH Applied  Yes  -EN      Location  left wrist/hand  -EN      OT Moist Heat Minutes  15  -EN      MH Prior to Rx  Yes  -EN        User Key  (r) = Recorded By, (t) = Taken By, (c) = Cosigned By    Initials Name Provider Type    EN Bailee Casillas OTR Occupational Therapist        OT Exercises     Row Name 09/01/21 1300             Exercise 1    Exercise Name 1  retrograde edema massage- left elbow/forearm/wrist/hand  -EN         Exercise 2    Exercise Name 2  ROM exercises, elbow, forearm, wrist and hand  -EN         Exercise 3    Exercise Name 3  [facility] activities- picked up 9 hole pegs and placed in pegboard  -EN         Exercise 4    Exercise Name 4  supination/pronation wheel  -EN        User Key  (r) = Recorded By, (t) = Taken By, (c) = Cosigned By    Initials Name Provider Type    EN Bailee Casillas OTR Occupational Therapist                        Time Calculation:   OT Start Time: 1050  OT Stop Time: 1208  OT Time Calculation (min): 78 min  Timed Charges  03248 - OT Therapeutic Activity Minutes: 60  Untimed Charges  OT Moist Heat Minutes: 15  Total Minutes  Timed Charges Total Minutes: 60  Untimed Charges Total Minutes: 15   Total Minutes: 60     Therapy Charges for Today     Code Description Service Date Service Provider Modifiers Qty    30927229921 HC OT THERAPEUTIC ACT EA 15 MIN 9/1/2021 Bailee Casillas OTR GO 4    53284647581 HC OT HOT OR COLD PACK TREAT MCARE 9/1/2021 Bailee Casillas OTR GO 1                    KENNETH Chavez  9/1/2021

## 2021-09-03 ENCOUNTER — HOSPITAL ENCOUNTER (OUTPATIENT)
Dept: OCCUPATIONAL THERAPY | Facility: HOSPITAL | Age: 71
Setting detail: THERAPIES SERIES
Discharge: HOME | End: 2021-09-03

## 2021-09-03 DIAGNOSIS — S52.532D CLOSED COLLES' FRACTURE OF LEFT RADIUS WITH ROUTINE HEALING, SUBSEQUENT ENCOUNTER: Primary | ICD-10-CM

## 2021-09-03 PROCEDURE — 97530 THERAPEUTIC ACTIVITIES: CPT

## 2021-09-03 NOTE — THERAPY PROGRESS REPORT/RE-CERT
"Outpatient Occupational Therapy Ortho Progress Note   [place]     Patient Name: Joan Block  : 1950  MRN: [id number]  Today's Date: 9/3/2021        Visit Date: 2021    Patient Active Problem List   Diagnosis   • Distal radial fracture   • Closed fracture of left distal radius   • Fracture, Colles, left, closed        Past Medical History:   Diagnosis Date   • Arthritis of back 8 yrs ago   • Fracture of wrist 2021   • Fracture, radius 2021   • Fracture, ulna 2021   • Hyperlipidemia    • Hypertension    • Tendinitis of knee         Past Surgical History:   Procedure Laterality Date   • APPENDECTOMY     • FOOT SURGERY     • KNEE SURGERY     • ORIF ULNA/RADIUS FRACTURES Left 6/15/2021    Procedure: ULNA/RADIUS OPEN REDUCTION INTERNAL FIXATION;  Surgeon: Norman Brown MD;  Location: [facility];  Service: Orthopedics;  Laterality: Left;  ULNA/RADIAL OPEN REDUCTION INTERNAL FIXATION   • WRIST SURGERY           Visit Dx:    ICD-10-CM ICD-9-CM   1. Closed Colles' fracture of left radius with routine healing, subsequent encounter  S52.532D V54.12       OT Ortho     Row Name 21 1400             Subjective Pain    Able to rate subjective pain?  yes  -EN      Pre-Treatment Pain Level  -- \"half a point\"  -EN      Post-Treatment Pain Level  0  -EN         Left Upper Ext    Lt Elbow Extension/Flexion AROM  -33  -EN      Lt Elbow Pronation AROM  45  -EN      Lt Wrist Flexion AROM  25  -EN      Lt Wrist Flexion PROM  --  -EN      Lt Wrist Extension AROM  36  -EN        User Key  (r) = Recorded By, (t) = Taken By, (c) = Cosigned By    Initials Name Provider Type    EN Bailee Casillas OTR Occupational Therapist             Hand Therapy (last 24 hours)      Hand Eval     Row Name 21 1400             Left Extension AROM    II- MP AROM  -11  -EN      II- PIP AROM  -25  -EN      II- DIP AROM  -8  -EN      II- BAPTISTE Left Extension AROM  -44  -EN      III- MP AROM  -16  -EN   "    III- PIP AROM  -44  -EN      III- DIP AROM  -26  -EN      III- BAPTISTE Left Extension AROM  -86  -EN      IV- MP AROM  -24  -EN      IV- PIP AROM  -44  -EN      IV- DIP AROM  -25  -EN      IV- BAPTISTE Left Extension AROM  -93  -EN      V- MP AROM  -11  -EN      V- PIP AROM  -34  -EN      V- DIP AROM  -25  -EN      V- BAPTISTE Left Extension AROM  -70  -EN         Left Flexion AROM    II- MP AROM  41  -EN      II- PIP AROM  40  -EN      II- DIP AROM  30  -EN      II- BAPTISTE Left Flexion AROM  111  -EN      III- MP AROM  40  -EN      III- PIP AROM  80  -EN      III- DIP AROM  55  -EN      III- BAPTISTE Left Flexion AROM  175  -EN      IV- MP AROM  53  -EN      IV- PIP AROM  80  -EN      IV- DIP AROM  52  -EN      IV- BAPTISTE Left Flexion AROM  185  -EN      V- MP AROM  35  -EN      V- PIP AROM  63  -EN      V- DIP AROM  50  -EN      V- BAPTISTE Left Flexion AROM  148  -EN         Left Thumb AROM    MP Flexion - AROM  41  -EN      MP Extension- AROM  -15  -EN      IP Flexion - AROM  25  -EN         Therapy Education    Given  HEP;Symptoms/condition management;Edema management  -EN      Program  Reinforced;Progressed  -EN      How Provided  Verbal;Demonstration  -EN      Provided to  Patient  -EN      Level of Understanding  Teach back education performed;Verbalized;Demonstrated  -EN        User Key  (r) = Recorded By, (t) = Taken By, (c) = Cosigned By    Initials Name Provider Type    Bailee Estrada, OTR Occupational Therapist              Therapy Education  Given: HEP, Symptoms/condition management, Edema management  Program: Reinforced, Progressed  How Provided: Verbal, Demonstration  Provided to: Patient  Level of Understanding: Teach back education performed, Verbalized, Demonstrated    OT Assessment/Plan     Row Name 09/03/21 1502          OT Assessment    Functional Limitations  Performance in leisure activities;Performance in self-care ADL;Performance in work activities;Limitation in home management  -EN     Impairments   Edema;Muscle strength;Pain;Range of motion  -EN     Assessment Comments  Patient is continuing to slowly progress. BAPTISTE of digits with initial measurements and current as follows:  Digit 2 improved from 24 degrees sto 67 degrees, digit 3 improved froom 50 degrees to 89 degrees, digit 4 improved from 47 degrees to 92 degrees and digit 5 improved from 46 degrees to 78 degrees. Digit one was unable to oppose any fingers and is now opposing digits 2, 3, and 4. Thumb MP flexion initially was 35 degrees and is currently 41 degrees and IP flexion was 12 degrees and is now 24 degrees. Wrist flexion improved from 6 degrees to 25 degrees and extension improved from 0 to 36 degrees. Forearm pronation has improved from -3 to 45 degrees and elbow AROM has improved from - to -. Patient's pain level has also improved and is 0/10 on average at rest, increases up to 5-6 with certain movements/passive stretch. Patient will benefit from continue OT to address these ROM/strength deficits as well as edema management.  -EN     OT Diagnosis  decresased L UE function, decreased ADL/IADL ind, pain  -EN     OT Rehab Potential  Good  -EN     Patient/caregiver participated in establishment of treatment plan and goals  Yes  -EN     Patient would benefit from skilled therapy intervention  Yes  -EN        OT Plan    OT Frequency  3x/week  -EN     Predicted Duration of Therapy Intervention (OT)  4 weeks  -EN     OT Plan Comments  Continue OT 3 X per week X 4 weeks.  -EN       User Key  (r) = Recorded By, (t) = Taken By, (c) = Cosigned By    Initials Name Provider Type    Bailee Estrada OTR Occupational Therapist           OT Goals     Row Name 09/03/21 0845          OT Short Term Goals    STG Date to Achieve  09/17/21  -EN     STG 1  Patient will demonstrate independence with HEP including ROM, edema management and scar care.  -EN     STG 1 Progress  Met  -EN     STG 2  Patient will improve BAPTISTE in left digits by 25 degrees for  improved grasp.  -EN     STG 2 Progress  Met  -EN     STG 3  Patient will improve active L wrist flexion and extension by 15 degress.  -EN     STG 3 Progress  Met  -EN     STG 4  Patient will improve L forearm active pronation by 20 degrees.  -EN     STG 4 Progress  Met  -EN     STG 5  Patient will report 50% less pain during activity.  -EN     STG 5 Progress  Met  -EN     STG 6  Patient will improve left elbow AROM to WFL.  -EN     STG 6 Progress  Ongoing;Progressing  -EN        Long Term Goals    LTG Date to Achieve  10/01/21  -EN     LTG 1  Patient will demonstrate WFL left hand/wrist/forearm/elbow  AROM.  -EN     LTG 1 Progress  Ongoing  -EN     LTG 2  Patient will demonstrate WFL strength in L UE.  -EN     LTG 2 Progress  Ongoing  -EN     LTG 3  Patient will report pain 1/10 or less during activity.  -EN     LTG 3 Progress  Ongoing  -EN     LTG 4  Patient will report independence with all ADL/IADLs.  -EN     LTG 4 Progress  Ongoing  -EN       User Key  (r) = Recorded By, (t) = Taken By, (c) = Cosigned By    Initials Name Provider Type    EN Bailee Casillas OTR Occupational Therapist          Modalities     Row Name 09/03/21 0845             Moist Heat    MH Applied  Yes  -EN      Location  left wrist/hand  -EN      OT Moist Heat Minutes  15  -EN      MH Prior to Rx  Yes  -EN        User Key  (r) = Recorded By, (t) = Taken By, (c) = Cosigned By    Initials Name Provider Type    Bailee Estrada OTR Occupational Therapist        OT Exercises     Row Name 09/03/21 1500             Exercise 1    Exercise Name 1  retrograde edema massage- left elbow/forearm/wrist/hand  -EN         Exercise 2    Exercise Name 2  ROM exercises, elbow, forearm, wrist and hand  -EN         Exercise 3    Exercise Name 3  [facility] activities- picked up 9 hole pegs and placed in pegboard  -EN        User Key  (r) = Recorded By, (t) = Taken By, (c) = Cosigned By    Initials Name Provider Type    EN Bailee Casillas OTR  Occupational Therapist                        Time Calculation:   OT Start Time: 0852  OT Stop Time: 1008  OT Time Calculation (min): 76 min  Timed Charges  59784 - OT Therapeutic Activity Minutes: 60  Untimed Charges  OT Moist Heat Minutes: 15  Total Minutes  Timed Charges Total Minutes: 60  Untimed Charges Total Minutes: 15   Total Minutes: 60     Therapy Charges for Today     Code Description Service Date Service Provider Modifiers Qty    31046178907 HC OT THERAPEUTIC ACT EA 15 MIN 9/3/2021 Bailee Casillas OTR GO 4    07056628401 HC OT HOT OR COLD PACK TREAT MCARE 9/3/2021 Bailee Casillas OTR GO 1                    KENNEHT Chavez  9/3/2021

## 2021-09-07 ENCOUNTER — HOSPITAL ENCOUNTER (OUTPATIENT)
Dept: OCCUPATIONAL THERAPY | Facility: HOSPITAL | Age: 71
Setting detail: THERAPIES SERIES
Discharge: HOME | End: 2021-09-07

## 2021-09-07 DIAGNOSIS — S52.532D CLOSED COLLES' FRACTURE OF LEFT RADIUS WITH ROUTINE HEALING, SUBSEQUENT ENCOUNTER: Primary | ICD-10-CM

## 2021-09-07 PROCEDURE — 97530 THERAPEUTIC ACTIVITIES: CPT

## 2021-09-07 NOTE — THERAPY TREATMENT NOTE
"Outpatient Occupational Therapy Ortho Treatment Note   [facility]     Patient Name: Joan Block  : 1950  MRN: [id number]  Today's Date: 2021        Visit Date: 2021    Patient Active Problem List   Diagnosis   • Distal radial fracture   • Closed fracture of left distal radius   • Fracture, Colles, left, closed        Past Medical History:   Diagnosis Date   • Arthritis of back 8 yrs ago   • Fracture of wrist 2021   • Fracture, radius 2021   • Fracture, ulna 2021   • Hyperlipidemia    • Hypertension    • Tendinitis of knee         Past Surgical History:   Procedure Laterality Date   • APPENDECTOMY     • FOOT SURGERY     • KNEE SURGERY     • ORIF ULNA/RADIUS FRACTURES Left 6/15/2021    Procedure: ULNA/RADIUS OPEN REDUCTION INTERNAL FIXATION;  Surgeon: Norman Brown MD;  Location: [facility];  Service: Orthopedics;  Laterality: Left;  ULNA/RADIAL OPEN REDUCTION INTERNAL FIXATION   • WRIST SURGERY           Visit Dx:    ICD-10-CM ICD-9-CM   1. Closed Colles' fracture of left radius with routine healing, subsequent encounter  S52.532D V54.12       OT Ortho     Row Name 21 1000             Subjective Pain    Able to rate subjective pain?  yes  -EN      Pre-Treatment Pain Level  -- pt reported she forgot to take her pain meds today  -EN      Post-Treatment Pain Level  3  -EN      Subjective Pain Comment  pt stated, \"I forgot to take my medicine...I'm hurting.\"  -EN         Posture/Observations    Posture/Observations Comments  Patient continues to hold forearm in full supination with elbow flexed. Moderate edema continues throughout L UE.   -EN         Left Upper Ext    Lt Wrist Extension AROM  35  -EN        User Key  (r) = Recorded By, (t) = Taken By, (c) = Cosigned By    Initials Name Provider Type    EN Bailee Casillas OTR Occupational Therapist             Hand Therapy (last 24 hours)      Hand Eval     Row Name 21 1400             Therapy Education "    Education Details  Reinforced HEP including edema management. Patient reported she has a TENs unit, encouraged patient to wear it   -EN      Given  HEP;Symptoms/condition management;Edema management  -EN      Program  Reinforced;Progressed  -EN      How Provided  Verbal;Demonstration  -EN      Provided to  Patient  -EN      Level of Understanding  Teach back education performed;Verbalized;Demonstrated  -EN        User Key  (r) = Recorded By, (t) = Taken By, (c) = Cosigned By    Initials Name Provider Type    Bailee Estrada OTABRAHAM Occupational Therapist              Therapy Education  Education Details: Reinforced HEP including edema management. Patient reported she has a TENs unit, encouraged patient to wear it   Given: HEP, Symptoms/condition management, Edema management  Program: Reinforced, Progressed  How Provided: Verbal, Demonstration  Provided to: Patient  Level of Understanding: Teach back education performed, Verbalized, Demonstrated    OT Assessment/Plan     Row Name 09/07/21 1418          OT Assessment    Functional Limitations  Performance in leisure activities;Performance in self-care ADL;Performance in work activities;Limitation in home management  -EN     Impairments  Edema;Muscle strength;Pain;Range of motion  -EN     Assessment Comments  Patient reports increased pain today secondary to forgetting to take pain medicine. Patient continues to have moderate edema throughout the L UE, reinforced edema management and HEP. No significant changes with ROM.  -EN        OT Plan    OT Plan Comments  Continue with goals.  -EN       User Key  (r) = Recorded By, (t) = Taken By, (c) = Cosigned By    Initials Name Provider Type    Bailee Estrada OTR Occupational Therapist           OT Goals     Row Name 09/07/21 1400          OT Short Term Goals    STG Date to Achieve  09/17/21  -EN     STG 1  Patient will demonstrate independence with HEP including ROM, edema management and scar care.  -EN      STG 1 Progress  Met  -EN     STG 2  Patient will improve BAPTISTE in left digits by 25 degrees for improved grasp.  -EN     STG 2 Progress  Met  -EN     STG 3  Patient will improve active L wrist flexion and extension by 15 degress.  -EN     STG 3 Progress  Met  -EN     STG 4  Patient will improve L forearm active pronation by 20 degrees.  -EN     STG 4 Progress  Met  -EN     STG 5  Patient will report 50% less pain during activity.  -EN     STG 5 Progress  Met  -EN     STG 6  Patient will improve left elbow AROM to WFL.  -EN     STG 6 Progress  Ongoing;Progressing  -EN        Long Term Goals    LTG Date to Achieve  10/01/21  -EN     LTG 1  Patient will demonstrate WFL left hand/wrist/forearm/elbow  AROM.  -EN     LTG 1 Progress  Ongoing  -EN     LTG 2  Patient will demonstrate WFL strength in L UE.  -EN     LTG 2 Progress  Ongoing  -EN     LTG 3  Patient will report pain 1/10 or less during activity.  -EN     LTG 3 Progress  Ongoing  -EN     LTG 4  Patient will report independence with all ADL/IADLs.  -EN     LTG 4 Progress  Ongoing  -EN       User Key  (r) = Recorded By, (t) = Taken By, (c) = Cosigned By    Initials Name Provider Type    EN Bailee Casillas OTR Occupational Therapist          Modalities     Row Name 09/07/21 0900             Moist Heat    MH Applied  Yes  -EN      Location  left wrist/hand  -EN      OT Moist Heat Minutes  15  -EN      MH Prior to Rx  Yes  -EN        User Key  (r) = Recorded By, (t) = Taken By, (c) = Cosigned By    Initials Name Provider Type    Bailee Estrada OTR Occupational Therapist        OT Exercises     Row Name 09/07/21 1000             Exercise 1    Exercise Name 1  retrograde edema massage- left elbow/forearm/wrist/hand  -EN         Exercise 2    Exercise Name 2  ROM exercises, elbow, forearm, wrist and hand  -EN         Exercise 3    Exercise Name 3  [facility] activities- picked up 9 hole pegs and placed in pegboard  -EN         Exercise 4    Exercise Name 4   supination/pronation wheel  -EN         Exercise 5    Exercise Name 5  hand strengthening- yellow digiflex  -EN      Cueing 5  Verbal;Tactile  -EN      Resistance 5  Yellow  -EN      Sets 5  1  -EN      Reps 5  10  -EN      Time (Seconds) 5  3  -EN        User Key  (r) = Recorded By, (t) = Taken By, (c) = Cosigned By    Initials Name Provider Type    Bailee Estrada OTR Occupational Therapist                        Time Calculation:   OT Start Time: 1045  OT Stop Time: 1156  OT Time Calculation (min): 71 min  Timed Charges  98083 - OT Therapeutic Activity Minutes: 60  Untimed Charges  OT Moist Heat Minutes: 15  Total Minutes  Timed Charges Total Minutes: 60  Untimed Charges Total Minutes: 15   Total Minutes: 60     Therapy Charges for Today     Code Description Service Date Service Provider Modifiers Qty    80627091103 HC OT THERAPEUTIC ACT EA 15 MIN 9/7/2021 Bailee Casillas OTR GO 4    28870557501 HC OT HOT OR COLD PACK TREAT MCARE 9/7/2021 Bailee Casillas OTR GO 1                    KENNETH Chavez  9/7/2021

## 2021-09-08 ENCOUNTER — HOSPITAL ENCOUNTER (OUTPATIENT)
Dept: OCCUPATIONAL THERAPY | Facility: HOSPITAL | Age: 71
Setting detail: THERAPIES SERIES
Discharge: HOME | End: 2021-09-08

## 2021-09-08 DIAGNOSIS — S52.532D CLOSED COLLES' FRACTURE OF LEFT RADIUS WITH ROUTINE HEALING, SUBSEQUENT ENCOUNTER: Primary | ICD-10-CM

## 2021-09-08 PROCEDURE — 97530 THERAPEUTIC ACTIVITIES: CPT

## 2021-09-08 NOTE — THERAPY TREATMENT NOTE
"Outpatient Occupational Therapy Ortho Treatment Note   [place]     Patient Name: Joan Block  : 1950  MRN: [id number]  Today's Date: 2021        Visit Date: 2021    Patient Active Problem List   Diagnosis   • Distal radial fracture   • Closed fracture of left distal radius   • Fracture, Colles, left, closed        Past Medical History:   Diagnosis Date   • Arthritis of back 8 yrs ago   • Fracture of wrist 2021   • Fracture, radius 2021   • Fracture, ulna 2021   • Hyperlipidemia    • Hypertension    • Tendinitis of knee         Past Surgical History:   Procedure Laterality Date   • APPENDECTOMY     • FOOT SURGERY     • KNEE SURGERY     • ORIF ULNA/RADIUS FRACTURES Left 6/15/2021    Procedure: ULNA/RADIUS OPEN REDUCTION INTERNAL FIXATION;  Surgeon: Norman Brown MD;  Location: [facility];  Service: Orthopedics;  Laterality: Left;  ULNA/RADIAL OPEN REDUCTION INTERNAL FIXATION   • WRIST SURGERY           Visit Dx:    ICD-10-CM ICD-9-CM   1. Closed Colles' fracture of left radius with routine healing, subsequent encounter  S52.532D V54.12       OT Ortho     Row Name 21 1200             Subjective Pain    Able to rate subjective pain?  yes  -EN      Pre-Treatment Pain Level  0  -EN      Post-Treatment Pain Level  0  -EN      Subjective Pain Comment  \"I took my medicine today!\"  -EN         Left Upper Ext    Lt Elbow Extension/Flexion AROM  -33  -EN      Lt Wrist Flexion AROM  22  -EN      Lt Wrist Extension AROM  36  -EN        User Key  (r) = Recorded By, (t) = Taken By, (c) = Cosigned By    Initials Name Provider Type    Bailee Estrada OTR Occupational Therapist                  Therapy Education  Given: HEP, Symptoms/condition management, Edema management  Program: Reinforced, Progressed  How Provided: Verbal, Demonstration  Provided to: Patient  Level of Understanding: Teach back education performed, Verbalized, Demonstrated    OT Assessment/Plan  "    Row Name 09/08/21 1253          OT Assessment    Functional Limitations  Performance in leisure activities;Performance in self-care ADL;Performance in work activities;Limitation in home management  -EN     Impairments  Edema;Muscle strength;Pain;Range of motion  -EN     Assessment Comments  Patient with less pain today, appears to have improved finger ROM. Patient grasped and released objects of various sizes such as pill bottles, shampoo bottles and was able to complete the 9 Hole Peg assessment. No significant changes with wrist/forearm/elbow AROM.  -EN        OT Plan    OT Plan Comments  Continue with goals.  -EN       User Key  (r) = Recorded By, (t) = Taken By, (c) = Cosigned By    Initials Name Provider Type    Bailee Estrada OTR Occupational Therapist           OT Goals     Row Name 09/08/21 1200          OT Short Term Goals    STG Date to Achieve  09/17/21  -EN     STG 1  Patient will demonstrate independence with HEP including ROM, edema management and scar care.  -EN     STG 1 Progress  Met  -EN     STG 2  Patient will improve BAPTISTE in left digits by 25 degrees for improved grasp.  -EN     STG 2 Progress  Met  -EN     STG 3  Patient will improve active L wrist flexion and extension by 15 degress.  -EN     STG 3 Progress  Met  -EN     STG 4  Patient will improve L forearm active pronation by 20 degrees.  -EN     STG 4 Progress  Met  -EN     STG 5  Patient will report 50% less pain during activity.  -EN     STG 5 Progress  Met  -EN     STG 6  Patient will improve left elbow AROM to WFL.  -EN     STG 6 Progress  Ongoing;Progressing  -EN        Long Term Goals    LTG Date to Achieve  10/01/21  -EN     LTG 1  Patient will demonstrate WFL left hand/wrist/forearm/elbow  AROM.  -EN     LTG 1 Progress  Ongoing  -EN     LTG 2  Patient will demonstrate WFL strength in L UE.  -EN     LTG 2 Progress  Ongoing  -EN     LTG 3  Patient will report pain 1/10 or less during activity.  -EN     LTG 3 Progress   Ongoing  -EN     LTG 4  Patient will report independence with all ADL/IADLs.  -EN     LTG 4 Progress  Ongoing  -EN       User Key  (r) = Recorded By, (t) = Taken By, (c) = Cosigned By    Initials Name Provider Type    Bailee Estrada OTR Occupational Therapist          Modalities     Row Name 09/08/21 1255             Moist Heat    MH Applied  Yes  -EN      Location  left wrist/hand  -EN      MH Prior to Rx  Yes  -EN        User Key  (r) = Recorded By, (t) = Taken By, (c) = Cosigned By    Initials Name Provider Type    Bailee Estrada OTABRAHAM Occupational Therapist        OT Exercises     Row Name 09/08/21 1200             Exercise 1    Exercise Name 1  retrograde edema massage- left elbow/forearm/wrist/hand  -EN         Exercise 2    Exercise Name 2  ROM exercises, elbow, forearm, wrist and hand  -EN         Exercise 3    Exercise Name 3  FMC activities- picked up 9 hole pegs and placed in pegboard  -EN         Exercise 4    Exercise Name 4  supination/pronation wheel  -EN         Exercise 5    Exercise Name 5  grasp/release pill bottles, shampoo bottles, etc...  -EN      Cueing 5  Verbal  -EN        User Key  (r) = Recorded By, (t) = Taken By, (c) = Cosigned By    Initials Name Provider Type    Bailee Estrada OTR Occupational Therapist            Outcome Measure Options: 9 Hole Peg  9 Hole Peg  9-Hole Peg Left: 97 seconds           Time Calculation:   OT Start Time: 1055  OT Stop Time: 1210  OT Time Calculation (min): 75 min  Timed Charges  71465 - OT Therapeutic Activity Minutes: 60  Untimed Charges  OT Moist Heat Minutes: 15  Total Minutes  Timed Charges Total Minutes: 60  Untimed Charges Total Minutes: 15   Total Minutes: 75     Therapy Charges for Today     Code Description Service Date Service Provider Modifiers Qty    99084232512 HC OT THERAPEUTIC ACT EA 15 MIN 9/8/2021 Bailee Casillas OTR GO 4    13284136418 HC OT HOT OR COLD PACK TREAT MCARE 9/8/2021 Bailee Casillas OTR  GO 1                    Bailee Casillas, OTR  9/8/2021

## 2021-09-10 ENCOUNTER — HOSPITAL ENCOUNTER (OUTPATIENT)
Dept: OCCUPATIONAL THERAPY | Facility: HOSPITAL | Age: 71
Setting detail: THERAPIES SERIES
Discharge: HOME | End: 2021-09-10

## 2021-09-10 DIAGNOSIS — S52.532D CLOSED COLLES' FRACTURE OF LEFT RADIUS WITH ROUTINE HEALING, SUBSEQUENT ENCOUNTER: Primary | ICD-10-CM

## 2021-09-10 PROCEDURE — 97530 THERAPEUTIC ACTIVITIES: CPT

## 2021-09-10 NOTE — THERAPY TREATMENT NOTE
Outpatient Occupational Therapy Ortho Treatment Note  [facility]     Patient Name: Joan Block  : 1950  MRN: [id number]  Today's Date: 9/10/2021        Visit Date: 09/10/2021    Patient Active Problem List   Diagnosis   • Distal radial fracture   • Closed fracture of left distal radius   • Fracture, Colles, left, closed        Past Medical History:   Diagnosis Date   • Arthritis of back 8 yrs ago   • Fracture of wrist 2021   • Fracture, radius 2021   • Fracture, ulna 2021   • Hyperlipidemia    • Hypertension    • Tendinitis of knee         Past Surgical History:   Procedure Laterality Date   • APPENDECTOMY     • FOOT SURGERY     • KNEE SURGERY     • ORIF ULNA/RADIUS FRACTURES Left 6/15/2021    Procedure: ULNA/RADIUS OPEN REDUCTION INTERNAL FIXATION;  Surgeon: Norman Brown MD;  Location: [facility];  Service: Orthopedics;  Laterality: Left;  ULNA/RADIAL OPEN REDUCTION INTERNAL FIXATION   • WRIST SURGERY           Visit Dx:    ICD-10-CM ICD-9-CM   1. Closed Colles' fracture of left radius with routine healing, subsequent encounter  S52.532D V54.12       OT Ortho     Row Name 09/10/21 1100             Subjective Pain    Able to rate subjective pain?  yes  -EN      Pre-Treatment Pain Level  1  -EN      Post-Treatment Pain Level  1  -EN         Left Upper Ext    Lt Elbow Extension/Flexion AROM  -30  -EN      Lt Elbow Pronation AROM  46  -EN      Lt Wrist Flexion AROM  22  -EN      Lt Wrist Extension AROM  36  -EN         LUE Edema - Circumference (cm)    Wrist Crease  16.4 cm  -EN        User Key  (r) = Recorded By, (t) = Taken By, (c) = Cosigned By    Initials Name Provider Type    EN Bailee Casillas, OTR Occupational Therapist             Hand Therapy (last 24 hours)      Hand Eval     Row Name 09/10/21 1100             Left Extension AROM    II- MP AROM  -13  -EN      II- PIP AROM  -26  -EN      II- DIP AROM  -10  -EN      II- BAPTISTE Left Extension AROM  -49  -EN      III-  MP AROM  -16  -EN      III- PIP AROM  -50  -EN      III- DIP AROM  -21  -EN      III- BAPTISTE Left Extension AROM  -87  -EN      IV- MP AROM  -20  -EN      IV- PIP AROM  -40  -EN      IV- DIP AROM  -17  -EN      IV- BAPTISTE Left Extension AROM  -77  -EN      V- MP AROM  -11  -EN      V- PIP AROM  -38  -EN      V- DIP AROM  -24  -EN      V- BAPTISTE Left Extension AROM  -73  -EN         Left Flexion AROM    II- MP AROM  45  -EN      II- PIP AROM  50  -EN      II- DIP AROM  25  -EN      II- BAPTISTE Left Flexion AROM  120  -EN      III- MP AROM  43  -EN      III- PIP AROM  84  -EN      III- DIP AROM  55  -EN      III- BAPTISTE Left Flexion AROM  182  -EN      IV- MP AROM  51  -EN      IV- PIP AROM  80  -EN      IV- DIP AROM  50  -EN      IV- BAPTISTE Left Flexion AROM  181  -EN      V- MP AROM  36  -EN      V- PIP AROM  65  -EN      V- DIP AROM  50  -EN      V- BAPTISTE Left Flexion AROM  151  -EN         Left Thumb AROM    MP Flexion - AROM  41  -EN      MP Extension- AROM  -13  -EN      IP Flexion - AROM  26  -EN         Therapy Education    Given  HEP;Symptoms/condition management;Edema management  -EN      Program  Reinforced;Progressed  -EN      How Provided  Verbal;Demonstration  -EN      Provided to  Patient  -EN      Level of Understanding  Teach back education performed;Verbalized;Demonstrated  -EN        User Key  (r) = Recorded By, (t) = Taken By, (c) = Cosigned By    Initials Name Provider Type    Bailee Estrada, OTR Occupational Therapist              Therapy Education  Given: HEP, Symptoms/condition management, Edema management  Program: Reinforced, Progressed  How Provided: Verbal, Demonstration  Provided to: Patient  Level of Understanding: Teach back education performed, Verbalized, Demonstrated    OT Assessment/Plan     Row Name 09/10/21 1402          OT Assessment    Functional Limitations  Performance in leisure activities;Performance in self-care ADL;Performance in work activities;Limitation in home management  -EN      Impairments  Edema;Muscle strength;Pain;Range of motion  -EN     Assessment Comments  Patient improved BAPTISTE in digits 1-4 as follows (compared to one week ago): Digit 2 improved from 67 to 71 degrees, digit 3 improved from 89 to 95 degrees, and digit 4 improved from 92 to 104 degrees. Wrist AROM has not changed significantly however forearm pronation and elbow extension has improved. Reinforced HEP/edema management.  -EN        OT Plan    OT Plan Comments  Continue with goals.  -EN       User Key  (r) = Recorded By, (t) = Taken By, (c) = Cosigned By    Initials Name Provider Type    Bailee Estrada, OTR Occupational Therapist           OT Goals     Row Name 09/10/21 1100          OT Short Term Goals    STG Date to Achieve  09/17/21  -EN     STG 1  Patient will demonstrate independence with HEP including ROM, edema management and scar care.  -EN     STG 1 Progress  Met  -EN     STG 2  Patient will improve BAPTISTE in left digits by 25 degrees for improved grasp.  -EN     STG 2 Progress  Met  -EN     STG 3  Patient will improve active L wrist flexion and extension by 15 degress.  -EN     STG 3 Progress  Met  -EN     STG 4  Patient will improve L forearm active pronation by 20 degrees.  -EN     STG 4 Progress  Met  -EN     STG 5  Patient will report 50% less pain during activity.  -EN     STG 5 Progress  Met  -EN     STG 6  Patient will improve left elbow AROM to WFL.  -EN     STG 6 Progress  Ongoing;Progressing  -EN        Long Term Goals    LTG Date to Achieve  10/01/21  -EN     LTG 1  Patient will demonstrate WFL left hand/wrist/forearm/elbow  AROM.  -EN     LTG 1 Progress  Ongoing  -EN     LTG 2  Patient will demonstrate WFL strength in L UE.  -EN     LTG 2 Progress  Ongoing  -EN     LTG 3  Patient will report pain 1/10 or less during activity.  -EN     LTG 3 Progress  Ongoing  -EN     LTG 4  Patient will report independence with all ADL/IADLs.  -EN     LTG 4 Progress  Ongoing  -EN       User Key  (r) = Recorded  By, (t) = Taken By, (c) = Cosigned By    Initials Name Provider Type    Bailee Estrada OTABRAHAM Occupational Therapist          Modalities     Row Name 09/10/21 1100             Moist Heat    MH Applied  Yes  -EN      Location  left wrist/hand  -EN      OT Moist Heat Minutes  15  -EN      MH Prior to Rx  Yes  -EN        User Key  (r) = Recorded By, (t) = Taken By, (c) = Cosigned By    Initials Name Provider Type    EN Bailee Casillas OTR Occupational Therapist        OT Exercises     Row Name 09/10/21 1100             Exercise 1    Exercise Name 1  retrograde edema massage- left elbow/forearm/wrist/hand  -EN         Exercise 2    Exercise Name 2  ROM exercises, elbow, forearm, wrist and hand  -EN         Exercise 3    Exercise Name 3  FMC activities-EN                       Exercise 4    Exercise Name 4  grasp/release  -EN      Cueing 4  Verbal  -EN        User Key  (r) = Recorded By, (t) = Taken By, (c) = Cosigned By    Initials Name Provider Type    EN Bailee Casillas OTR Occupational Therapist                        Time Calculation:   OT Start Time: 1050  OT Stop Time: 1205  OT Time Calculation (min): 75 min  Timed Charges  58506 - OT Therapeutic Activity Minutes: 60  Untimed Charges  OT Moist Heat Minutes: 15  Total Minutes  Timed Charges Total Minutes: 60  Untimed Charges Total Minutes: 15   Total Minutes: 60     Therapy Charges for Today     Code Description Service Date Service Provider Modifiers Qty    13468303159 HC OT THERAPEUTIC ACT EA 15 MIN 9/10/2021 Bailee Casillas OTR GO 4    05392185579 HC OT HOT OR COLD PACK TREAT MCARE 9/10/2021 Bailee Casillas OTR GO 1                    KENNETH Chavez  9/10/2021

## 2021-09-13 ENCOUNTER — OFFICE VISIT (OUTPATIENT)
Dept: INTERNAL MEDICINE | Facility: CLINIC | Age: 71
End: 2021-09-13

## 2021-09-13 ENCOUNTER — HOSPITAL ENCOUNTER (OUTPATIENT)
Dept: OCCUPATIONAL THERAPY | Facility: HOSPITAL | Age: 71
Setting detail: THERAPIES SERIES
Discharge: HOME | End: 2021-09-13

## 2021-09-13 VITALS
SYSTOLIC BLOOD PRESSURE: 120 MMHG | HEART RATE: 78 BPM | RESPIRATION RATE: 19 BRPM | DIASTOLIC BLOOD PRESSURE: 78 MMHG | BODY MASS INDEX: 34.28 KG/M2 | OXYGEN SATURATION: 96 % | WEIGHT: 186.3 LBS | HEIGHT: 62 IN | TEMPERATURE: 95.7 F

## 2021-09-13 DIAGNOSIS — I10 ESSENTIAL HYPERTENSION: Primary | Chronic | ICD-10-CM

## 2021-09-13 DIAGNOSIS — E78.2 MIXED HYPERLIPIDEMIA: Chronic | ICD-10-CM

## 2021-09-13 DIAGNOSIS — S52.532D CLOSED COLLES' FRACTURE OF LEFT RADIUS WITH ROUTINE HEALING, SUBSEQUENT ENCOUNTER: Primary | ICD-10-CM

## 2021-09-13 PROCEDURE — 97530 THERAPEUTIC ACTIVITIES: CPT

## 2021-09-13 PROCEDURE — 99214 OFFICE O/P EST MOD 30 MIN: CPT | Performed by: NURSE PRACTITIONER

## 2021-09-13 RX ORDER — LISINOPRIL 10 MG/1
10 TABLET ORAL DAILY
COMMUNITY
Start: 2021-08-27 | End: 2022-01-04 | Stop reason: SDUPTHER

## 2021-09-13 NOTE — THERAPY TREATMENT NOTE
"Outpatient Occupational Therapy Ortho Treatment Note   [facility]     Patient Name: Joan Block  : 1950  MRN: [id number]  Today's Date: 2021        Visit Date: 2021    Patient Active Problem List   Diagnosis   • Distal radial fracture   • Closed fracture of left distal radius   • Fracture, Colles, left, closed   • Essential hypertension   • Mixed hyperlipidemia        Past Medical History:   Diagnosis Date   • Arthritis of back 8 yrs ago   • Fracture of wrist 2021   • Fracture, radius 2021   • Fracture, ulna 2021   • Hyperlipidemia    • Hypertension    • Tendinitis of knee         Past Surgical History:   Procedure Laterality Date   • APPENDECTOMY     • FOOT SURGERY     • KNEE SURGERY     • ORIF ULNA/RADIUS FRACTURES Left 6/15/2021    Procedure: ULNA/RADIUS OPEN REDUCTION INTERNAL FIXATION;  Surgeon: Norman Brown MD;  Location: [facility];  Service: Orthopedics;  Laterality: Left;  ULNA/RADIAL OPEN REDUCTION INTERNAL FIXATION   • WRIST SURGERY           Visit Dx:    ICD-10-CM ICD-9-CM   1. Closed Colles' fracture of left radius with routine healing, subsequent encounter  S52.532D V54.12       OT Ortho     Row Name 21 1100 21 1000          Subjective Pain    Post-Treatment Pain Level  --  0  -EN     Subjective Pain Comment  --  \"I'm using my hand so much more....\"   -EN        Posture/Observations    Posture/Observations Comments  --  decresaed edema in hand and wrist  -EN        Left Upper Ext    Lt Wrist Flexion AROM  --  26  -EN     Lt Wrist Extension AROM  --  36  -EN        LUE Edema - Circumference (cm)    Wrist Crease  15.9 cm  -EN  --       User Key  (r) = Recorded By, (t) = Taken By, (c) = Cosigned By    Initials Name Provider Type    EN Bailee Casillas, OTR Occupational Therapist                  Therapy Education  Given: HEP, Symptoms/condition management, Edema management  Program: Reinforced, Progressed  How Provided: Verbal, " Demonstration  Provided to: Patient  Level of Understanding: Teach back education performed, Verbalized, Demonstrated    OT Assessment/Plan     Row Name 09/13/21 1229          OT Assessment    Functional Limitations  Performance in leisure activities;Performance in self-care ADL;Performance in work activities;Limitation in home management  -EN     Impairments  Edema;Muscle strength;Pain;Range of motion  -EN     Assessment Comments  Patient with significant improvement in functional hand use. Patient able to complete 9 hole peg in 50 seconds (97 seconds last week). Patient able to stack rings and pinch and release resistive pins. Patient with significant decrease in wrist edema and reported no pain at rest.  -EN        OT Plan    OT Plan Comments  Continue with goals.  -EN       User Key  (r) = Recorded By, (t) = Taken By, (c) = Cosigned By    Initials Name Provider Type    Bailee Estrada OTR Occupational Therapist           OT Goals     Row Name 09/13/21 1200          OT Short Term Goals    STG Date to Achieve  09/17/21  -EN     STG 1  Patient will demonstrate independence with HEP including ROM, edema management and scar care.  -EN     STG 1 Progress  Met  -EN     STG 2  Patient will improve BAPTISTE in left digits by 25 degrees for improved grasp.  -EN     STG 2 Progress  Met  -EN     STG 3  Patient will improve active L wrist flexion and extension by 15 degress.  -EN     STG 3 Progress  Met  -EN     STG 4  Patient will improve L forearm active pronation by 20 degrees.  -EN     STG 4 Progress  Met  -EN     STG 5  Patient will report 50% less pain during activity.  -EN     STG 5 Progress  Met  -EN     STG 6  Patient will improve left elbow AROM to WFL.  -EN     STG 6 Progress  Ongoing;Progressing  -EN        Long Term Goals    LTG Date to Achieve  10/01/21  -EN     LTG 1  Patient will demonstrate WFL left hand/wrist/forearm/elbow  AROM.  -EN     LTG 1 Progress  Ongoing  -EN     LTG 2  Patient will demonstrate  WFL strength in L UE.  -EN     LTG 2 Progress  Ongoing  -EN     LTG 3  Patient will report pain 1/10 or less during activity.  -EN     LTG 3 Progress  Ongoing  -EN     LTG 4  Patient will report independence with all ADL/IADLs.  -EN     LTG 4 Progress  Ongoing  -EN       User Key  (r) = Recorded By, (t) = Taken By, (c) = Cosigned By    Initials Name Provider Type    Bailee Estrada OTR Occupational Therapist          Modalities     Row Name 09/13/21 1050             Moist Heat    MH Applied  Yes  -EN      Location  left wrist/hand  -EN      OT Moist Heat Minutes  15  -EN      MH Prior to Rx  Yes  -EN        User Key  (r) = Recorded By, (t) = Taken By, (c) = Cosigned By    Initials Name Provider Type    Bailee Estrada OTR Occupational Therapist        OT Exercises     Row Name 09/13/21 1000             Subjective Pain    Able to rate subjective pain?  yes  -EN      Pre-Treatment Pain Level  0  -EN         Exercise 1    Exercise Name 1  retrograde edema massage- left elbow/forearm/wrist/hand  -EN         Exercise 2    Exercise Name 2  ROM exercises, elbow, forearm, wrist and hand  -EN         Exercise 3    Exercise Name 3  FMC activities- picked up 9 hole pegs and placed in pegboard  -EN         Exercise 4    Exercise Name 4  supination/pronation wheel  -EN         Exercise 5    Exercise Name 5  removed rings and place on dowel independently  -EN         Exercise 6    Exercise Name 6  resistive pins  -EN      Resistance 6  Yellow;Red  -EN        User Key  (r) = Recorded By, (t) = Taken By, (c) = Cosigned By    Initials Name Provider Type    Bailee Estrada OTR Occupational Therapist            9 Hole Peg  9-Hole Peg Right: 50           Time Calculation:   OT Start Time: 1050  OT Stop Time: 1204  OT Time Calculation (min): 74 min  Timed Charges  29069 - OT Therapeutic Activity Minutes: 60  Untimed Charges  OT Moist Heat Minutes: 15  Total Minutes  Timed Charges Total Minutes: 60  Untimed  Charges Total Minutes: 15   Total Minutes: 60     Therapy Charges for Today     Code Description Service Date Service Provider Modifiers Qty    47492654657  OT THERAPEUTIC ACT EA 15 MIN 9/13/2021 Bailee Casillas OTR GO 4    [id number]  OT HOT OR COLD PACK TREAT MCARE 9/13/2021 Bailee Casillas OTR GO 1                    KENNETH Chavez  9/13/2021

## 2021-09-13 NOTE — PROGRESS NOTES
"Subjective    Joan Block is a 71 y.o. female presenting today for   Chief Complaint   Patient presents with   • Establish Care     medicaiton refill   • Hypertension       History of Present Illness     Joan Block presents today as a new patient to me to establish care.   Prior PCP was Gwen hunt/ the [facility]. I do not have previous records.  Patient Care Team:  Josie Del Valle APRN as PCP - General (Family Medicine)  Norman Brown MD as Surgeon (Orthopedic Surgery)  Kassi Rios APRN (Dermatology)    Current/chronic health conditions include:    Patient Active Problem List   Diagnosis   • Distal radial fracture   • Closed fracture of left distal radius   • Fracture, Colles, left, closed   • Essential hypertension   • Mixed hyperlipidemia         Current Outpatient Medications:   •  atorvastatin (LIPITOR) 10 MG tablet, Take 10 mg by mouth Daily., Disp: , Rfl:   •  lisinopril (PRINIVIL,ZESTRIL) 10 MG tablet, Take 10 mg by mouth Daily., Disp: , Rfl:   •  pregabalin (Lyrica) 75 MG capsule, Take 1 capsule by mouth 2 (Two) Times a Day., Disp: 60 capsule, Rfl: 0  No current facility-administered medications for this visit.      Mrs. Block has HTN and HLD. She takes Lisinopril and Lipitor. She tolerates these well. She does not self monitor BP. She denies CP, SOB, cough, HA or dizziness.      The following portions of the patient's history were reviewed and updated as appropriate: allergies, current medications, problem list, past medical history, past surgical history, family history, and social history.         Objective    Vitals:    09/13/21 0840 09/13/21 0924   BP: 140/70 120/78   Pulse: 78    Resp: 19    Temp: 95.7 °F (35.4 °C)    TempSrc: Temporal    SpO2: 96%    Weight: 84.5 kg (186 lb 4.8 oz)    Height: 157.5 cm (62.01\")      Body mass index is 34.07 kg/m².  Nursing notes and vitals reviewed.    Physical Exam  Constitutional:       General: She is not in acute " distress.     Appearance: She is well-developed.   HENT:      Head:        Right Ear: Hearing, tympanic membrane, ear canal and external ear normal.      Left Ear: Hearing, tympanic membrane, ear canal and external ear normal.      Nose: Nose normal.      Mouth/Throat:      Mouth: Mucous membranes are moist.      Pharynx: Oropharynx is clear. Uvula midline.   Neck:      Thyroid: No thyroid mass or thyromegaly.   Cardiovascular:      Rate and Rhythm: Regular rhythm.      Pulses: Normal pulses.      Heart sounds: S1 normal and S2 normal. No murmur heard.   No friction rub. No gallop.    Pulmonary:      Effort: Pulmonary effort is normal.      Breath sounds: Normal breath sounds. No wheezing, rhonchi or rales.   Musculoskeletal:      Cervical back: Neck supple.   Lymphadenopathy:      Cervical: No cervical adenopathy.   Neurological:      Mental Status: She is alert and oriented to person, place, and time.      Cranial Nerves: No cranial nerve deficit.      Sensory: No sensory deficit.   Psychiatric:         Attention and Perception: She is attentive.         Speech: Speech normal.         Behavior: Behavior normal.         No results found for this or any previous visit (from the past 672 hour(s)).      Assessment and Plan    Diagnoses and all orders for this visit:    1. Essential hypertension (Primary)  -     CBC (No Diff); Future  -     Comprehensive Metabolic Panel; Future  -     Hepatitis C Antibody; Future  -     Lipid Panel With / Chol / HDL Ratio; Future  -     Thyroid Cascade Profile; Future  -     Urinalysis With Culture If Indicated - Urine, Clean Catch; Future    2. Mixed hyperlipidemia  -     Lipid Panel With / Chol / HDL Ratio; Future        Except as noted above, pt will continue current medications as noted in the medication list. I will continue to authorize refills as needed.        Medications, including side effects, were discussed with the patient. Patient verbalized understanding.  The plan of  care was discussed. All questions were answered. Patient verbalized understanding.        Return in about 4 weeks (around 10/11/2021) for fasting labs one week prior, Medicare Wellness.

## 2021-09-15 ENCOUNTER — APPOINTMENT (OUTPATIENT)
Dept: MAMMOGRAPHY | Facility: HOSPITAL | Age: 71
End: 2021-09-15

## 2021-09-15 ENCOUNTER — HOSPITAL ENCOUNTER (OUTPATIENT)
Dept: OCCUPATIONAL THERAPY | Facility: HOSPITAL | Age: 71
Setting detail: THERAPIES SERIES
Discharge: HOME | End: 2021-09-15

## 2021-09-15 ENCOUNTER — PATIENT ROUNDING (BHMG ONLY) (OUTPATIENT)
Dept: INTERNAL MEDICINE | Facility: CLINIC | Age: 71
End: 2021-09-15

## 2021-09-15 DIAGNOSIS — S52.532D CLOSED COLLES' FRACTURE OF LEFT RADIUS WITH ROUTINE HEALING, SUBSEQUENT ENCOUNTER: Primary | ICD-10-CM

## 2021-09-15 PROCEDURE — 97530 THERAPEUTIC ACTIVITIES: CPT

## 2021-09-15 NOTE — THERAPY TREATMENT NOTE
Outpatient Occupational Therapy Ortho Treatment Note   Norah Tejeda     Patient Name: Joan Block  : 1950  MRN: [id number]  Today's Date: 9/15/2021        Visit Date: 09/15/2021    Patient Active Problem List   Diagnosis   • Distal radial fracture   • Closed fracture of left distal radius   • Fracture, Colles, left, closed   • Essential hypertension   • Mixed hyperlipidemia        Past Medical History:   Diagnosis Date   • Arthritis of back 8 yrs ago   • Fracture of wrist 2021   • Fracture, radius 2021   • Fracture, ulna 2021   • Hyperlipidemia    • Hypertension    • Tendinitis of knee         Past Surgical History:   Procedure Laterality Date   • APPENDECTOMY     • FOOT SURGERY     • KNEE SURGERY     • ORIF ULNA/RADIUS FRACTURES Left 6/15/2021    Procedure: ULNA/RADIUS OPEN REDUCTION INTERNAL FIXATION;  Surgeon: Norman Brown MD;  Location: [facility];  Service: Orthopedics;  Laterality: Left;  ULNA/RADIAL OPEN REDUCTION INTERNAL FIXATION   • WRIST SURGERY           Visit Dx:    ICD-10-CM ICD-9-CM   1. Closed Colles' fracture of left radius with routine healing, subsequent encounter  S52.532D V54.12       OT Ortho     Row Name 09/15/21 1100             Subjective Pain    Pre-Treatment Pain Level  1  -EN         Left Upper Ext    Lt Wrist Flexion AROM  26  -EN      Lt Wrist Extension AROM  38  -EN        User Key  (r) = Recorded By, (t) = Taken By, (c) = Cosigned By    Initials Name Provider Type    EN Bailee Casillas OTR Occupational Therapist                  Therapy Education  Education Details: wrist/forearm strengthening  Given: HEP, Symptoms/condition management, Edema management  Program: Reinforced, Progressed  How Provided: Verbal, Demonstration  Provided to: Patient  Level of Understanding: Teach back education performed, Verbalized, Demonstrated    OT Assessment/Plan     Row Name 09/15/21 1226          OT Assessment    Functional Limitations  Performance in  leisure activities;Performance in self-care ADL;Performance in work activities;Limitation in home management  -EN     Impairments  Edema;Muscle strength;Pain;Range of motion  -EN     Assessment Comments  Patient improving with functional use of hand. Patient able to participate in strengthening exercises today including forearm pronation/supination strengthening, wrist strengthening with dumbell, and hand strengthening with hand gripper and resistive pins. Patient reported no increase in pain during strengthening exercises.  -EN        OT Plan    OT Plan Comments  Continue with goals.  -EN       User Key  (r) = Recorded By, (t) = Taken By, (c) = Cosigned By    Initials Name Provider Type    Bailee Estrada OTR Occupational Therapist           OT Goals     Row Name 09/15/21 1200          OT Short Term Goals    STG Date to Achieve  09/17/21  -EN     STG 1  Patient will demonstrate independence with HEP including ROM, edema management and scar care.  -EN     STG 1 Progress  Met  -EN     STG 2  Patient will improve BAPTISTE in left digits by 25 degrees for improved grasp.  -EN     STG 2 Progress  Met  -EN     STG 3  Patient will improve active L wrist flexion and extension by 15 degress.  -EN     STG 3 Progress  Met  -EN     STG 4  Patient will improve L forearm active pronation by 20 degrees.  -EN     STG 4 Progress  Met  -EN     STG 5  Patient will report 50% less pain during activity.  -EN     STG 5 Progress  Met  -EN     STG 6  Patient will improve left elbow AROM to WFL.  -EN     STG 6 Progress  Ongoing;Progressing  -EN        Long Term Goals    LTG Date to Achieve  10/01/21  -EN     LTG 1  Patient will demonstrate WFL left hand/wrist/forearm/elbow  AROM.  -EN     LTG 1 Progress  Ongoing  -EN     LTG 2  Patient will demonstrate WFL strength in L UE.  -EN     LTG 2 Progress  Ongoing  -EN     LTG 3  Patient will report pain 1/10 or less during activity.  -EN     LTG 3 Progress  Ongoing  -EN     LTG 4  Patient  will report independence with all ADL/IADLs.  -EN     LTG 4 Progress  Ongoing  -EN       User Key  (r) = Recorded By, (t) = Taken By, (c) = Cosigned By    Initials Name Provider Type    Bailee Estrada OTR Occupational Therapist          Modalities     Row Name 09/15/21 1100             Moist Heat    MH Applied  Yes  -EN      Location  left wrist/hand  -EN      OT Moist Heat Minutes  15  -EN      MH Prior to Rx  Yes  -EN        User Key  (r) = Recorded By, (t) = Taken By, (c) = Cosigned By    Initials Name Provider Type    Bailee Estrada OTABRAHAM Occupational Therapist        OT Exercises     Row Name 09/15/21 1200             Exercise 1    Exercise Name 1  retrograde edema massage- left elbow/forearm/wrist/hand  -EN         Exercise 2    Exercise Name 2  ROM exercises, elbow, forearm, wrist and hand  -EN         Exercise 3    Exercise Name 3  FMC activities- picked up 9 hole pegs and placed in pegboard  -EN         Exercise 4    Exercise Name 4  supination/pronation strengthening  -EN      Cueing 4  Verbal;Tactile  -EN      Equipment 4  Dumbell  -EN      Weights/Plates 4  -- 1.5  -EN      Sets 4  1  -EN      Reps 4  15  -EN         Exercise 5    Exercise Name 5  resistive pins  -EN         Exercise 6    Exercise Name 6  hand strengthening  -EN      Cueing 6  Verbal  -EN      Equipment 6  Hand Gripper  -EN      Weights/Plates 6  10  -EN      Sets 6  1  -EN      Reps 6  15  -EN      Time (Seconds) 6  3  -EN        User Key  (r) = Recorded By, (t) = Taken By, (c) = Cosigned By    Initials Name Provider Type    Bailee Estrada OTR Occupational Therapist                        Time Calculation:   OT Start Time: 1041  OT Stop Time: 1200  OT Time Calculation (min): 79 min  Timed Charges  96111 - OT Therapeutic Activity Minutes: 60  Untimed Charges  OT Moist Heat Minutes: 15  Total Minutes  Timed Charges Total Minutes: 60  Untimed Charges Total Minutes: 15   Total Minutes: 60     Therapy Charges for  Today     Code Description Service Date Service Provider Modifiers Qty    54109857306  OT THERAPEUTIC ACT EA 15 MIN 9/15/2021 Bailee Casillas OTR GO 4    [id number]  OT HOT OR COLD PACK TREAT [facility]ARE 9/15/2021 Bailee Casillas OTR GO 1                    KENNETH Chavez  9/15/2021

## 2021-09-15 NOTE — PROGRESS NOTES
September 15, 2021    Hello, may I speak with Joan Block?    My name is Odilia     I am  with [facility]  [facility]  [address]  [facility] [address] [phone].    Before we get started may I verify your date of birth? 1950    I am calling to officially welcome you to our practice and ask about your recent visit. Is this a good time to talk? no    Tell me about your visit with us. What things went well?  unavailable        We're always looking for ways to make our patients' experiences even better. Do you have recommendations on ways we may improve?  no    Overall were you satisfied with your first visit to our practice? no       I appreciate you taking the time to speak with me today. Is there anything else I can do for you? no      Thank you, and have a great day.

## 2021-09-17 ENCOUNTER — HOSPITAL ENCOUNTER (OUTPATIENT)
Dept: OCCUPATIONAL THERAPY | Facility: HOSPITAL | Age: 71
Setting detail: THERAPIES SERIES
Discharge: HOME | End: 2021-09-17

## 2021-09-17 DIAGNOSIS — S52.532D CLOSED COLLES' FRACTURE OF LEFT RADIUS WITH ROUTINE HEALING, SUBSEQUENT ENCOUNTER: Primary | ICD-10-CM

## 2021-09-17 PROCEDURE — 97530 THERAPEUTIC ACTIVITIES: CPT

## 2021-09-17 NOTE — THERAPY TREATMENT NOTE
Outpatient Occupational Therapy Ortho Treatment Note   Norah Tejeda     Patient Name: Joan Block  : 1950  MRN: [id number]  Today's Date: 2021        Visit Date: 2021    Patient Active Problem List   Diagnosis   • Distal radial fracture   • Closed fracture of left distal radius   • Fracture, Colles, left, closed   • Essential hypertension   • Mixed hyperlipidemia        Past Medical History:   Diagnosis Date   • Arthritis of back 8 yrs ago   • Fracture of wrist 2021   • Fracture, radius 2021   • Fracture, ulna 2021   • Hyperlipidemia    • Hypertension    • Tendinitis of knee         Past Surgical History:   Procedure Laterality Date   • APPENDECTOMY     • FOOT SURGERY     • KNEE SURGERY     • ORIF ULNA/RADIUS FRACTURES Left 6/15/2021    Procedure: ULNA/RADIUS OPEN REDUCTION INTERNAL FIXATION;  Surgeon: Norman Brown MD;  Location: [facility];  Service: Orthopedics;  Laterality: Left;  ULNA/RADIAL OPEN REDUCTION INTERNAL FIXATION   • WRIST SURGERY           Visit Dx:    ICD-10-CM ICD-9-CM   1. Closed Colles' fracture of left radius with routine healing, subsequent encounter  S52.532D V54.12       OT Ortho     Row Name 21 1100             Subjective Pain    Able to rate subjective pain?  yes  -EN      Pre-Treatment Pain Level  0  -EN      Post-Treatment Pain Level  0  -EN      Subjective Pain Comment  Pain with exercises/passive stretch  -EN         Left Upper Ext    Lt Elbow Extension/Flexion AROM  -25  -EN      Lt Elbow Pronation AROM  46  -EN      Lt Wrist Flexion AROM  26  -EN      Lt Wrist Extension AROM  38  -EN         LUE Edema - Circumference (cm)    Wrist Crease  16.4 cm  -EN        User Key  (r) = Recorded By, (t) = Taken By, (c) = Cosigned By    Initials Name Provider Type    EN Bailee Casillas OTR Occupational Therapist             Hand Therapy (last 24 hours)      Hand Eval     Row Name 21 1100             Left Extension AROM    II- MP  AROM  -15  -EN      II- PIP AROM  -20  -EN      II- DIP AROM  -10  -EN      II- BAPTISTE Left Extension AROM  -45  -EN      III- MP AROM  -12  -EN      III- PIP AROM  -49  -EN      III- DIP AROM  -26  -EN      III- BAPTISTE Left Extension AROM  -87  -EN      IV- MP AROM  -20  -EN      IV- PIP AROM  -49  -EN      IV- DIP AROM  -19  -EN      IV- BAPTISTE Left Extension AROM  -88  -EN      V- MP AROM  0  -EN      V- PIP AROM  -28  -EN      V- DIP AROM  -10  -EN      V- BAPTITSE Left Extension AROM  -38  -EN         Left Flexion AROM    II- MP AROM  45  -EN      II- PIP AROM  51  -EN      II- DIP AROM  30  -EN      II- BAPTISTE Left Flexion AROM  126  -EN      III- MP AROM  45  -EN      III- PIP AROM  91  -EN      III- DIP AROM  52  -EN      III- BAPTISTE Left Flexion AROM  188  -EN      IV- MP AROM  52  -EN      IV- PIP AROM  91  -EN      IV- DIP AROM  52  -EN      IV- BAPTISTE Left Flexion AROM  195  -EN      V- MP AROM  35  -EN      V- PIP AROM  71  -EN      V- DIP AROM  54  -EN      V- BAPTISTE Left Flexion AROM  160  -EN         Left Thumb AROM    MP Flexion - AROM  51  -EN      MP Extension- AROM  -11  -EN        User Key  (r) = Recorded By, (t) = Taken By, (c) = Cosigned By    Initials Name Provider Type    EN Bailee Casillas, OTR Occupational Therapist              Therapy Education  Education Details: elbow strengthening  Given: HEP, Symptoms/condition management, Edema management  Program: Reinforced, Progressed  How Provided: Verbal, Demonstration  Provided to: Patient  Level of Understanding: Teach back education performed, Verbalized, Demonstrated    OT Assessment/Plan     Row Name 09/17/21 1100          OT Assessment    Functional Limitations  Performance in leisure activities;Performance in self-care ADL;Performance in work activities;Limitation in home management  -EN     Impairments  Edema;Muscle strength;Pain;Range of motion  -EN     Assessment Comments  Patient continues to demonstrate improvement with BAPTISTE in digits. Digit 2 improved  to 81 degrees, 3 increased to 101 degrees, 4 increased to 107 degrees and digit 5 improved to 122 degrees. Digit one mcp flexion improved to 51. Elbow extension also improved. No improvement in wrist AROM. Patient doing well with strengthening exercises and edema also is decreasing.  -EN        OT Plan    OT Plan Comments  Continue with goals.  -EN       User Key  (r) = Recorded By, (t) = Taken By, (c) = Cosigned By    Initials Name Provider Type    Bailee Estrada OTABRAHAM Occupational Therapist               Modalities     Row Name 09/17/21 1300             Moist Heat    MH Applied  Yes  -EN      Location  left hand/wrist  -EN      OT Moist Heat Minutes  15  -EN      MH Prior to Rx  Yes  -EN        User Key  (r) = Recorded By, (t) = Taken By, (c) = Cosigned By    Initials Name Provider Type    Bailee Estrada OTR Occupational Therapist        OT Exercises     Row Name 09/17/21 1100             Exercise 1    Exercise Name 1  retrograde edema massage- left elbow/forearm/wrist/hand  -EN         Exercise 2    Exercise Name 2  ROM exercises, elbow, forearm, wrist and hand  -EN         Exercise 3    Exercise Name 3  FMC activities- picked up 9 hole pegs and placed in pegboard, picked up grooved pegs and placed in pegboard  -EN         Exercise 4    Exercise Name 4  supination/pronation strengthening  -EN      Cueing 4  Verbal;Tactile  -EN      Equipment 4  Dumbell  -EN      Weights/Plates 4  -- 1.5  -EN      Sets 4  1  -EN      Reps 4  15  -EN         Exercise 5    Exercise Name 5  wrist strenghtening- flexion/extension  -EN      Cueing 5  Verbal  -EN      Equipment 5  Dumbell  -EN      Weights/Plates 5  -- 1.5  -EN      Sets 5  1  -EN      Reps 5  15  -EN         Exercise 6    Exercise Name 6  hand strengthening  -EN      Cueing 6  Verbal  -EN      Equipment 6  Hand Gripper  -EN      Weights/Plates 6  10  -EN      Sets 6  1  -EN      Reps 6  15  -EN      Time (Seconds) 6  3  -EN         Exercise 7     Exercise Name 7  elbow strengthening- flexion/extension  -EN      Cueing 7  Verbal  -EN      Equipment 7  Dumbell  -EN      Weights/Plates 7  -- 1.5  -EN      Sets 7  1  -EN      Reps 7  15  -EN        User Key  (r) = Recorded By, (t) = Taken By, (c) = Cosigned By    Initials Name Provider Type    EN Bailee Casillas OTR Occupational Therapist                        Time Calculation:   OT Start Time: 1045  OT Stop Time: 1200  OT Time Calculation (min): 75 min  Timed Charges  58185 - OT Therapeutic Activity Minutes: 60  Untimed Charges  OT Moist Heat Minutes: 15  Total Minutes  Timed Charges Total Minutes: 60  Untimed Charges Total Minutes: 15   Total Minutes: 60     Therapy Charges for Today     Code Description Service Date Service Provider Modifiers Qty    81959568468 HC OT THERAPEUTIC ACT EA 15 MIN 9/17/2021 Bailee Casillas OTR GO 4    71742721618 HC OT HOT OR COLD PACK TREAT MCARE 9/17/2021 Bailee Casillas OTR GO 1                    KENNETH Chavez  9/17/2021

## 2021-09-20 ENCOUNTER — HOSPITAL ENCOUNTER (OUTPATIENT)
Dept: OCCUPATIONAL THERAPY | Facility: HOSPITAL | Age: 71
Setting detail: THERAPIES SERIES
Discharge: HOME | End: 2021-09-20

## 2021-09-20 DIAGNOSIS — S52.532D CLOSED COLLES' FRACTURE OF LEFT RADIUS WITH ROUTINE HEALING, SUBSEQUENT ENCOUNTER: Primary | ICD-10-CM

## 2021-09-20 PROCEDURE — 97530 THERAPEUTIC ACTIVITIES: CPT

## 2021-09-20 NOTE — THERAPY TREATMENT NOTE
Outpatient Occupational Therapy Ortho Treatment Note   [place]     Patient Name: Joan Block  : 1950  MRN: [id number]  Today's Date: 2021        Visit Date: 2021    Patient Active Problem List   Diagnosis   • Distal radial fracture   • Closed fracture of left distal radius   • Fracture, Colles, left, closed   • Essential hypertension   • Mixed hyperlipidemia        Past Medical History:   Diagnosis Date   • Arthritis of back 8 yrs ago   • Fracture of wrist 2021   • Fracture, radius 2021   • Fracture, ulna 2021   • Hyperlipidemia    • Hypertension    • Tendinitis of knee         Past Surgical History:   Procedure Laterality Date   • APPENDECTOMY     • FOOT SURGERY     • KNEE SURGERY     • ORIF ULNA/RADIUS FRACTURES Left 6/15/2021    Procedure: ULNA/RADIUS OPEN REDUCTION INTERNAL FIXATION;  Surgeon: Norman Brown MD;  Location: [facility];  Service: Orthopedics;  Laterality: Left;  ULNA/RADIAL OPEN REDUCTION INTERNAL FIXATION   • WRIST SURGERY           Visit Dx:    ICD-10-CM ICD-9-CM   1. Closed Colles' fracture of left radius with routine healing, subsequent encounter  S52.532D V54.12       OT Ortho     Row Name 21 1500             Subjective Pain    Able to rate subjective pain?  yes  -EN      Pre-Treatment Pain Level  0  -EN      Post-Treatment Pain Level  0  -EN      Subjective Pain Comment  Pain only with passive stretch  -EN         Posture/Observations    Posture/Observations Comments  appears to have decreased edema in left hand, wrist decreased since last therapy session  -EN         LUE Edema - Circumference (cm)    Wrist Crease  15.9 cm  -EN        User Key  (r) = Recorded By, (t) = Taken By, (c) = Cosigned By    Initials Name Provider Type    EN Bailee Casillas, OTR Occupational Therapist                  Therapy Education  Given: HEP, Symptoms/condition management, Edema management  Program: Reinforced, Progressed  How Provided: Verbal,  Demonstration  Provided to: Patient  Level of Understanding: Teach back education performed, Verbalized, Demonstrated    OT Assessment/Plan     Row Name 09/20/21 1508          OT Assessment    Functional Limitations  Performance in leisure activities;Performance in self-care ADL;Performance in work activities;Limitation in home management  -EN     Impairments  Edema;Muscle strength;Pain;Range of motion  -EN     Assessment Comments  Patient reports no pain today at rest or during activity. Patient did c/o pain with passive stretch (hand, wrist, elbow and forearm). Patient's strength is improving and patient reports she is using her hand more at home with IADLs such as cooking and cleaning.  -EN        OT Plan    OT Plan Comments  Continue with goals.  -EN       User Key  (r) = Recorded By, (t) = Taken By, (c) = Cosigned By    Initials Name Provider Type    Bailee Estrada OTR Occupational Therapist           OT Goals     Row Name 09/20/21 1500          STG Date to Achieve  10/01/21  -EN    STG 1  Patient will demonstrate independence with HEP including ROM, edema management and scar care.  -EN    STG 1 Progress  Met  -EN    STG 2  Patient will improve BAPTISTE in left digits by 25 degrees for improved grasp.  -EN    STG 2 Progress  Met  -EN    STG 3  Patient will improve active L wrist flexion and extension by 15 degress.  -EN    STG 3 Progress  Met  -EN    STG 4  Patient will improve L forearm active pronation by 20 degrees.  -EN    STG 4 Progress  Met  -EN    STG 5  Patient will report 50% less pain during activity.  -EN    STG 5 Progress  Met  -EN    STG 6  Patient will improve left elbow AROM to WFL.  -EN    STG 6 Progress  Ongoing;Progressing  -EN          LTG Date to Achieve  10/29/21  -EN    LTG 1  Patient will demonstrate WFL left hand/wrist/forearm/elbow  AROM.  -EN    LTG 1 Progress  Ongoing;Progressing  -EN    LTG 2  Patient will demonstrate WFL strength in L UE.  -EN    LTG 2 Progress  Ongoing  -EN     LTG 3  Patient will report pain 1/10 or less during activity.  -EN    LTG 3 Progress  Met  -EN    LTG 3 Progress Comments  No pain during strengthening exercises.  -EN    LTG 4  Patient will report independence with all ADL/IADLs.  -EN    LTG 4 Progress  Ongoing;Progressing  -EN    LTG 4 Progress Comments  Patient reports she has been using her left hand for cooking and housework.   -EN      User Key  (r) = Recorded By, (t) = Taken By, (c) = Cosigned By    Initials Name Provider Type    Bailee Estrada OTR Occupational Therapist          Modalities     Row Name 09/20/21 1045             Moist Heat    MH Applied  Yes  -EN      Location  left hand/wrist  -EN      OT Moist Heat Minutes  15  -EN      MH Prior to Rx  Yes  -EN        User Key  (r) = Recorded By, (t) = Taken By, (c) = Cosigned By    Initials Name Provider Type    Bailee Estrada OTR Occupational Therapist        OT Exercises     Row Name 09/20/21 1000             Exercise 1    Exercise Name 1  retrograde edema massage- left elbow/forearm/wrist/hand  -EN         Exercise 2    Exercise Name 2  ROM exercises, elbow, forearm, wrist and hand (PROM and AROM) -EN         Exercise 3    Exercise Name 3  FMC activities-EN         Exercise 4    Exercise Name 4  supination/pronation strengthening  -EN      Cueing 4  Verbal;Tactile  -EN      Equipment 4  Dumbell  -EN      Weights/Plates 4  -- 1.5# one set, one set with 2#  -EN      Sets 4  2  -EN      Reps 4  15  -EN         Exercise 5    Exercise Name 5  wrist strenghtening- flexion/extension  -EN      Cueing 5  Verbal  -EN      Equipment 5  Dumbell  -EN      Weights/Plates 5  2 1.5 one set, one set with 2#  -EN      Sets 5  2  -EN      Reps 5  15  -EN         Exercise 6    Exercise Name 6  hand strengthening  -EN      Cueing 6  Verbal  -EN      Equipment 6  Hand Gripper  -EN      Weights/Plates 6  10  -EN      Sets 6  1  -EN      Reps 6  25  -EN      Time (Seconds) 6  3  -EN         Exercise 7     Exercise Name 7  elbow strengthening- flexion/extension  -EN      Cueing 7  Verbal  -EN      Equipment 7  Dumbell  -EN      Weights/Plates 7  2  -EN      Sets 7  1  -EN      Reps 7  15  -EN        User Key  (r) = Recorded By, (t) = Taken By, (c) = Cosigned By    Initials Name Provider Type    EN Bailee Casillas OTR Occupational Therapist                        Time Calculation:   OT Start Time: 1045  OT Stop Time: 1200  OT Time Calculation (min): 75 min  Timed Charges  33333 - OT Therapeutic Activity Minutes: 60  Untimed Charges  OT Moist Heat Minutes: 15  Total Minutes  Timed Charges Total Minutes: 60  Untimed Charges Total Minutes: 15   Total Minutes: 60     Therapy Charges for Today     Code Description Service Date Service Provider Modifiers Qty    33641636848 HC OT THERAPEUTIC ACT EA 15 MIN 9/20/2021 Bailee Casillas OTR GO 4    [id number] HC OT HOT OR COLD PACK TREAT MCARE 9/20/2021 Bailee Casillas OTR GO 1                    KENNETH Chavez  9/20/2021

## 2021-09-22 ENCOUNTER — HOSPITAL ENCOUNTER (OUTPATIENT)
Dept: OCCUPATIONAL THERAPY | Facility: HOSPITAL | Age: 71
Setting detail: THERAPIES SERIES
Discharge: HOME | End: 2021-09-22

## 2021-09-22 DIAGNOSIS — S52.532D CLOSED COLLES' FRACTURE OF LEFT RADIUS WITH ROUTINE HEALING, SUBSEQUENT ENCOUNTER: Primary | ICD-10-CM

## 2021-09-22 PROCEDURE — 97530 THERAPEUTIC ACTIVITIES: CPT

## 2021-09-22 NOTE — THERAPY TREATMENT NOTE
Outpatient Occupational Therapy Ortho Treatment Note   Norah Tejeda     Patient Name: Joan Block  : 1950  MRN: [id number]  Today's Date: 2021        Visit Date: 2021    Patient Active Problem List   Diagnosis   • Distal radial fracture   • Closed fracture of left distal radius   • Fracture, Colles, left, closed   • Essential hypertension   • Mixed hyperlipidemia        Past Medical History:   Diagnosis Date   • Arthritis of back 8 yrs ago   • Fracture of wrist 2021   • Fracture, radius 2021   • Fracture, ulna 2021   • Hyperlipidemia    • Hypertension    • Tendinitis of knee         Past Surgical History:   Procedure Laterality Date   • APPENDECTOMY     • FOOT SURGERY     • KNEE SURGERY     • ORIF ULNA/RADIUS FRACTURES Left 6/15/2021    Procedure: ULNA/RADIUS OPEN REDUCTION INTERNAL FIXATION;  Surgeon: Norman Brown MD;  Location: [facility];  Service: Orthopedics;  Laterality: Left;  ULNA/RADIAL OPEN REDUCTION INTERNAL FIXATION   • WRIST SURGERY           Visit Dx:    ICD-10-CM ICD-9-CM   1. Closed Colles' fracture of left radius with routine healing, subsequent encounter  S52.532D V54.12       OT Ortho     Row Name 21 1200 21 1100          Subjective Pain    Pre-Treatment Pain Level  0  -EN  --     Post-Treatment Pain Level  0  -EN  --        Left Upper Ext    Lt Elbow Extension/Flexion AROM  --  -  -EN     Lt Elbow Pronation AROM  --  65  -EN     Lt Wrist Flexion AROM  --  26  -EN     Lt Wrist Extension AROM  --  41  -EN       User Key  (r) = Recorded By, (t) = Taken By, (c) = Cosigned By    Initials Name Provider Type    EN Bailee aCsillas OTR Occupational Therapist                  Therapy Education  Given: HEP, Symptoms/condition management, Edema management  Program: Reinforced, Progressed  How Provided: Verbal, Demonstration  Provided to: Patient  Level of Understanding: Teach back education performed, Verbalized, Demonstrated    OT  Assessment/Plan     Row Name 09/22/21 1321          OT Assessment    Functional Limitations  Performance in leisure activities;Performance in self-care ADL;Performance in work activities;Limitation in home management  -EN     Impairments  Edema;Muscle strength;Pain;Range of motion  -EN     Assessment Comments  Patient continues to demonstrate improvement with AROM of wrist, hand, forearm and elbow. Patient wrote name with left hand using a built up pin. Patient is progressing with strengthening exercises. Patient reports no pain during wrist strengthening but does have pain with PROM in L UE joints. Patient reports she is utilizing left UE more at home and reports she is cooking, cleaning, etc...  -EN        OT Plan    OT Plan Comments  Decrease to 2 X per week due to patient's work schedule. Will continue with goals.  -EN       User Key  (r) = Recorded By, (t) = Taken By, (c) = Cosigned By    Initials Name Provider Type    Bailee Estrada OTR Occupational Therapist           OT Goals     Row Name 09/22/21 1300          OT Short Term Goals    STG Date to Achieve  10/01/21  -EN     STG 1  Patient will demonstrate independence with HEP including ROM, edema management and scar care.  -EN     STG 1 Progress  Met  -EN     STG 2  Patient will improve BAPTISTE in left digits by 25 degrees for improved grasp.  -EN     STG 2 Progress  Met  -EN     STG 3  Patient will improve active L wrist flexion and extension by 15 degress.  -EN     STG 3 Progress  Met  -EN     STG 4  Patient will improve L forearm active pronation by 20 degrees.  -EN     STG 4 Progress  Met  -EN     STG 5  Patient will report 50% less pain during activity.  -EN     STG 5 Progress  Met  -EN     STG 6  Patient will improve left elbow AROM to WFL.  -EN     STG 6 Progress  Ongoing;Progressing  -EN        Long Term Goals    LTG Date to Achieve  10/29/21  -EN     LTG 1  Patient will demonstrate WFL left hand/wrist/forearm/elbow  AROM.  -EN     LTG 1  Progress  Ongoing;Progressing  -EN     LTG 2  Patient will demonstrate WFL strength in L UE.  -EN     LTG 2 Progress  Ongoing  -EN     LTG 3  Patient will report pain 1/10 or less during activity.  -EN     LTG 3 Progress  Met  -EN     LTG 4  Patient will report independence with all ADL/IADLs.  -EN     LTG 4 Progress  Ongoing;Progressing  -EN       User Key  (r) = Recorded By, (t) = Taken By, (c) = Cosigned By    Initials Name Provider Type    Bailee Estrada OTR Occupational Therapist            OT Exercises     Row Name 09/22/21 1200 09/22/21 1100          Subjective Pain    Able to rate subjective pain?  yes  -EN  --        Exercise 1    Exercise Name 1  --  retrograde edema massage- left elbow/forearm/wrist/hand  -EN        Exercise 2    Exercise Name 2  --  ROM exercises, elbow, forearm, wrist and hand  -EN        Exercise 3    Exercise Name 3  --  FMC activities- picked up 9 hole pegs and placed in pegboard, picked up grooved pegs and placed in pegboard  -EN        Exercise 4    Exercise Name 4  --  supination/pronation strengthening  -EN     Cueing 4  --  Verbal;Tactile  -EN     Equipment 4  --  Dumbell  -EN     Weights/Plates 4  --  2  -EN     Sets 4  --  2  -EN     Reps 4  --  15  -EN        Exercise 5    Exercise Name 5  --  wrist strenghtening- flexion/extension  -EN     Cueing 5  --  Verbal  -EN     Equipment 5  --  Dumbell  -EN     Weights/Plates 5  --  2  -EN     Sets 5  --  2  -EN     Reps 5  --  15  -EN        Exercise 6    Exercise Name 6  --  hand strengthening  -EN     Cueing 6  --  Verbal  -EN     Equipment 6  --  Hand Gripper  -EN     Weights/Plates 6  --  10  -EN     Sets 6  --  1  -EN     Reps 6  --  25  -EN     Time (Seconds) 6  --  3  -EN        Exercise 7    Exercise Name 7  --  elbow strengthening- flexion/extension  -EN     Cueing 7  --  Verbal  -EN     Equipment 7  --  Dumbell  -EN     Weights/Plates 7  --  2  -EN     Sets 7  --  1  -EN     Reps 7  --  15  -EN        Exercise 8     Exercise Name 8  --  wrote name with built up pen  -EN       User Key  (r) = Recorded By, (t) = Taken By, (c) = Cosigned By    Initials Name Provider Type    Bailee Estrada OTR Occupational Therapist                        Time Calculation:   OT Start Time: 1045  OT Stop Time: 1200  OT Time Calculation (min): 75 min  Timed Charges  92317 - OT Therapeutic Activity Minutes: 60  Total Minutes  Timed Charges Total Minutes: 60   Total Minutes: 60     Therapy Charges for Today     Code Description Service Date Service Provider Modifiers Qty    65143321278 HC OT HOT OR COLD PACK TREAT MCARE 9/22/2021 Bailee Casillas OTR GO 1    38029391152 HC OT THERAPEUTIC ACT EA 15 MIN 9/22/2021 Bailee Casillas OTR GO 4                    KENNETH Chavez  9/22/2021

## 2021-09-23 ENCOUNTER — OFFICE VISIT (OUTPATIENT)
Dept: ORTHOPEDIC SURGERY | Facility: CLINIC | Age: 71
End: 2021-09-23

## 2021-09-23 VITALS — BODY MASS INDEX: 34.23 KG/M2 | HEIGHT: 62 IN | WEIGHT: 186 LBS

## 2021-09-23 DIAGNOSIS — Z09 STATUS POST ORTHOPEDIC SURGERY, FOLLOW-UP EXAM: ICD-10-CM

## 2021-09-23 DIAGNOSIS — S52.532A CLOSED COLLES' FRACTURE OF LEFT RADIUS, INITIAL ENCOUNTER: Primary | ICD-10-CM

## 2021-09-23 PROCEDURE — 99213 OFFICE O/P EST LOW 20 MIN: CPT | Performed by: ORTHOPAEDIC SURGERY

## 2021-09-23 NOTE — PROGRESS NOTES
Subjective: Status post ORIF left distal radial fracture     Patient ID: Joan Block is a 71 y.o. female.    Chief Complaint:    History of Present Illness patient history of anxiety she is progressing nicely with physical therapy.  States she is gone back to work.  Her motion with the therapy is improving.  She is not experiencing significant of any pain.       Social History     Occupational History   • Not on file   Tobacco Use   • Smoking status: Former Smoker     Packs/day: 0.50     Years: 15.00     Pack years: 7.50     Types: Cigarettes     Start date: 1970     Quit date: 10/1/1985     Years since quittin.0   • Smokeless tobacco: Never Used   Vaping Use   • Vaping Use: Never used   Substance and Sexual Activity   • Alcohol use: No   • Drug use: No   • Sexual activity: Not Currently     Partners: Male     Birth control/protection: Post-menopausal      Review of Systems      Past Medical History:   Diagnosis Date   • Arthritis of back 8 yrs ago   • Fracture of wrist 2021   • Fracture, radius 2021   • Fracture, ulna 2021   • Hyperlipidemia    • Hypertension    • Tendinitis of knee      Past Surgical History:   Procedure Laterality Date   • APPENDECTOMY     • FOOT SURGERY     • KNEE SURGERY     • ORIF ULNA/RADIUS FRACTURES Left 6/15/2021    Procedure: ULNA/RADIUS OPEN REDUCTION INTERNAL FIXATION;  Surgeon: Norman Brown MD;  Location: [facility];  Service: Orthopedics;  Laterality: Left;  ULNA/RADIAL OPEN REDUCTION INTERNAL FIXATION   • WRIST SURGERY       Family History   Problem Relation Age of Onset   • Cancer Mother    • Breast cancer Mother    • Diabetes Father          Objective:  There were no vitals filed for this visit.      21  1250   Weight: 84.4 kg (186 lb)     Body mass index is 34.01 kg/m².        Ortho Exam   She is alert and oriented.  Still does not have much in the way of active motion of the index finger but passively can flex the MP joint probably  30 to 40 degrees the ring long and little finger she can bring probably within a centimeter maybe a centimeter half of her palm of her hand.  Volar and volar flexion of the wrist is maybe a total of 20 to 30 degrees.  She has good capillary refill.  Still has some fusiform swelling to the fingers but she is wearing the compressive sleeve and that is helping.  No motor or sensory deficit.    Assessment:        1. Closed Colles' fracture of left radius, initial encounter    2. Status post orthopedic surgery, follow-up exam           Plan: Continue to physical therapy.  She is making progress.  Return to see me in a month again no x-ray necessary.            Work Status:    [employer] query complete.    Orders:  No orders of the defined types were placed in this encounter.      Medications:  No orders of the defined types were placed in this encounter.      Followup:  Return in about 4 weeks (around 10/21/2021).          Dictated utilizing Dragon dictation

## 2021-09-27 ENCOUNTER — HOSPITAL ENCOUNTER (OUTPATIENT)
Dept: OCCUPATIONAL THERAPY | Facility: HOSPITAL | Age: 71
Setting detail: THERAPIES SERIES
Discharge: HOME | End: 2021-09-27

## 2021-09-27 DIAGNOSIS — S52.532D CLOSED COLLES' FRACTURE OF LEFT RADIUS WITH ROUTINE HEALING, SUBSEQUENT ENCOUNTER: Primary | ICD-10-CM

## 2021-09-27 PROCEDURE — 97530 THERAPEUTIC ACTIVITIES: CPT

## 2021-09-27 NOTE — THERAPY TREATMENT NOTE
Outpatient Occupational Therapy Ortho Treatment Note   [place]     Patient Name: Joan Block  : 1950  MRN: [id number]  Today's Date: 2021        Visit Date: 2021    Patient Active Problem List   Diagnosis   • Distal radial fracture   • Closed fracture of left distal radius   • Fracture, Colles, left, closed   • Essential hypertension   • Mixed hyperlipidemia        Past Medical History:   Diagnosis Date   • Arthritis of back 8 yrs ago   • Fracture of wrist 2021   • Fracture, radius 2021   • Fracture, ulna 2021   • Hyperlipidemia    • Hypertension    • Tendinitis of knee         Past Surgical History:   Procedure Laterality Date   • APPENDECTOMY     • FOOT SURGERY     • KNEE SURGERY     • ORIF ULNA/RADIUS FRACTURES Left 6/15/2021    Procedure: ULNA/RADIUS OPEN REDUCTION INTERNAL FIXATION;  Surgeon: Norman Brown MD;  Location: [facility];  Service: Orthopedics;  Laterality: Left;  ULNA/RADIAL OPEN REDUCTION INTERNAL FIXATION   • WRIST SURGERY           Visit Dx:    ICD-10-CM ICD-9-CM   1. Closed Colles' fracture of left radius with routine healing, subsequent encounter  S52.532D V54.12       OT Ortho     Row Name 21 1045             Subjective Pain    Able to rate subjective pain?  yes  -EN      Pre-Treatment Pain Level  0  -EN      Post-Treatment Pain Level  0  -EN      Subjective Pain Comment  Patient reports MD appointment went well. Reports MD happy with progress.  -EN         Left Upper Ext    Lt Elbow Extension/Flexion AROM  -  -EN      Lt Elbow Pronation AROM  65  -EN      Lt Wrist Flexion AROM  26  -EN      Lt Wrist Extension AROM  41  -EN        User Key  (r) = Recorded By, (t) = Taken By, (c) = Cosigned By    Initials Name Provider Type    Bailee Estrada OTR Occupational Therapist             Hand Therapy (last 24 hours)      Hand Eval     Row Name 21 1100 21 1045          Left Extension AROM    II- MP AROM  -13  -EN   --     II- PIP AROM  -26  -EN  --     II- DIP AROM  -12  -EN  --     II- BAPTISTE Left Extension AROM  -51  -EN  --     III- MP AROM  -10  -EN  --     III- PIP AROM  -43  -EN  --     III- DIP AROM  -31  -EN  --     III- BAPTISTE Left Extension AROM  -84  -EN  --     IV- MP AROM  -20  -EN  --     IV- PIP AROM  -47  -EN  --     IV- DIP AROM  -24  -EN  --     IV- BAPTISTE Left Extension AROM  -91  -EN  --     V- MP AROM  -5  -EN  --     V- PIP AROM  -35  -EN  --     V- DIP AROM  -19  -EN  --     V- BAPTISTE Left Extension AROM  -59  -EN  --        Left Flexion AROM    II- MP AROM  43  -EN  --     II- PIP AROM  64  -EN  --     II- DIP AROM  30  -EN  --     II- BAPTISTE Left Flexion AROM  137  -EN  --     III- MP AROM  50  -EN  --     III- PIP AROM  92  -EN  --     III- DIP AROM  64  -EN  --     III- BAPTISTE Left Flexion AROM  206  -EN  --     IV- MP AROM  51  -EN  --     IV- PIP AROM  91  -EN  --     IV- DIP AROM  51  -EN  --     IV- BAPTISTE Left Flexion AROM  193  -EN  --     V- MP AROM  35  -EN  --     V- PIP AROM  67  -EN  --     V- DIP AROM  52  -EN  --     V- BAPTISTE Left Flexion AROM  154  -EN  --        Left Thumb AROM    MP Flexion - AROM  50  -EN  --     MP Extension- AROM  -18  -EN  --     IP Flexion - AROM  25  -EN  --         Strength Right    # Reps  1  -EN  --     Right Rung  2  -EN  --     Right  Test 1  30  -EN  --      Strength Average Right  30  -EN  --         Strength Left    # Reps  1  -EN  --     Left Rung  2  -EN  --     Left  Test 1  2  -EN  --      Strength Average Left  2  -EN  --        Therapy Education    Given  --  HEP;Symptoms/condition management;Edema management  -EN     Program  --  Reinforced;Progressed  -EN     How Provided  --  Verbal;Demonstration  -EN     Provided to  --  Patient  -EN     Level of Understanding  --  Teach back education performed;Verbalized;Demonstrated  -EN       User Key  (r) = Recorded By, (t) = Taken By, (c) = Cosigned By    Initials Name Provider Type    EN Sonja  KENNETH Mccarty Occupational Therapist              Therapy Education  Given: HEP, Symptoms/condition management, Edema management  Program: Reinforced, Progressed  How Provided: Verbal, Demonstration  Provided to: Patient  Level of Understanding: Teach back education performed, Verbalized, Demonstrated    OT Assessment/Plan     Row Name 09/27/21 1429          OT Assessment    Functional Limitations  Performance in leisure activities;Performance in self-care ADL;Performance in work activities;Limitation in home management  -EN     Impairments  Edema;Muscle strength;Pain;Range of motion  -EN     Assessment Comments  Patient seen 2 x per week last week due to work schedule. Patient with no improvement in wrist, forearm or elbow however did have an improvement in BAPTISTE in digits 2,and 3. Patient issued flexion glove to add to HEP for improved functional grasp. Patient progressing with strengthening program. BAPTISTE in digit 2 improved from 81 to 86 degrees, digit 3 improved from 101 to 122 degrees, dight 4 decreased from 107 to 102, and digit 5 decreased from 122 to 95 degrees.  Patient decline may be attributed to decreased therapy visits, reinforced HEP and added flexion glove to be worn short periods 3 X per day.  -EN        OT Plan    OT Plan Comments  continue with goals.  -EN       User Key  (r) = Recorded By, (t) = Taken By, (c) = Cosigned By    Initials Name Provider Type    Bailee Estrada OTR Occupational Therapist           OT Goals     Row Name 09/27/21 1045          OT Short Term Goals    STG Date to Achieve  10/01/21  -EN     STG 1  Patient will demonstrate independence with HEP including ROM, edema management and scar care.  -EN     STG 1 Progress  Met  -EN     STG 2  Patient will improve BAPTISTE in left digits by 25 degrees for improved grasp.  -EN     STG 2 Progress  Met  -EN     STG 3  Patient will improve active L wrist flexion and extension by 15 degress.  -EN     STG 3 Progress  Met  -EN     STG 4   Patient will improve L forearm active pronation by 20 degrees.  -EN     STG 4 Progress  Met  -EN     STG 5  Patient will report 50% less pain during activity.  -EN     STG 5 Progress  Met  -EN     STG 6  Patient will improve left elbow AROM to WFL.  -EN     STG 6 Progress  Ongoing;Progressing  -EN        Long Term Goals    LTG Date to Achieve  10/29/21  -EN     LTG 1  Patient will demonstrate WFL left hand/wrist/forearm/elbow  AROM.  -EN     LTG 1 Progress  Ongoing;Progressing  -EN     LTG 2  Patient will demonstrate WFL strength in L UE.  -EN     LTG 2 Progress  Ongoing  -EN     LTG 3  Patient will report pain 1/10 or less during activity.  -EN     LTG 3 Progress  Met  -EN     LTG 4  Patient will report independence with all ADL/IADLs.  -EN     LTG 4 Progress  Ongoing;Progressing  -EN       User Key  (r) = Recorded By, (t) = Taken By, (c) = Cosigned By    Initials Name Provider Type    Bailee Estrada OTR Occupational Therapist                              Time Calculation:   OT Start Time: 1044  OT Stop Time: 1200  OT Time Calculation (min): 76 min  Timed Charges  22032 - OT Therapeutic Activity Minutes: 60  Total Minutes  Timed Charges Total Minutes: 60   Total Minutes: 60     Therapy Charges for Today     Code Description Service Date Service Provider Modifiers Qty    14236302287  OT THERAPEUTIC ACT EA 15 MIN 9/27/2021 Bailee Casillas OTR GO 4    [id number]  OT HOT OR COLD PACK TREAT MCARE 9/27/2021 Bailee Casillas OTR GO 1                    KENNETH Chavez  9/27/2021

## 2021-09-29 ENCOUNTER — HOSPITAL ENCOUNTER (OUTPATIENT)
Dept: OCCUPATIONAL THERAPY | Facility: HOSPITAL | Age: 71
Setting detail: THERAPIES SERIES
Discharge: HOME | End: 2021-09-29

## 2021-09-29 DIAGNOSIS — S52.532D CLOSED COLLES' FRACTURE OF LEFT RADIUS WITH ROUTINE HEALING, SUBSEQUENT ENCOUNTER: Primary | ICD-10-CM

## 2021-09-29 PROCEDURE — 97530 THERAPEUTIC ACTIVITIES: CPT

## 2021-09-29 NOTE — THERAPY TREATMENT NOTE
"Outpatient Occupational Therapy Ortho Treatment Note   Norah Tejeda     Patient Name: Joan Block  : 1950  MRN: [id number]  Today's Date: 2021        Visit Date: 2021    Patient Active Problem List   Diagnosis   • Distal radial fracture   • Closed fracture of left distal radius   • Fracture, Colles, left, closed   • Essential hypertension   • Mixed hyperlipidemia        Past Medical History:   Diagnosis Date   • Arthritis of back 8 yrs ago   • Fracture of wrist 2021   • Fracture, radius 2021   • Fracture, ulna 2021   • Hyperlipidemia    • Hypertension    • Tendinitis of knee         Past Surgical History:   Procedure Laterality Date   • APPENDECTOMY     • FOOT SURGERY     • KNEE SURGERY     • ORIF ULNA/RADIUS FRACTURES Left 6/15/2021    Procedure: ULNA/RADIUS OPEN REDUCTION INTERNAL FIXATION;  Surgeon: Norman Brown MD;  Location: [facility];  Service: Orthopedics;  Laterality: Left;  ULNA/RADIAL OPEN REDUCTION INTERNAL FIXATION   • WRIST SURGERY           Visit Dx:    ICD-10-CM ICD-9-CM   1. Closed Colles' fracture of left radius with routine healing, subsequent encounter  S52.532D V54.12       OT Ortho     Row Name 21 1200             Subjective Pain    Able to rate subjective pain?  yes  -EN      Pre-Treatment Pain Level  0  -EN      Post-Treatment Pain Level  0  -EN      Subjective Pain Comment  Patient reports, \"I can pull up my pants now!\"  -EN         Left Upper Ext    Lt Elbow Pronation AROM  66  -EN        User Key  (r) = Recorded By, (t) = Taken By, (c) = Cosigned By    Initials Name Provider Type    EN Bailee Casillas OTR Occupational Therapist             Hand Therapy (last 24 hours)      Hand Eval     Row Name 21 1200              Strength Left    # Reps  1  -EN      Left Rung  2  -EN      Left  Test 1  5  -EN       Strength Average Left  5  -EN         Therapy Education    Given  HEP;Symptoms/condition management;Edema " management  -EN      Program  Progressed;Reinforced  -EN      How Provided  Verbal;Demonstration  -EN      Provided to  Patient  -EN      Level of Understanding  Teach back education performed;Verbalized;Demonstrated  -EN        User Key  (r) = Recorded By, (t) = Taken By, (c) = Cosigned By    Initials Name Provider Type    Bailee Estrada OTR Occupational Therapist              Therapy Education  Given: HEP, Symptoms/condition management, Edema management  Program: Progressed, Reinforced  How Provided: Verbal, Demonstration  Provided to: Patient  Level of Understanding: Teach back education performed, Verbalized, Demonstrated    OT Assessment/Plan     Row Name 09/29/21 1241          OT Assessment    Functional Limitations  Performance in leisure activities;Performance in self-care ADL;Performance in work activities;Limitation in home management  -EN     Impairments  Edema;Muscle strength;Pain;Range of motion  -EN     Assessment Comments  Significant improvement with fine motor coordination/L UE functional hand use evidenced by significant improvement with 9 Hole Peg score. Patient stated she is using both hands now to pull up her pants and she is writing at work with her left hand. Patient also continues to improve forearm pronation.  up to 5#. Strengthening program progressed.  -EN        OT Plan    OT Plan Comments  Continue with goals.  -EN       User Key  (r) = Recorded By, (t) = Taken By, (c) = Cosigned By    Initials Name Provider Type    Bailee Estrada OTR Occupational Therapist           OT Goals     Row Name 09/29/21 1200          OT Short Term Goals    STG Date to Achieve  10/01/21  -EN     STG 1  Patient will demonstrate independence with HEP including ROM, edema management and scar care.  -EN     STG 1 Progress  Met  -EN     STG 2  Patient will improve BAPTISTE in left digits by 25 degrees for improved grasp.  -EN     STG 2 Progress  Met  -EN     STG 3  Patient will improve active L  wrist flexion and extension by 15 degress.  -EN     STG 3 Progress  Met  -EN     STG 4  Patient will improve L forearm active pronation by 20 degrees.  -EN     STG 4 Progress  Met  -EN     STG 5  Patient will report 50% less pain during activity.  -EN     STG 5 Progress  Met  -EN     STG 6  Patient will improve left elbow AROM to WFL.  -EN     STG 6 Progress  Ongoing;Progressing  -EN        Long Term Goals    LTG Date to Achieve  10/29/21  -EN     LTG 1  Patient will demonstrate WFL left hand/wrist/forearm/elbow  AROM.  -EN     LTG 1 Progress  Ongoing;Progressing  -EN     LTG 2  Patient will demonstrate WFL strength in L UE.  -EN     LTG 2 Progress  Ongoing  -EN     LTG 3  Patient will report pain 1/10 or less during activity.  -EN     LTG 3 Progress  Met  -EN     LTG 4  Patient will report independence with all ADL/IADLs.  -EN     LTG 4 Progress  Ongoing;Progressing  -EN       User Key  (r) = Recorded By, (t) = Taken By, (c) = Cosigned By    Initials Name Provider Type    Bailee Estrada OTR Occupational Therapist            OT Exercises     Row Name 09/29/21 1100             Exercise 1    Exercise Name 1  retrograde edema massage- left elbow/forearm/wrist/hand  -EN         Exercise 2    Exercise Name 2  ROM exercises, elbow, forearm, wrist and hand  -EN         Exercise 3    Exercise Name 3  FMC activities  -EN         Exercise 4    Exercise Name 4  supination/pronation strengthening  -EN      Cueing 4  Verbal;Tactile  -EN      Equipment 4  Dumbell  -EN      Weights/Plates 4  2  -EN      Sets 4  2  -EN      Reps 4  15  -EN         Exercise 5    Exercise Name 5  wrist strenghtening- flexion/extension  -EN      Cueing 5  Verbal  -EN      Equipment 5  Dumbell  -EN      Weights/Plates 5  2  -EN      Sets 5  2  -EN      Reps 5  15  -EN         Exercise 6    Exercise Name 6  hand strengthening  -EN      Cueing 6  Verbal  -EN      Equipment 6  Hand Gripper  -EN      Weights/Plates 6  10  -EN      Sets 6  1  -EN       Reps 6  25  -EN      Time (Seconds) 6  3  -EN         Exercise 7    Exercise Name 7  hand strengthening- resistive pins  -EN      Resistance 7  Yellow;Red attempted green, able to barely open/close one pin  -EN      Sets 7  2  -EN         Exercise 8    Exercise Name 8  digi-flex  -EN      Cueing 8  Verbal  -EN      Resistance 8  Yellow  -EN      Sets 8  1  -EN      Reps 8  15  -EN      Time (Seconds) 8  3  -EN        User Key  (r) = Recorded By, (t) = Taken By, (c) = Cosigned By    Initials Name Provider Type    Bailee Estrada OTR Occupational Therapist            9 Hole Peg  9-Hole Peg Left: 34  9-Hole Peg Right: 20 (on 9/13, error, 50 seconds was for left hand)           Time Calculation:   OT Start Time: 1050  OT Stop Time: 1208  OT Time Calculation (min): 78 min  Timed Charges  83517 - OT Therapeutic Activity Minutes: 60  Total Minutes  Timed Charges Total Minutes: 60   Total Minutes: 60     Therapy Charges for Today     Code Description Service Date Service Provider Modifiers Qty    26510468353 HC OT THERAPEUTIC ACT EA 15 MIN 9/29/2021 Bailee Casillas OTR GO 4    [id number]  OT HOT OR COLD PACK TREAT MCARE 9/29/2021 Bailee Casillas OTR GO 1                    KENNETH Chavez  9/29/2021

## 2021-10-04 ENCOUNTER — HOSPITAL ENCOUNTER (OUTPATIENT)
Dept: OCCUPATIONAL THERAPY | Facility: HOSPITAL | Age: 71
Setting detail: THERAPIES SERIES
Discharge: HOME | End: 2021-10-04

## 2021-10-04 DIAGNOSIS — S52.532D CLOSED COLLES' FRACTURE OF LEFT RADIUS WITH ROUTINE HEALING, SUBSEQUENT ENCOUNTER: Primary | ICD-10-CM

## 2021-10-04 PROCEDURE — 97530 THERAPEUTIC ACTIVITIES: CPT

## 2021-10-04 NOTE — THERAPY TREATMENT NOTE
Outpatient Occupational Therapy Ortho Treatment Note   [place]     Patient Name: Joan Block  : 1950  MRN: [id number]  Today's Date: 10/4/2021        Visit Date: 10/04/2021    Patient Active Problem List   Diagnosis   • Distal radial fracture   • Closed fracture of left distal radius   • Fracture, Colles, left, closed   • Essential hypertension   • Mixed hyperlipidemia        Past Medical History:   Diagnosis Date   • Arthritis of back 8 yrs ago   • Fracture of wrist 2021   • Fracture, radius 2021   • Fracture, ulna 2021   • Hyperlipidemia    • Hypertension    • Tendinitis of knee         Past Surgical History:   Procedure Laterality Date   • APPENDECTOMY     • FOOT SURGERY     • KNEE SURGERY     • ORIF ULNA/RADIUS FRACTURES Left 6/15/2021    Procedure: ULNA/RADIUS OPEN REDUCTION INTERNAL FIXATION;  Surgeon: Norman Brown MD;  Location: [facility];  Service: Orthopedics;  Laterality: Left;  ULNA/RADIAL OPEN REDUCTION INTERNAL FIXATION   • WRIST SURGERY           Visit Dx:    ICD-10-CM ICD-9-CM   1. Closed Colles' fracture of left radius with routine healing, subsequent encounter  S52.532D V54.12         OT Neuro     Row Name 10/04/21 1300             Left Upper Ext    Lt Wrist Flexion AROM  26  -EN      Lt Wrist Extension AROM  43  -EN        User Key  (r) = Recorded By, (t) = Taken By, (c) = Cosigned By    Initials Name Provider Type    Bailee Estrada OTR Occupational Therapist                Therapy Education  Given: HEP, Symptoms/condition management, Edema management  Program: Progressed, Reinforced  How Provided: Verbal, Demonstration  Provided to: Patient  Level of Understanding: Teach back education performed, Verbalized, Demonstrated    OT Assessment/Plan     Row Name 10/04/21 1332          OT Assessment    Functional Limitations  Performance in leisure activities;Performance in self-care ADL;Performance in work activities;Limitation in home management   -EN     Impairments  Edema;Muscle strength;Pain;Range of motion  -EN     Assessment Comments  Patient with decreased edema thoughout L UE. Patient reports she is using her hand to put on her necklace, writing, using cash register at work etc.. Patient reports no pain except with passive stretch. Patient had slight improvement with wrist extension. Continues to be motivated.  -EN        OT Plan    OT Plan Comments  Continue with goals.  -EN       User Key  (r) = Recorded By, (t) = Taken By, (c) = Cosigned By    Initials Name Provider Type    Bailee Estrada, OTR Occupational Therapist           OT Goals     Row Name 10/04/21 1100          OT Short Term Goals    STG Date to Achieve  10/01/21  -EN     STG 1  Patient will demonstrate independence with HEP including ROM, edema management and scar care.  -EN     STG 1 Progress  Met  -EN     STG 2  Patient will improve BAPTISTE in left digits by 25 degrees for improved grasp.  -EN     STG 2 Progress  Met  -EN     STG 3  Patient will improve active L wrist flexion and extension by 15 degress.  -EN     STG 3 Progress  Met  -EN     STG 4  Patient will improve L forearm active pronation by 20 degrees.  -EN     STG 4 Progress  Met  -EN     STG 5  Patient will report 50% less pain during activity.  -EN     STG 5 Progress  Met  -EN     STG 6  Patient will improve left elbow AROM to WFL.  -EN     STG 6 Progress  Ongoing;Progressing  -EN        Long Term Goals    LTG Date to Achieve  10/29/21  -EN     LTG 1  Patient will demonstrate WFL left hand/wrist/forearm/elbow  AROM.  -EN     LTG 1 Progress  Ongoing;Progressing  -EN     LTG 2  Patient will demonstrate WFL strength in L UE.  -EN     LTG 2 Progress  Ongoing  -EN     LTG 3  Patient will report pain 1/10 or less during activity.  -EN     LTG 3 Progress  Met  -EN     LTG 4  Patient will report independence with all ADL/IADLs.  -EN     LTG 4 Progress  Ongoing;Progressing  -EN       User Key  (r) = Recorded By, (t) = Taken By,  (c) = Cosigned By    Initials Name Provider Type    EN Bailee Casillas OTR Occupational Therapist          Modalities     Row Name 10/04/21 1100             Moist Heat    MH Applied  Yes  -EN      Location  left hand/wrist  -EN      OT Moist Heat Minutes  15  -EN      MH Prior to Rx  Yes  -EN        User Key  (r) = Recorded By, (t) = Taken By, (c) = Cosigned By    Initials Name Provider Type    EN Bailee Casillas OTR Occupational Therapist        OT Exercises     Row Name 10/04/21 1100             Exercise 1    Exercise Name 1  retrograde edema massage- left elbow/forearm/wrist/hand  -EN         Exercise 2    Exercise Name 2  ROM exercises, elbow, forearm, wrist and hand  -EN         Exercise 3    Exercise Name 3  FMC activities  -EN         Exercise 4    Exercise Name 4  supination/pronation strengthening  -EN      Cueing 4  Verbal;Tactile  -EN      Equipment 4  Dumbell  -EN      Weights/Plates 4  3  -EN      Sets 4  2  -EN      Reps 4  20  -EN         Exercise 5    Exercise Name 5  wrist strenghtening- flexion/extension  -EN      Cueing 5  Verbal  -EN      Equipment 5  Dumbell  -EN      Weights/Plates 5  3 flexion 2#, ext 3# (unable to fully flex with 3)  -EN      Sets 5  2  -EN      Reps 5  20  -EN         Exercise 6    Exercise Name 6  hand strengthening  -EN      Cueing 6  Verbal  -EN      Equipment 6  Hand Gripper  -EN      Weights/Plates 6  10  -EN      Sets 6  1  -EN      Reps 6  25  -EN      Time (Seconds) 6  3  -EN         Exercise 7    Exercise Name 7  hand strengthening- resistive pins  -EN      Resistance 7  Yellow;Red attempted green, able to barely open/close one pin  -EN      Sets 7  2  -EN         Exercise 8    Exercise Name 8  digi-flex  -EN      Cueing 8  Verbal  -EN      Resistance 8  Red  -EN      Sets 8  1  -EN      Reps 8  15  -EN      Time (Seconds) 8  3  -EN        User Key  (r) = Recorded By, (t) = Taken By, (c) = Cosigned By    Initials Name Provider Type    MULUGETA Casillas  KENNETH Mccarty Occupational Therapist                        Time Calculation:   OT Start Time: 1045  OT Stop Time: 1200  OT Time Calculation (min): 75 min  Timed Charges  55948 - OT Therapeutic Activity Minutes: 60  Untimed Charges  OT Moist Heat Minutes: 15  Total Minutes  Timed Charges Total Minutes: 60  Untimed Charges Total Minutes: 15   Total Minutes: 60     Therapy Charges for Today     Code Description Service Date Service Provider Modifiers Qty    52446258428 HC OT THERAPEUTIC ACT EA 15 MIN 10/4/2021 Bailee Casillas OTR GO 4    06256250690 HC OT HOT OR COLD PACK TREAT MCARE 10/4/2021 Bailee Casillas OTR GO 1                    KENNETH Chavez  10/4/2021

## 2021-10-06 ENCOUNTER — HOSPITAL ENCOUNTER (OUTPATIENT)
Dept: OCCUPATIONAL THERAPY | Facility: HOSPITAL | Age: 71
Setting detail: THERAPIES SERIES
Discharge: HOME | End: 2021-10-06

## 2021-10-06 DIAGNOSIS — S52.532D CLOSED COLLES' FRACTURE OF LEFT RADIUS WITH ROUTINE HEALING, SUBSEQUENT ENCOUNTER: Primary | ICD-10-CM

## 2021-10-06 PROCEDURE — 97530 THERAPEUTIC ACTIVITIES: CPT

## 2021-10-06 NOTE — THERAPY TREATMENT NOTE
Outpatient Occupational Therapy Ortho Progress Note   [place]     Patient Name: Joan Block  : 1950  MRN: [id number]  Today's Date: 10/6/2021        Visit Date: 10/06/2021    Patient Active Problem List   Diagnosis   • Distal radial fracture   • Closed fracture of left distal radius   • Fracture, Colles, left, closed   • Essential hypertension   • Mixed hyperlipidemia        Past Medical History:   Diagnosis Date   • Arthritis of back 8 yrs ago   • Fracture of wrist 2021   • Fracture, radius 2021   • Fracture, ulna 2021   • Hyperlipidemia    • Hypertension    • Tendinitis of knee         Past Surgical History:   Procedure Laterality Date   • APPENDECTOMY     • FOOT SURGERY     • KNEE SURGERY     • ORIF ULNA/RADIUS FRACTURES Left 6/15/2021    Procedure: ULNA/RADIUS OPEN REDUCTION INTERNAL FIXATION;  Surgeon: Norman Brown MD;  Location: [facility] [place];  Service: Orthopedics;  Laterality: Left;  ULNA/RADIAL OPEN REDUCTION INTERNAL FIXATION   • WRIST SURGERY           Visit Dx:    ICD-10-CM ICD-9-CM   1. Closed Colles' fracture of left radius with routine healing, subsequent encounter  S52.532D V54.12       OT Ortho     Row Name 10/06/21 1100             Left Upper Ext    Lt Elbow Pronation AROM  66  -EN      Lt Wrist Flexion AROM  26  -EN      Lt Wrist Extension AROM  43  -EN        User Key  (r) = Recorded By, (t) = Taken By, (c) = Cosigned By    Initials Name Provider Type    Bailee Estrada OTR Occupational Therapist             Hand Therapy (last 24 hours)      Hand Eval     Row Name 10/06/21 1300             Left Extension AROM    II- MP AROM  -7  -EN      II- PIP AROM  -27  -EN      II- DIP AROM  -5  -EN      II- BAPTISTE Left Extension AROM  -39  -EN      III- MP AROM  -9  -EN      III- PIP AROM  -49  -EN      III- DIP AROM  -30  -EN      III- BAPTISTE Left Extension AROM  -88  -EN      IV- MP AROM  -24  -EN      IV- PIP AROM  -49  -EN      IV- DIP AROM  -28  -EN      IV-  BAPTISTE Left Extension AROM  -101  -EN      V- MP AROM  0  -EN      V- PIP AROM  -31  -EN      V- DIP AROM  -23  -EN      V- BAPTISTE Left Extension AROM  -54  -EN         Left Flexion AROM    II- MP AROM  50  -EN      II- PIP AROM  66  -EN      II- DIP AROM  26  -EN      II- BAPTISTE Left Flexion AROM  142  -EN      III- MP AROM  74  -EN      III- PIP AROM  93  -EN      III- DIP AROM  56  -EN      III- BAPTISTE Left Flexion AROM  223  -EN      IV- MP AROM  55  -EN      IV- PIP AROM  92  -EN      IV- DIP AROM  56  -EN      IV- BAPTISTE Left Flexion AROM  203  -EN      V- MP AROM  35  -EN      V- PIP AROM  79  -EN      V- DIP AROM  61  -EN      V- BAPTISTE Left Flexion AROM  175  -EN         Left Thumb AROM    MP Flexion - AROM  50  -EN      MP Extension- AROM  -13  -EN      IP Flexion - AROM  26  -EN          Strength Left    # Reps  1  -EN      Left Rung  2  -EN      Left  Test 1  7  -EN       Strength Average Left  7  -EN         Therapy Education    Given  HEP;Symptoms/condition management;Edema management  -EN      Program  Reinforced;Progressed  -EN      How Provided  Verbal;Demonstration  -EN      Provided to  Patient  -EN      Level of Understanding  Verbalized;Demonstrated  -EN        User Key  (r) = Recorded By, (t) = Taken By, (c) = Cosigned By    Initials Name Provider Type    Bailee Estrada, OTR Occupational Therapist              Therapy Education  Given: HEP, Symptoms/condition management, Edema management  Program: Reinforced, Progressed  How Provided: Verbal, Demonstration  Provided to: Patient  Level of Understanding: Verbalized, Demonstrated    OT Assessment/Plan     Row Name 10/06/21 2573          OT Assessment    Functional Limitations  Performance in leisure activities;Performance in self-care ADL;Performance in work activities;Limitation in home management  -EN     Impairments  Edema;Muscle strength;Pain;Range of motion  -EN     Assessment Comments  Patient with decreased L UE edema. BAPTISTE improved in  all digits since last week with exception of digit 4 which was the same. BAPTISTE in digit 2 improved from 86 to 103, digit 3 improved from 122 to 135, BAPTISTE of digit 4 was 122, and BAPTISTE of digit 5 improved from 95 to 121. Digit 1 MP extension iproved from -18 to -13 and IP flexion improved from 25 to 26 degrees.  strength improved from 5 pounds to 7 pounds. Wrist and forearm AROM is unchanged.  -EN       User Key  (r) = Recorded By, (t) = Taken By, (c) = Cosigned By    Initials Name Provider Type    Bailee Estrada OTR Occupational Therapist                 OT Exercises     Row Name 10/06/21 1100             Subjective Pain    Able to rate subjective pain?  yes  -EN      Pre-Treatment Pain Level  1  -EN      Post-Treatment Pain Level  1  -EN         Exercise 1    Exercise Name 1  retrograde edema massage- left elbow/forearm/wrist/hand  -EN         Exercise 2    Exercise Name 2  ROM exercises, elbow, forearm, wrist and hand  -EN         Exercise 3    Exercise Name 3  FMC activities  -EN         Exercise 4    Exercise Name 4  supination/pronation strengthening  -EN      Cueing 4  Verbal;Tactile  -EN      Equipment 4  Dumbell  -EN      Weights/Plates 4  3  -EN      Sets 4  2  -EN      Reps 4  20  -EN         Exercise 5    Exercise Name 5  wrist strenghtening- flexion/extension  -EN      Cueing 5  Verbal  -EN      Equipment 5  Dumbell  -EN      Weights/Plates 5  3  -EN      Sets 5  2  -EN      Reps 5  20  -EN         Exercise 6    Exercise Name 6  hand strengthening  -EN      Cueing 6  Verbal  -EN      Equipment 6  Hand Gripper  -EN      Weights/Plates 6  10  -EN      Sets 6  1  -EN      Reps 6  25  -EN      Time (Seconds) 6  3  -EN         Exercise 7    Exercise Name 7  hand strengthening- resistive pins  -EN      Resistance 7  Red  -EN      Sets 7  2  -EN         Exercise 8    Exercise Name 8  digi-flex  -EN      Cueing 8  Verbal  -EN      Resistance 8  Yellow  -EN      Sets 8  1  -EN      Reps 8  15  -EN       Time (Seconds) 8  3  -EN        User Key  (r) = Recorded By, (t) = Taken By, (c) = Cosigned By    Initials Name Provider Type    Bailee Estrada OTR Occupational Therapist        Plan- Continue OT 2 X per week X 4 weeks.                Time Calculation:   OT Start Time: 1054  OT Stop Time: 1209  OT Time Calculation (min): 75 min  Timed Charges  36458 - OT Therapeutic Activity Minutes: 60  Total Minutes  Timed Charges Total Minutes: 60   Total Minutes: 60     Therapy Charges for Today     Code Description Service Date Service Provider Modifiers Qty    27342513902 HC OT THERAPEUTIC ACT EA 15 MIN 10/6/2021 Bailee Casillas OTR GO 4    38760621027 HC OT HOT OR COLD PACK TREAT MCARE 10/6/2021 Bailee Casillas OTR GO 1                    KENNETH Chavez  10/6/2021

## 2021-10-11 ENCOUNTER — HOSPITAL ENCOUNTER (OUTPATIENT)
Dept: OCCUPATIONAL THERAPY | Facility: HOSPITAL | Age: 71
Setting detail: THERAPIES SERIES
Discharge: HOME | End: 2021-10-11

## 2021-10-11 NOTE — THERAPY TREATMENT NOTE
"Outpatient Occupational Therapy Ortho Treatment Note   [place]     Patient Name: Joan Block  : 1950  MRN: [id number]  Today's Date: 10/11/2021        Visit Date: 10/11/2021    Patient Active Problem List   Diagnosis   • Distal radial fracture   • Closed fracture of left distal radius   • Fracture, Colles, left, closed   • Essential hypertension   • Mixed hyperlipidemia        Past Medical History:   Diagnosis Date   • Arthritis of back 8 yrs ago   • Fracture of wrist 2021   • Fracture, radius 2021   • Fracture, ulna 2021   • Hyperlipidemia    • Hypertension    • Tendinitis of knee         Past Surgical History:   Procedure Laterality Date   • APPENDECTOMY     • FOOT SURGERY     • KNEE SURGERY     • ORIF ULNA/RADIUS FRACTURES Left 6/15/2021    Procedure: ULNA/RADIUS OPEN REDUCTION INTERNAL FIXATION;  Surgeon: Norman Brown MD;  Location: [facility];  Service: Orthopedics;  Laterality: Left;  ULNA/RADIAL OPEN REDUCTION INTERNAL FIXATION   • WRIST SURGERY           Visit Dx:  No diagnosis found.     OT Ortho     Row Name 10/11/21 1100             Subjective Pain    Able to rate subjective pain? yes  -EN      Pre-Treatment Pain Level 1  -EN      Post-Treatment Pain Level 1  -EN      Subjective Pain Comment Patient reports she used her hand alot this weekend cooking. Patient reports she wore her flexion glove a couple hours at a time. Reports she can wear finger extension splint(issued last week for PIP extension to rotate for digits 3 and 4) for brief periods of time, \"it starts hurting too bad after a few minutes.\"  -EN              Posture/Observations    Posture/Observations Comments Edema has decreased signifcantly. Size small edema glove issued to replace size medium. Patient reports she didn't take her ibuprohen or nerve medicine this weekend, reported her hand is a little more sensitive today. Encouraged patient to follow MD orders for meds for full potential to " improved with hand and wrist ROM.  -EN              Sensation    Additional Comments Patient reports her hand is sensitive to cold and hot.  -EN              Left Upper Ext    Lt Elbow Extension/Flexion AROM -  -EN      Lt Wrist Flexion AROM 26  -EN      Lt Wrist Flexion PROM 35  -EN      Lt Wrist Extension AROM 45  -EN      Lt Wrist Extension PROM 50  -EN              LUE Edema - Circumference (cm)    Wrist Crease 15.5 cm  -EN            User Key  (r) = Recorded By, (t) = Taken By, (c) = Cosigned By    Initials Name Provider Type    Bailee Estrada OTR Occupational Therapist                        Therapy Education  Education Details: splint schedule  Given: HEP, Symptoms/condition management, Edema management  Program: Reinforced, Progressed  How Provided: Verbal, Demonstration  Provided to: Patient  Level of Understanding: Verbalized, Demonstrated     OT Assessment/Plan     Row Name 10/11/21 1252          OT Assessment    Functional Limitations Performance in leisure activities; Performance in self-care ADL; Performance in work activities; Limitation in home management  -EN     Impairments Edema; Muscle strength; Pain; Range of motion  -EN     Assessment Comments Patient demonstrated improved elbow AROM and edema has decreased significantly in the wrist. Patient issued size small edema glove to replace medium. Fabricated splint for night wear (resting hand for improved finger extension).Pt reports digits 3 and 4 more flexed in the morning and difficult to extend. Patient encouraged to continue to wear flexion glove some during the day and HEP reinforced.  -EN            OT Plan    OT Plan Comments Continue with goals.  -EN           User Key  (r) = Recorded By, (t) = Taken By, (c) = Cosigned By    Initials Name Provider Type    Bailee Estrada OTR Occupational Therapist                        OT Exercises     Row Name 10/11/21 1100             Exercise 1    Exercise Name 1 retrograde edema  massage- left elbow/forearm/wrist/hand  -EN              Exercise 2    Exercise Name 2 ROM exercises, elbow, forearm, wrist and hand  -EN              Exercise 3    Exercise Name 3 FMC activities  -EN              Exercise 4    Exercise Name 4 supination/pronation strengthening  -EN      Cueing 4 Verbal; Tactile  -EN      Equipment 4 Dumbell  -EN      Weights/Plates 4 3  -EN      Sets 4 2  -EN      Reps 4 20  -EN              Exercise 5    Exercise Name 5 wrist strenghtening- flexion/extension  -EN      Cueing 5 Verbal  -EN      Equipment 5 Dumbell  -EN      Weights/Plates 5 3  -EN      Sets 5 2  -EN      Reps 5 20  -EN              Exercise 8    Exercise Name 8 digi-flex  -EN      Cueing 8 Verbal  -EN      Resistance 8 Yellow  -EN      Sets 8 1  -EN      Reps 8 15  -EN      Time (Seconds) 8 3  -EN            User Key  (r) = Recorded By, (t) = Taken By, (c) = Cosigned By    Initials Name Provider Type    Bailee Estrada OTR Occupational Therapist                              Time Calculation:   OT Start Time: 1045  OT Stop Time: 1200  OT Time Calculation (min): 75 min  Timed Charges  09342 - OT Therapeutic Activity Minutes: 30  Total Minutes  Timed Charges Total Minutes: 30   Total Minutes: 30                   KENNETH Chavez  10/11/2021

## 2021-10-13 ENCOUNTER — HOSPITAL ENCOUNTER (OUTPATIENT)
Dept: OCCUPATIONAL THERAPY | Facility: HOSPITAL | Age: 71
Setting detail: THERAPIES SERIES
Discharge: HOME | End: 2021-10-13

## 2021-10-13 DIAGNOSIS — S52.532D CLOSED COLLES' FRACTURE OF LEFT RADIUS WITH ROUTINE HEALING, SUBSEQUENT ENCOUNTER: Primary | ICD-10-CM

## 2021-10-13 PROCEDURE — 97530 THERAPEUTIC ACTIVITIES: CPT

## 2021-10-13 NOTE — THERAPY TREATMENT NOTE
"Outpatient Occupational Therapy Ortho Treatment Note   Norah Tejeda     Patient Name: Joan Block  : 1950  MRN: [id number]  Today's Date: 10/13/2021        Visit Date: 10/13/2021    Patient Active Problem List   Diagnosis   • Distal radial fracture   • Closed fracture of left distal radius   • Fracture, Colles, left, closed   • Essential hypertension   • Mixed hyperlipidemia        Past Medical History:   Diagnosis Date   • Arthritis of back 8 yrs ago   • Fracture of wrist 2021   • Fracture, radius 2021   • Fracture, ulna 2021   • Hyperlipidemia    • Hypertension    • Tendinitis of knee         Past Surgical History:   Procedure Laterality Date   • APPENDECTOMY     • FOOT SURGERY     • KNEE SURGERY     • ORIF ULNA/RADIUS FRACTURES Left 6/15/2021    Procedure: ULNA/RADIUS OPEN REDUCTION INTERNAL FIXATION;  Surgeon: Norman Brown MD;  Location: [facility];  Service: Orthopedics;  Laterality: Left;  ULNA/RADIAL OPEN REDUCTION INTERNAL FIXATION   • WRIST SURGERY           Visit Dx:    ICD-10-CM ICD-9-CM   1. Closed Colles' fracture of left radius with routine healing, subsequent encounter  S52.532D V54.12        OT Ortho     Row Name 10/13/21 1300             Subjective Pain    Able to rate subjective pain? yes  -EN      Pre-Treatment Pain Level 0  -EN      Post-Treatment Pain Level 0  -EN      Subjective Pain Comment Patient reports she has been using her hand outside a lot working in the yard. Patient reports it was sore the past couple days but feels better today. Reports she thinks the night splint helped finger extension a lot, \"It wasn't all bunched up when I woke up in the morning.\"  -EN              Posture/Observations    Posture/Observations Comments Improved finger extension, improved pronation. Edema decreasing in fingers.  -EN              Left Upper Ext    Lt Elbow Extension/Flexion AROM -20  -EN      Lt Elbow Pronation AROM 70  -EN      Lt Wrist Flexion AROM 26  -EN "      Lt Wrist Extension AROM 46  -EN            User Key  (r) = Recorded By, (t) = Taken By, (c) = Cosigned By    Initials Name Provider Type    Bailee Estrada OTR Occupational Therapist                Hand Therapy (last 24 hours)     Hand Eval     Row Name 10/13/21 1300             Left Extension AROM    II- MP AROM -12  -EN      II- PIP AROM -23  -EN      II- DIP AROM -5  -EN      II- BAPTISTE Left Extension AROM -40  -EN      III- MP AROM -4  -EN      III- PIP AROM -49  PROM 40 degrees  -EN      III- DIP AROM -25  -EN      III- BAPTISTE Left Extension AROM -78  -EN      IV- MP AROM -14  -EN      IV- PIP AROM -46  PROM 40  -EN      IV- DIP AROM -24  -EN      IV- BAPTISTE Left Extension AROM -84  -EN      V- MP AROM 0  -EN      V- PIP AROM -31  -EN      V- DIP AROM -22  -EN      V- BAPTISTE Left Extension AROM -53  -EN              Left Flexion AROM    II- MP AROM 50  -EN      II- PIP AROM 71  -EN      II- DIP AROM 30  -EN      II- BAPTISTE Left Flexion AROM 151  -EN      III- MP AROM 50  -EN      III- PIP AROM 93  -EN      III- DIP AROM 56  -EN      III- BAPTISTE Left Flexion AROM 199  -EN      IV- MP AROM 56  -EN      IV- PIP AROM 98  -EN      IV- DIP AROM 61  -EN      IV- BAPTISTE Left Flexion AROM 215  -EN      V- MP AROM 37  -EN      V- PIP AROM 71  -EN      V- DIP AROM 55  -EN      V- BAPTISTE Left Flexion AROM 163  -EN              Left Thumb AROM    MP Flexion - AROM 51  -EN      MP Extension- AROM -11  -EN      IP Flexion - AROM 26  -EN            User Key  (r) = Recorded By, (t) = Taken By, (c) = Cosigned By    Initials Name Provider Type    EN Bailee Casillas OTR Occupational Therapist                          OT Assessment/Plan     Row Name 10/13/21 1408          OT Assessment    Functional Limitations Performance in leisure activities; Performance in self-care ADL; Performance in work activities; Limitation in home management  -EN     Impairments Edema; Muscle strength; Pain; Range of motion  -EN     Assessment Comments  Patient with significant improvement with forearm pronation. Slight improvement with wrist extension. Patient reports she is using her hand so much lately in the yard and with IADLs. Patient reports main difficulty she continues to have is opening containers/jars and handwriting. Patient reports built up pen is helpful. Educated patient on compensatory strategies for opening lids/caps, etc... Patient had improvement in BAPTISTE in digit 1 and 4 this week. Quick Dash score has improved 32 points.  -EN           User Key  (r) = Recorded By, (t) = Taken By, (c) = Cosigned By    Initials Name Provider Type    Bailee Estrada OTR Occupational Therapist                  OT Goals     Row Name 10/13/21 1300          OT Short Term Goals    STG Date to Achieve 10/27/21  -EN     STG 1 Patient will demonstrate independence with HEP including ROM, edema management and scar care.  -EN     STG 1 Progress Met  -EN     STG 2 Patient will improve BAPTISTE in left digits by 25 degrees for improved grasp.  -EN     STG 2 Progress Met  -EN     STG 3 Patient will improve active L wrist flexion and extension by 15 degress.  -EN     STG 3 Progress Met  -EN     STG 4 Patient will improve L forearm active pronation by 20 degrees.  -EN     STG 4 Progress Met  -EN     STG 5 Patient will report 50% less pain during activity.  -EN     STG 5 Progress Met  -EN     STG 6 Patient will improve left elbow AROM to WFL.  -EN     STG 6 Progress Ongoing; Progressing  -EN            Long Term Goals    LTG Date to Achieve 10/29/21  -EN     LTG 1 Patient will demonstrate WFL left hand/wrist/forearm/elbow  AROM.  -EN     LTG 1 Progress Ongoing; Progressing  -EN     LTG 2 Patient will demonstrate WFL strength in L UE.  -EN     LTG 2 Progress Ongoing  -EN     LTG 3 Patient will report pain 1/10 or less during activity.  -EN     LTG 3 Progress Met  -EN     LTG 4 Patient will report independence with all ADL/IADLs.  -EN     LTG 4 Progress Ongoing; Progressing  -EN      LTG 4 Progress Comments Patient reports she is doing everything independently with exception of opening jars. Reports increased time required. Educated patient on compensatory techniques for opening jars/lids/caps.  -EN           User Key  (r) = Recorded By, (t) = Taken By, (c) = Cosigned By    Initials Name Provider Type    Bailee Estrada OTR Occupational Therapist                 Modalities     Row Name 10/13/21 1300             Moist Heat    MH Applied Yes  -EN      Location left hand/wrist  -EN      OT Moist Heat Minutes 15  -EN      MH Prior to Rx Yes  -EN            User Key  (r) = Recorded By, (t) = Taken By, (c) = Cosigned By    Initials Name Provider Type    Bailee Estrada OTR Occupational Therapist                    Quick DASH  Open a tight or new jar.: Unable  Do heavy household chores (e.g., wash walls, wash floors): Moderate Difficulty  Carry a shopping bag or briefcase: Moderate Difficulty  Wash your back: Moderate Difficulty  Use a knife to cut food: Mild Difficulty  Recreational activities in which you take some force or impact through your arm, should or hand (e.g. golf, hammering, tennis, etc.): Unable  During the past week, to what extent has your arm, shoulder, or hand problem interfered with your normal social activites with family, friends, neighbors or groups?: Not at all  During the past week, were you limited in your work or other regular daily activities as a result of your arm, shoulder or hand problem?: Slightly Limited  Arm, Shoulder, or hand pain: Mild  Tingling (pins and needles) in your arm, shoulder, or hand: Mild  During the past week, how much difficulty have you had sleeping because of the pain in your arm, shoulder or hand?: Mild Difficulty  Number of Questions Answered: 11  Quick DASH Score: 43.18           Time Calculation:   OT Start Time: 1146  OT Stop Time: 1303  OT Time Calculation (min): 77 min  Timed Charges  16334 - OT Therapeutic Activity  Minutes: 30  Untimed Charges  OT Moist Heat Minutes: 15  Total Minutes  Timed Charges Total Minutes: 30  Untimed Charges Total Minutes: 15   Total Minutes: 30     Therapy Charges for Today     Code Description Service Date Service Provider Modifiers Qty    22878633403 HC OT THERAPEUTIC ACT EA 15 MIN 10/13/2021 Bailee Casillas OTR GO 2    56144485025 HC OT HOT OR COLD PACK TREAT MCARE 10/13/2021 Bailee Casillas OTR GO 1                    KENNETH Chavez  10/13/2021

## 2021-10-14 ENCOUNTER — OFFICE VISIT (OUTPATIENT)
Dept: INTERNAL MEDICINE | Facility: CLINIC | Age: 71
End: 2021-10-14

## 2021-10-14 VITALS
HEIGHT: 62 IN | HEART RATE: 82 BPM | WEIGHT: 187.6 LBS | RESPIRATION RATE: 20 BRPM | TEMPERATURE: 97.7 F | BODY MASS INDEX: 34.52 KG/M2 | DIASTOLIC BLOOD PRESSURE: 80 MMHG | SYSTOLIC BLOOD PRESSURE: 132 MMHG | OXYGEN SATURATION: 98 %

## 2021-10-14 DIAGNOSIS — Z00.00 ENCOUNTER FOR MEDICARE ANNUAL WELLNESS EXAM: Primary | ICD-10-CM

## 2021-10-14 DIAGNOSIS — Z23 NEED FOR PNEUMOCOCCAL VACCINATION: ICD-10-CM

## 2021-10-14 DIAGNOSIS — Z12.11 SCREENING FOR MALIGNANT NEOPLASM OF COLON: ICD-10-CM

## 2021-10-14 DIAGNOSIS — Z78.0 POST-MENOPAUSAL: ICD-10-CM

## 2021-10-14 DIAGNOSIS — I10 ESSENTIAL HYPERTENSION: ICD-10-CM

## 2021-10-14 DIAGNOSIS — E78.2 MIXED HYPERLIPIDEMIA: ICD-10-CM

## 2021-10-14 PROCEDURE — G0439 PPPS, SUBSEQ VISIT: HCPCS | Performed by: NURSE PRACTITIONER

## 2021-10-14 PROCEDURE — 90732 PPSV23 VACC 2 YRS+ SUBQ/IM: CPT | Performed by: NURSE PRACTITIONER

## 2021-10-14 PROCEDURE — G0009 ADMIN PNEUMOCOCCAL VACCINE: HCPCS | Performed by: NURSE PRACTITIONER

## 2021-10-14 PROCEDURE — 1170F FXNL STATUS ASSESSED: CPT | Performed by: NURSE PRACTITIONER

## 2021-10-14 PROCEDURE — 1126F AMNT PAIN NOTED NONE PRSNT: CPT | Performed by: NURSE PRACTITIONER

## 2021-10-14 PROCEDURE — 1160F RVW MEDS BY RX/DR IN RCRD: CPT | Performed by: NURSE PRACTITIONER

## 2021-10-14 NOTE — PROGRESS NOTES
The ABCs of the Annual Wellness Visit  Subsequent Medicare Wellness Visit    Chief Complaint   Patient presents with   • Medicare Wellness-subsequent   • Follow-up   • Hypertension   • Hyperlipidemia       Subjective   History of Present Illness:  Joan Block is a 71 y.o. female who presents for a Subsequent Medicare Wellness Visit as well as follow up of her chronic health conditions.        Patient Active Problem List   Diagnosis   • Distal radial fracture   • Closed fracture of left distal radius   • Fracture, Colles, left, closed   • Essential hypertension   • Mixed hyperlipidemia       Outpatient Medications Prior to Visit   Medication Sig Dispense Refill   • atorvastatin (LIPITOR) 10 MG tablet Take 10 mg by mouth Daily.     • lisinopril (PRINIVIL,ZESTRIL) 10 MG tablet Take 10 mg by mouth Daily.     • pregabalin (Lyrica) 75 MG capsule Take 1 capsule by mouth 2 (Two) Times a Day. 60 capsule 0     No facility-administered medications prior to visit.        Chronic Health Conditions Addressed in this Visit:  Mrs. Block has HTN and HLD. She takes Lisinopril and Lipitor. She tolerates these well. She does not self monitor BP. She denies CP, SOB, cough, HA or dizziness.      HEALTH RISK ASSESSMENT    Recent Hospitalizations:  No    Current Medical Providers:  Patient Care Team:  Josie Del Valle APRN as PCP - General (Family Medicine)  Norman Brown MD as Surgeon (Orthopedic Surgery)  Kassi Rios APRN (Dermatology)    Smoking Status:  Social History     Tobacco Use   Smoking Status Former Smoker   • Packs/day: 0.50   • Years: 15.00   • Pack years: 7.50   • Types: Cigarettes   • Start date: 1970   • Quit date: 10/1/1985   • Years since quittin.0   Smokeless Tobacco Never Used       Alcohol Consumption:  Social History     Substance and Sexual Activity   Alcohol Use No       Depression Screen:   PHQ-2/PHQ-9 Depression Screening 10/14/2021   Little interest or pleasure in doing things 0    Feeling down, depressed, or hopeless 0   Total Score 0       Fall Risk Screen:  CHOLO Fall Risk Assessment was completed, and patient is at HIGH risk for falls. Assessment completed on:9/13/2021    Health Habits and Functional and Cognitive Screening:  Functional & Cognitive Status 10/14/2021   Do you have difficulty preparing food and eating? No   Do you have difficulty bathing yourself, getting dressed or grooming yourself? No   Do you have difficulty using the toilet? No   Do you have difficulty moving around from place to place? No   Do you have trouble with steps or getting out of a bed or a chair? No   Current Diet Well Balanced Diet   Dental Exam Not up to date   Eye Exam Up to date   Exercise (times per week) 0 times per week   Current Exercises Include No Regular Exercise   Do you need help using the phone?  No   Are you deaf or do you have serious difficulty hearing?  No   Do you need help with transportation? No   Do you need help shopping? No   Do you need help preparing meals?  No   Do you need help with housework?  No   Do you need help with laundry? No   Do you need help taking your medications? No   Do you need help managing money? No   Do you ever drive or ride in a car without wearing a seat belt? No   Have you felt unusual stress, anger or loneliness in the last month? No   Who do you live with? Spouse   If you need help, do you have trouble finding someone available to you? No   Have you been bothered in the last four weeks by sexual problems? No   Do you have difficulty concentrating, remembering or making decisions? No         Does the patient have evidence of cognitive impairment? No    Asprin use counseling:Does not need ASA (and currently is not on it)    Age-appropriate Screening Schedule:  Refer to the list below for future screening recommendations based on patient's age, sex and/or medical conditions. Orders for these recommended tests are listed in the plan section. The patient has  "been provided with a written plan.    Health Maintenance   Topic Date Due   • DXA SCAN  Never done   • TDAP/TD VACCINES (1 - Tdap) Never done   • ZOSTER VACCINE (1 of 2) Never done   • MAMMOGRAM  07/29/2022   • LIPID PANEL  10/12/2022   • INFLUENZA VACCINE  Completed          The following portions of the patient's history were reviewed and updated as appropriate: allergies, current medications, past family history, past medical history, past social history, past surgical history, and problem list.      Compared to one year ago, the patient feels her physical health is the same.  Compared to one year ago, the patient feels her mental health is the same.    Reviewed chart for potential of high risk medication in the elderly: Yes  Reviewed chart for potential of harmful drug interactions in the elderly:Yes      Review of Systems   Constitutional: Negative.    HENT: Negative.    Eyes: Negative.    Respiratory: Negative.    Cardiovascular: Negative.    Gastrointestinal: Negative.    Endocrine: Negative.    Genitourinary: Negative.    Musculoskeletal: Negative.    Skin: Negative.    Allergic/Immunologic: Negative.    Neurological: Negative.    Hematological: Negative.    Psychiatric/Behavioral: Negative.          Objective         Vitals:    10/14/21 0845   BP: 132/80   Pulse: 82   Resp: 20   Temp: 97.7 °F (36.5 °C)   TempSrc: Temporal   SpO2: 98%   Weight: 85.1 kg (187 lb 9.6 oz)   Height: 157.5 cm (62.01\")       Body mass index is 34.3 kg/m².  Discussed the patient's BMI with her. The BMI is above average; BMI management plan is completed.  Patient's Body mass index is 34.3 kg/m². indicating that she is obese (BMI >30). Obesity-related health conditions include the following: hypertension and diabetes mellitus. Obesity is newly identified. BMI is is above average; BMI management plan is completed. We discussed low calorie, low carb based diet program, portion control and increasing exercise..        Physical " Exam  Constitutional:       General: She is not in acute distress.     Appearance: Normal appearance. She is well-developed.   HENT:      Head: Normocephalic.      Right Ear: Hearing, tympanic membrane, ear canal and external ear normal.      Left Ear: Hearing, tympanic membrane, ear canal and external ear normal.      Nose: Nose normal. No mucosal edema or rhinorrhea.      Mouth/Throat:      Mouth: Mucous membranes are moist.      Pharynx: Oropharynx is clear. Uvula midline.   Eyes:      General: Lids are normal.      Extraocular Movements: Extraocular movements intact.      Conjunctiva/sclera: Conjunctivae normal.      Pupils: Pupils are equal, round, and reactive to light.   Neck:      Thyroid: No thyroid mass or thyromegaly.   Cardiovascular:      Rate and Rhythm: Regular rhythm.      Pulses: Normal pulses.      Heart sounds: S1 normal and S2 normal. No murmur heard.  No friction rub. No gallop.    Pulmonary:      Effort: Pulmonary effort is normal.      Breath sounds: Normal breath sounds. No wheezing, rhonchi or rales.   Abdominal:      General: Bowel sounds are normal.      Palpations: Abdomen is soft.      Tenderness: There is no abdominal tenderness. There is no guarding.      Hernia: No hernia is present.   Musculoskeletal:         General: No deformity. Normal range of motion.      Cervical back: Normal range of motion and neck supple.   Lymphadenopathy:      Cervical: No cervical adenopathy.   Skin:     General: Skin is warm and dry.      Findings: No lesion or rash.   Neurological:      General: No focal deficit present.      Mental Status: She is alert and oriented to person, place, and time.      Cranial Nerves: No cranial nerve deficit.      Sensory: No sensory deficit.      Motor: Motor function is intact.      Coordination: Coordination is intact.      Gait: Gait normal.      Deep Tendon Reflexes: Reflexes are normal and symmetric.   Psychiatric:         Attention and Perception: She is attentive.          Mood and Affect: Mood and affect normal.         Speech: Speech normal.         Behavior: Behavior normal.         Thought Content: Thought content normal.         Recent Results (from the past 336 hour(s))   CBC (No Diff)    Collection Time: 10/12/21  9:06 AM    Specimen: Blood   Result Value Ref Range    WBC 7.15 3.40 - 10.80 10*3/mm3    RBC 4.78 3.77 - 5.28 10*6/mm3    Hemoglobin 13.6 12.0 - 15.9 g/dL    Hematocrit 42.2 34.0 - 46.6 %    MCV 88.3 79.0 - 97.0 fL    MCH 28.5 26.6 - 33.0 pg    MCHC 32.2 31.5 - 35.7 g/dL    RDW 13.2 12.3 - 15.4 %    Platelets 239 140 - 450 10*3/mm3   Comprehensive Metabolic Panel    Collection Time: 10/12/21  9:06 AM    Specimen: Blood   Result Value Ref Range    Glucose 96 65 - 99 mg/dL    BUN 17 8 - 23 mg/dL    Creatinine 0.81 0.57 - 1.00 mg/dL    eGFR Non African Am 70 >60 mL/min/1.73    eGFR African Am 85 >60 mL/min/1.73    BUN/Creatinine Ratio 21.0 7.0 - 25.0    Sodium 142 136 - 145 mmol/L    Potassium 4.9 3.5 - 5.2 mmol/L    Chloride 105 98 - 107 mmol/L    Total CO2 24.5 22.0 - 29.0 mmol/L    Calcium 10.1 8.6 - 10.5 mg/dL    Total Protein 6.5 6.0 - 8.5 g/dL    Albumin 4.40 3.50 - 5.20 g/dL    Globulin 2.1 gm/dL    A/G Ratio 2.1 g/dL    Total Bilirubin 0.4 0.0 - 1.2 mg/dL    Alkaline Phosphatase 70 39 - 117 U/L    AST (SGOT) 16 1 - 32 U/L    ALT (SGPT) 17 1 - 33 U/L   Hepatitis C Antibody    Collection Time: 10/12/21  9:06 AM    Specimen: Blood   Result Value Ref Range    Hep C Virus Ab <0.1 0.0 - 0.9 s/co ratio   Lipid Panel With / Chol / HDL Ratio    Collection Time: 10/12/21  9:06 AM    Specimen: Blood   Result Value Ref Range    Total Cholesterol 181 0 - 200 mg/dL    Triglycerides 82 0 - 150 mg/dL    HDL Cholesterol 51 40 - 60 mg/dL    VLDL Cholesterol Zachariah 15 5 - 40 mg/dL    LDL Chol Calc (NIH) 115 (H) 0 - 100 mg/dL    Chol/HDL Ratio 3.55    Thyroid Assumption Profile    Collection Time: 10/12/21  9:06 AM    Specimen: Blood   Result Value Ref Range    TSH 1.990 0.450 -  4.500 uIU/mL   Urinalysis With Culture If Indicated - Urine, Clean Catch    Collection Time: 10/12/21  9:06 AM    Specimen: Urine, Clean Catch   Result Value Ref Range    Specific Gravity, UA 1.006 1.005 - 1.030    pH, UA 6.0 5.0 - 7.5    Color, UA Yellow Yellow    Appearance, UA Clear Clear    Leukocytes, UA 2+ (A) Negative    Protein Negative Negative/Trace    Glucose, UA Negative Negative    Ketones Negative Negative    Blood, UA Negative Negative    Bilirubin, UA Negative Negative    Urobilinogen, UA 0.2 0.2 - 1.0 mg/dL    Nitrite, UA Negative Negative    Microscopic Examination See below:     Urinalysis Reflex Comment    Microscopic Examination -    Collection Time: 10/12/21  9:06 AM   Result Value Ref Range    WBC, UA 0-5 0 - 5 /hpf    RBC, UA None seen 0 - 2 /hpf    Epithelial Cells (non renal) 0-10 0 - 10 /hpf    Casts None seen None seen /lpf    Bacteria, UA None seen None seen/Few /hpf   Urine culture, Comprehensive - ,    Collection Time: 10/12/21  9:06 AM   Result Value Ref Range    Urine Culture Final report     Result 1 Comment        Each of these lab results were discussed individually in detail with the patient.      Assessment/Plan     Diagnoses and all orders for this visit:    1. Encounter for Medicare annual wellness exam (Primary)    2. Essential hypertension  Comments:  - controlled    3. Mixed hyperlipidemia  Comments:  - I would like to see LDL lower. Has had issues w/ myalgias from statiinin past. Will cont current dose and focus on TLCs  Orders:  -     Comprehensive Metabolic Panel; Future  -     Lipid Panel With / Chol / HDL Ratio; Future    4. Post-menopausal  -     DEXA Bone Density Axial; Future    5. Need for pneumococcal vaccination  -     Pneumococcal Polysaccharide Vaccine 23-Valent Greater Than or Equal To 3yo Subcutaneous / IM    6. Screening for malignant neoplasm of colon  -     Cologuard - Stool, Per Rectum; Future        Medicare Risks and Personalized Health Plan    Advanced  Care Planning:  ACP discussion was held with the patient during this visit. Patient does not have an advance directive, declines further assistance.    CMS Preventative Services Quick Reference  Advance Directive Discussion  Breast Cancer/Mammogram Screening  Cardiovascular risk  Colon Cancer Screening  Diabetic Lab Screening   Immunizations Discussed/Encouraged (specific immunizations; Tdap, Pneumococcal 23 and COVID19 )  Osteoporosis Risk        The above risks/problems have been discussed with the patient.  Pertinent information has been shared with the patient in the After Visit Summary.  Follow up plans and orders are seen below in the Assessment/Plan Section.      Follow Up:  Return in about 3 months (around 1/14/2022) for fasting labs one week  prior to., sooner if needed.    An After Visit Summary and PPPS were given to the patient.

## 2021-10-18 ENCOUNTER — HOSPITAL ENCOUNTER (OUTPATIENT)
Dept: OCCUPATIONAL THERAPY | Facility: HOSPITAL | Age: 71
Setting detail: THERAPIES SERIES
Discharge: HOME | End: 2021-10-18

## 2021-10-18 DIAGNOSIS — S52.532D CLOSED COLLES' FRACTURE OF LEFT RADIUS WITH ROUTINE HEALING, SUBSEQUENT ENCOUNTER: Primary | ICD-10-CM

## 2021-10-18 PROCEDURE — 97530 THERAPEUTIC ACTIVITIES: CPT

## 2021-10-18 NOTE — THERAPY TREATMENT NOTE
Outpatient Occupational Therapy Ortho Treatment Note  TIFFANIE Lima     Patient Name: Joan Block  : 1950  MRN: [id number]  Today's Date: 10/18/2021        Visit Date: 10/18/2021    Patient Active Problem List   Diagnosis   • Distal radial fracture   • Closed fracture of left distal radius   • Fracture, Colles, left, closed   • Essential hypertension   • Mixed hyperlipidemia        Past Medical History:   Diagnosis Date   • Arthritis of back 8 yrs ago   • Fracture of wrist 2021   • Fracture, radius 2021   • Fracture, ulna 2021   • Hyperlipidemia    • Hypertension    • Tendinitis of knee         Past Surgical History:   Procedure Laterality Date   • APPENDECTOMY     • FOOT SURGERY     • KNEE SURGERY     • ORIF ULNA/RADIUS FRACTURES Left 6/15/2021    Procedure: ULNA/RADIUS OPEN REDUCTION INTERNAL FIXATION;  Surgeon: Norman Brown MD;  Location: [facility];  Service: Orthopedics;  Laterality: Left;  ULNA/RADIAL OPEN REDUCTION INTERNAL FIXATION   • WRIST SURGERY           Visit Dx:    ICD-10-CM ICD-9-CM   1. Closed Colles' fracture of left radius with routine healing, subsequent encounter  S52.532D V54.12        OT Ortho     Row Name 10/18/21 1100             Subjective Pain    Able to rate subjective pain? yes  -EN      Pre-Treatment Pain Level 0  -EN      Post-Treatment Pain Level 0  -EN      Subjective Pain Comment Patient reports pain with certain activitie.  -EN              Posture/Observations    Posture/Observations Comments decreased edema throughout L UE  -EN            User Key  (r) = Recorded By, (t) = Taken By, (c) = Cosigned By    Initials Name Provider Type    EN Bailee Casillas, OTR Occupational Therapist                Hand Therapy (last 24 hours)     Hand Eval     Row Name 10/18/21 1100             Left Extension AROM    IV- PIP AROM -45  PROM 40  -EN              Pinch Strength    Affected Side Left  -EN              Right Hand Strength - Pinch (lbs)     Lateral 12 lbs  -EN              Left Hand Strength - Pinch (lbs)    Lateral 5 lbs  -EN            User Key  (r) = Recorded By, (t) = Taken By, (c) = Cosigned By    Initials Name Provider Type    Bailee Estrada OTR Occupational Therapist                          OT Assessment/Plan     Row Name 10/18/21 1342          OT Assessment    Functional Limitations Performance in leisure activities; Performance in self-care ADL; Performance in work activities; Limitation in home management  -EN     Impairments Edema; Muscle strength; Pain; Range of motion  -EN     Assessment Comments Edema has decreased significantly the past couple weeks. Patient reports she is using her hand much more at home and woking in the yard. Patient continues to have difficulty with grasp/picking up small objects and with writing but is making some progress with these tasks using compensatory strategies. Patient has an MD appointment 10/21.  -EN            OT Plan    OT Plan Comments Continue with goals, MD appointment 10/21.  -EN           User Key  (r) = Recorded By, (t) = Taken By, (c) = Cosigned By    Initials Name Provider Type    Bailee Estrada OTABRAHAM Occupational Therapist                  OT Goals     Row Name 10/18/21 1100          OT Short Term Goals    STG Date to Achieve 10/27/21  -EN     STG 1 Patient will demonstrate independence with HEP including ROM, edema management and scar care.  -EN     STG 1 Progress Met  -EN     STG 2 Patient will improve BAPTISTE in left digits by 25 degrees for improved grasp.  -EN     STG 2 Progress Met  -EN     STG 3 Patient will improve active L wrist flexion and extension by 15 degress.  -EN     STG 3 Progress Met  -EN     STG 4 Patient will improve L forearm active pronation by 20 degrees.  -EN     STG 4 Progress Met  -EN     STG 5 Patient will report 50% less pain during activity.  -EN     STG 5 Progress Met  -EN     STG 6 Patient will improve left elbow AROM to WFL.  -EN     STG 6 Progress  Ongoing; Progressing  -EN            Long Term Goals    LTG Date to Achieve 10/29/21  -EN     LTG 1 Patient will demonstrate WFL left hand/wrist/forearm/elbow  AROM.  -EN     LTG 1 Progress Ongoing; Progressing  -EN     LTG 2 Patient will demonstrate WFL strength in L UE.  -EN     LTG 2 Progress Ongoing  -EN     LTG 3 Patient will report pain 1/10 or less during activity.  -EN     LTG 3 Progress Met  -EN     LTG 4 Patient will report independence with all ADL/IADLs.  -EN     LTG 4 Progress Ongoing; Progressing  -EN           User Key  (r) = Recorded By, (t) = Taken By, (c) = Cosigned By    Initials Name Provider Type    Bailee Estrada OTR Occupational Therapist                   OT Exercises     Row Name 10/18/21 1100             Total Minutes    10384 - OT Therapeutic Activity Minutes 30  -EN              Exercise 1    Exercise Name 1 retrograde edema massage- left elbow/forearm/wrist/hand  -EN              Exercise 2    Exercise Name 2 ROM exercises, elbow, forearm, wrist and hand  -EN              Exercise 3    Exercise Name 3 FMC activities  -EN              Exercise 4    Exercise Name 4 supination/pronation strengthening  -EN      Cueing 4 Verbal; Tactile  -EN      Equipment 4 Dumbell  -EN      Weights/Plates 4 3  -EN      Sets 4 2  -EN      Reps 4 20  -EN              Exercise 5    Exercise Name 5 wrist strenghtening- flexion/extension  -EN      Cueing 5 Verbal  -EN      Equipment 5 Dumbell  -EN      Weights/Plates 5 3  one set with 2 pounds, one with 3 pounds  -EN      Sets 5 2  -EN      Reps 5 20  -EN              Exercise 6    Exercise Name 6 Resistive pins  -EN      Resistance 6 Yellow; Red; Green  -EN              Exercise 7    Exercise Name 7 hand strengthening- yellow digit flex  -EN              Exercise 8    Exercise Name 8 hand strengthening- gripper  -EN      Weights/Plates 8 20  -EN      Sets 8 2  -EN      Reps 8 15  -EN            User Key  (r) = Recorded By, (t) = Taken By, (c) =  Cosigned By    Initials Name Provider Type    EN Bailee Casillas OTR Occupational Therapist                              Time Calculation:   OT Start Time: 1042  OT Stop Time: 1200  OT Time Calculation (min): 78 min  Timed Charges  73230 - OT Therapeutic Activity Minutes: 30  Total Minutes  Timed Charges Total Minutes: 30   Total Minutes: 30     Therapy Charges for Today     Code Description Service Date Service Provider Modifiers Qty    82548740896 HC OT THERAPEUTIC ACT EA 15 MIN 10/18/2021 Bailee Casillas OTR GO 2    91685102169 HC OT HOT OR COLD PACK TREAT MCARE 10/18/2021 Bailee Casillas OTR GO 1                    KENNETH Chavez  10/18/2021

## 2021-10-19 ENCOUNTER — HOSPITAL ENCOUNTER (OUTPATIENT)
Dept: MAMMOGRAPHY | Facility: HOSPITAL | Age: 71
Discharge: HOME | End: 2021-10-19
Admitting: NURSE PRACTITIONER

## 2021-10-19 DIAGNOSIS — Z12.31 VISIT FOR SCREENING MAMMOGRAM: ICD-10-CM

## 2021-10-19 PROCEDURE — 77063 BREAST TOMOSYNTHESIS BI: CPT

## 2021-10-19 PROCEDURE — 77067 SCR MAMMO BI INCL CAD: CPT

## 2021-10-20 ENCOUNTER — HOSPITAL ENCOUNTER (OUTPATIENT)
Dept: OCCUPATIONAL THERAPY | Facility: HOSPITAL | Age: 71
Setting detail: THERAPIES SERIES
Discharge: HOME | End: 2021-10-20

## 2021-10-20 DIAGNOSIS — S52.532D CLOSED COLLES' FRACTURE OF LEFT RADIUS WITH ROUTINE HEALING, SUBSEQUENT ENCOUNTER: Primary | ICD-10-CM

## 2021-10-20 PROCEDURE — 97530 THERAPEUTIC ACTIVITIES: CPT

## 2021-10-20 NOTE — THERAPY TREATMENT NOTE
"Outpatient Occupational Therapy Ortho Treatment Note   Norah Tejeda     Patient Name: Joan Block  : 1950  MRN: [id number]  Today's Date: 10/20/2021        Visit Date: 10/20/2021    Patient Active Problem List   Diagnosis   • Distal radial fracture   • Closed fracture of left distal radius   • Fracture, Colles, left, closed   • Essential hypertension   • Mixed hyperlipidemia        Past Medical History:   Diagnosis Date   • Arthritis of back 8 yrs ago   • Fracture of wrist 2021   • Fracture, radius 2021   • Fracture, ulna 2021   • Hyperlipidemia    • Hypertension    • Tendinitis of knee         Past Surgical History:   Procedure Laterality Date   • APPENDECTOMY     • FOOT SURGERY     • KNEE SURGERY     • ORIF ULNA/RADIUS FRACTURES Left 6/15/2021    Procedure: ULNA/RADIUS OPEN REDUCTION INTERNAL FIXATION;  Surgeon: Norman Brown MD;  Location: [facility];  Service: Orthopedics;  Laterality: Left;  ULNA/RADIAL OPEN REDUCTION INTERNAL FIXATION   • WRIST SURGERY           Visit Dx:    ICD-10-CM ICD-9-CM   1. Closed Colles' fracture of left radius with routine healing, subsequent encounter  S52.532D V54.12        OT Ortho     Row Name 10/20/21 1300             Subjective Pain    Able to rate subjective pain? yes  -EN      Subjective Pain Comment Patient reports she's a little sore today. \"I scrubbed my tub for 2 hours yesterday and I forgot to take all my medicine today.\"  -EN              Left Upper Ext    Lt Elbow Extension/Flexion AROM -20  -EN      Lt Elbow Pronation AROM 70  -EN      Lt Wrist Flexion PROM 26  -EN      Lt Wrist Extension AROM 45  -EN              MMT (Manual Muscle Testing)    Lt Upper Ext Lt Elbow Extension; Lt Elbow Flexion; Lt Forearm Supination; Lt Forearm Pronation; Lt Wrist Flexion; Lt Wrist Extension  -EN              MMT Left Upper Ext    Lt Elbow Flexion MMT, Gross Movement (4/5) good  within available range  -EN      Lt Elbow Extension MMT, Gross " Movement (4-/5) good minus  -EN      Lt Forearm Supination MMT, Gross Movement (3+/5) fair plus  -EN      Lt Forearm Pronation MMT, Gross Movement (3+/5) fair plus  -EN      Lt Wrist Flexion MMT, Gross Movement (3+/5) fair plus  -EN      Lt Wrist Extension MMT, Gross Movement (4-/5) good minus  -EN      Lt Upper Extremity Comments  MMT done in patient's available range  -EN            User Key  (r) = Recorded By, (t) = Taken By, (c) = Cosigned By    Initials Name Provider Type    Bailee Estrada OTR Occupational Therapist                Hand Therapy (last 24 hours)     Hand Eval     Row Name 10/20/21 1300 10/20/21 1100          Left Extension AROM    II- MP AROM -3  -EN --     II- PIP AROM -18  -EN --     II- DIP AROM -5  -EN --     II- BAPTISTE Left Extension AROM -26  -EN --     III- MP AROM 0  -EN --     III- PIP AROM -49  -EN --     III- DIP AROM -25  -EN --     III- BAPTISTE Left Extension AROM -74  -EN --     IV- MP AROM -12  -EN --     IV- PIP AROM -46  -EN --     IV- DIP AROM -21  -EN --     IV- BAPTISTE Left Extension AROM -79  -EN --     V- MP AROM 0  -EN --     V- PIP AROM -31  -EN --     V- DIP AROM -18  -EN --     V- BAPTISTE Left Extension AROM -49  -EN --            Left Flexion AROM    II- MP AROM 50  -EN --     II- PIP AROM 71  -EN --     II- DIP AROM 31  -EN --     II- BAPTISTE Left Flexion AROM 152  -EN --     III- MP AROM 51  -EN --     III- PIP AROM 96  -EN --     III- DIP AROM 61  -EN --     III- BAPTISTE Left Flexion AROM 208  -EN --     IV- MP AROM 55  -EN --     IV- PIP AROM 96  -EN --     IV- DIP AROM 61  -EN --     IV- BAPTISTE Left Flexion AROM 212  -EN --     V- MP AROM 35  -EN --     V- PIP AROM 80  -EN --     V- DIP AROM 65  -EN --     V- BAPTISTE Left Flexion AROM 180  -EN --            Left Thumb AROM    MP Flexion - AROM 50  -EN --     MP Extension- AROM -4  -EN --     IP Flexion - AROM 30  -EN --             Strength Left    # Reps -- 1  -EN     Left Rung -- 2  -EN     Left  Test 1 -- 8  -EN       Strength Average Left -- 8  -EN            Therapy Education    Given -- HEP; Symptoms/condition management  -EN     Program -- Reinforced; Progressed  -EN     How Provided -- Verbal; Demonstration  -EN     Provided to -- Patient  -EN     Level of Understanding -- Teach back education performed; Verbalized; Demonstrated  -EN           User Key  (r) = Recorded By, (t) = Taken By, (c) = Cosigned By    Initials Name Provider Type    Bailee Estrada OTABRAHAM Occupational Therapist                    Therapy Education  Given: HEP, Symptoms/condition management  Program: Reinforced, Progressed  How Provided: Verbal, Demonstration  Provided to: Patient  Level of Understanding: Teach back education performed, Verbalized, Demonstrated     OT Assessment/Plan     Row Name 10/20/21 1408          Functional Limitations Performance in leisure activities; Performance in self-care ADL; Performance in work activities; Limitation in home management  -EN    Impairments Edema; Muscle strength; Pain; Range of motion  -EN    Assessment Comments Patient demonstrated improvement in TAMs of all digits today. BAPTISTE of digit 2 was 126 degrees, 3 was 134 degrees,  4 was 133 degrees and 5 waws 131 degrees. Thumb mp Extension improved as well as IP flexion.  strength improved to 8 pounds. Patient reports she continues to have difficulty with opening jars but ADLs and IADL ind is improving. Patient would benefit from continues OT.  -EN          OT Plan Comments MD appt was moved to next week. Continue with goals.  -EN          User Key  (r) = Recorded By, (t) = Taken By, (c) = Cosigned By    Initials Name Provider Type    Bailee Estrada OTR Occupational Therapist                  OT Goals     Row Name 10/20/21 1100          OT Short Term Goals    STG Date to Achieve 10/27/21  -EN     STG 1 Patient will demonstrate independence with HEP including ROM, edema management and scar care.  -EN     STG 1 Progress Met  -EN     STG 2  Patient will improve BAPTISTE in left digits by 25 degrees for improved grasp.  -EN     STG 2 Progress Met  -EN     STG 3 Patient will improve active L wrist flexion and extension by 15 degress.  -EN     STG 3 Progress Met  -EN     STG 4 Patient will improve L forearm active pronation by 20 degrees.  -EN     STG 4 Progress Met  -EN     STG 5 Patient will report 50% less pain during activity.  -EN     STG 5 Progress Met  -EN     STG 6 Patient will improve left elbow AROM to WFL.  -EN     STG 6 Progress Ongoing  -EN            Long Term Goals    LTG Date to Achieve 10/29/21  -EN     LTG 1 Patient will demonstrate WFL left hand/wrist/forearm/elbow  AROM.  -EN     LTG 1 Progress Ongoing; Progressing  -EN     LTG 2 Patient will demonstrate WFL strength in L UE.  -EN     LTG 2 Progress Ongoing; Progressing  -EN     LTG 3 Patient will report pain 1/10 or less during activity.  -EN     LTG 3 Progress Met  -EN     LTG 4 Patient will report independence with all ADL/IADLs.  -EN     LTG 4 Progress Ongoing; Progressing  -EN     LTG 4 Progress Comments Patient reports she cannot open jars yet but is doing most other ADLs/IADLs independently.  -EN           User Key  (r) = Recorded By, (t) = Taken By, (c) = Cosigned By    Initials Name Provider Type    Bailee Estrada OTR Occupational Therapist                 Modalities     Row Name 10/20/21 1100             Moist Heat    MH Applied Yes  -EN      Location left hand/wrist  -EN      OT Moist Heat Minutes 15  -EN      MH Prior to Rx Yes  -EN            User Key  (r) = Recorded By, (t) = Taken By, (c) = Cosigned By    Initials Name Provider Type    Bailee Estrada OTABRAHAM Occupational Therapist               OT Exercises     Row Name 10/20/21 1100             Exercise 1    Exercise Name 1 retrograde edema massage- left elbow/forearm/wrist/hand  -EN              Exercise 2    Exercise Name 2 ROM exercises, elbow, forearm, wrist and hand  -EN              Exercise 3     Exercise Name 3 FMC activities  -EN              Exercise 4    Exercise Name 4 supination/pronation strengthening  -EN      Cueing 4 Verbal; Tactile  -EN      Equipment 4 Dumbell  -EN      Weights/Plates 4 3  -EN      Sets 4 2  -EN      Reps 4 20  -EN              Exercise 5    Exercise Name 5 wrist strenghtening- flexion/extension  -EN      Cueing 5 Verbal  -EN      Equipment 5 Dumbell  -EN      Weights/Plates 5 3  one set with 2 pounds, one with 3 pounds  -EN      Sets 5 2  -EN      Reps 5 20  -EN              Exercise 6    Exercise Name 6 Resistive pins  -EN      Resistance 6 Yellow; Red; Green  able to pinch one blue pin today  -EN              Exercise 7    Exercise Name 7 hand strengthening- yellow digit flex  -EN              Exercise 8    Exercise Name 8 hand strengthening- gripper  -EN      Weights/Plates 8 20  -EN      Sets 8 2  -EN      Reps 8 15  -EN            User Key  (r) = Recorded By, (t) = Taken By, (c) = Cosigned By    Initials Name Provider Type    EN Bailee Casillas OTR Occupational Therapist                              Time Calculation:   OT Start Time: 1045  OT Stop Time: 1200  OT Time Calculation (min): 75 min  Timed Charges  36143 - OT Therapeutic Activity Minutes: 15  Untimed Charges  OT Moist Heat Minutes: 15  Total Minutes  Timed Charges Total Minutes: 15  Untimed Charges Total Minutes: 15   Total Minutes: 15     Therapy Charges for Today     Code Description Service Date Service Provider Modifiers Qty    40963297897 HC OT THERAPEUTIC ACT EA 15 MIN 10/20/2021 Bailee Casillas OTR GO 1    [id number]  OT HOT OR COLD PACK TREAT MCARE 10/20/2021 Bailee Casillas OTR GO 1                    KENNETH Chavez  10/20/2021

## 2021-10-26 ENCOUNTER — HOSPITAL ENCOUNTER (OUTPATIENT)
Dept: OCCUPATIONAL THERAPY | Facility: HOSPITAL | Age: 71
Setting detail: THERAPIES SERIES
Discharge: HOME | End: 2021-10-26

## 2021-10-26 DIAGNOSIS — S52.532D CLOSED COLLES' FRACTURE OF LEFT RADIUS WITH ROUTINE HEALING, SUBSEQUENT ENCOUNTER: Primary | ICD-10-CM

## 2021-10-26 PROCEDURE — 97530 THERAPEUTIC ACTIVITIES: CPT

## 2021-10-26 NOTE — THERAPY TREATMENT NOTE
"Outpatient Occupational Therapy Ortho Treatment Note   Norah Tejeda     Patient Name: Joan Block  : 1950  MRN: [id number]  Today's Date: 10/26/2021        Visit Date: 10/26/2021    Patient Active Problem List   Diagnosis   • Distal radial fracture   • Closed fracture of left distal radius   • Fracture, Colles, left, closed   • Essential hypertension   • Mixed hyperlipidemia        Past Medical History:   Diagnosis Date   • Arthritis of back 8 yrs ago   • Fracture of wrist 2021   • Fracture, radius 2021   • Fracture, ulna 2021   • Hyperlipidemia    • Hypertension    • Tendinitis of knee         Past Surgical History:   Procedure Laterality Date   • APPENDECTOMY     • FOOT SURGERY     • KNEE SURGERY     • ORIF ULNA/RADIUS FRACTURES Left 6/15/2021    Procedure: ULNA/RADIUS OPEN REDUCTION INTERNAL FIXATION;  Surgeon: Norman Brown MD;  Location: [facility];  Service: Orthopedics;  Laterality: Left;  ULNA/RADIAL OPEN REDUCTION INTERNAL FIXATION   • WRIST SURGERY           Visit Dx:    ICD-10-CM ICD-9-CM   1. Closed Colles' fracture of left radius with routine healing, subsequent encounter  S52.532D V54.12        OT Ortho     Row Name 10/26/21 1000             Subjective Pain    Able to rate subjective pain? yes  -EN      Pre-Treatment Pain Level 0  -EN      Subjective Pain Comment Patient reports she's doing so much more at home. \"I buttoned my pants, held my hairdryer, put in my earrings and I buckled my bra!\" Patient reported a little more pain  after working all weekend and doing so much at work.  -EN            User Key  (r) = Recorded By, (t) = Taken By, (c) = Cosigned By    Initials Name Provider Type    EN Bailee Casillas OTR Occupational Therapist                Hand Therapy (last 24 hours)     Hand Eval     Row Name 10/26/21 1100              Strength Left    # Reps 1  -EN      Left Rung 2  -EN      Left  Test 1 10  -EN       Strength Average Left " 10  -EN            User Key  (r) = Recorded By, (t) = Taken By, (c) = Cosigned By    Initials Name Provider Type    Bailee Estrada OTR Occupational Therapist                    Therapy Education  Given: HEP, Symptoms/condition management  Program: Reinforced, Progressed  How Provided: Verbal, Demonstration  Provided to: Patient  Level of Understanding: Teach back education performed, Verbalized, Demonstrated     OT Assessment/Plan     Row Name 10/26/21 1242          OT Assessment    Functional Limitations Performance in leisure activities; Performance in self-care ADL; Performance in work activities; Limitation in home management  -EN     Impairments Edema; Muscle strength; Pain; Range of motion  -EN     Assessment Comments Patient reports she has used her hand much more functionally the past few weeks. Patient states she is now able to fasten her bra, put on earrings, use her hairdryer and button her slacks. Patient continues to have increased sensitivity in digit 4 however other digits/hand have improved significantly. Patient's overall wrist, forearm, elbow and hand strength have improved and patient is doing well with HEP. Patient plans to continue with HEP independently.  -EN            OT Plan    OT Plan Comments MD appointment tomorrow. Possible discharge from OT pending MD orders. Patient happy with progress and plans to continue with HEP independently.  -EN           User Key  (r) = Recorded By, (t) = Taken By, (c) = Cosigned By    Initials Name Provider Type    Bailee Estrada OTR Occupational Therapist                  OT Goals     Row Name 10/26/21 1050          OT Short Term Goals    STG Date to Achieve 10/27/21  -EN     STG 1 Patient will demonstrate independence with HEP including ROM, edema management and scar care.  -EN     STG 1 Progress Met  -EN     STG 2 Patient will improve BAPTISTE in left digits by 25 degrees for improved grasp.  -EN     STG 2 Progress Met  -EN     STG 3 Patient  will improve active L wrist flexion and extension by 15 degress.  -EN     STG 3 Progress Met  -EN     STG 4 Patient will improve L forearm active pronation by 20 degrees.  -EN     STG 4 Progress Met  -EN     STG 5 Patient will report 50% less pain during activity.  -EN     STG 5 Progress Met  -EN     STG 6 Patient will improve left elbow AROM to WFL.  -EN     STG 6 Progress Partially Met  -EN            Long Term Goals    LTG Date to Achieve 10/29/21  -EN     LTG 1 Patient will demonstrate WFL left hand/wrist/forearm/elbow  AROM.  -EN     LTG 1 Progress Partially Met  -EN     LTG 1 Progress Comments Reports she is using hand/arm very functionally  -EN     LTG 2 Patient will demonstrate WFL strength in L UE.  -EN     LTG 2 Progress Partially Met  -EN     LTG 3 Patient will report pain 1/10 or less during activity.  -EN     LTG 3 Progress Met  -EN     LTG 4 Patient will report independence with all ADL/IADLs.  -EN     LTG 4 Progress Partially Met  -EN     LTG 4 Progress Comments Is performing bathing and dressing independently. Has difficulty with some activities such as opening tight jars  -EN           User Key  (r) = Recorded By, (t) = Taken By, (c) = Cosigned By    Initials Name Provider Type    EN Bailee Casillas OTR Occupational Therapist                 Modalities     Row Name 10/26/21 1050             Moist Heat    MH Applied Yes  -EN      Location left hand/wrist  -EN      OT Moist Heat Minutes 15  -EN      MH Prior to Rx Yes  -EN            User Key  (r) = Recorded By, (t) = Taken By, (c) = Cosigned By    Initials Name Provider Type    EN Bailee Casillas OTR Occupational Therapist               OT Exercises     Row Name 10/26/21 1200             Exercise 2    Exercise Name 2 ROM exercises, elbow, forearm, wrist and hand  -EN              Exercise 3    Exercise Name 3 FMC activities  -EN              Exercise 4    Exercise Name 4 supination/pronation strengthening  -EN      Cueing 4 Verbal;  Tactile  -EN      Equipment 4 Dumbell  -EN      Weights/Plates 4 4  -EN      Sets 4 2  -EN      Reps 4 20  -EN              Exercise 5    Exercise Name 5 wrist strenghtening- flexion/extension  -EN      Cueing 5 Verbal  -EN      Equipment 5 Dumbell  -EN      Weights/Plates 5 4  one set with 3 pounds, one with 4 pounds  -EN      Sets 5 2  -EN      Reps 5 20  -EN              Exercise 6    Exercise Name 6 Resistive pins  -EN      Resistance 6 Yellow; Red; Green  able to pinch one blue pin today  -EN              Exercise 7    Exercise Name 7 hand strengthening- red digit flex  -EN      Sets 7 1  -EN      Reps 7 15  -EN              Exercise 8    Exercise Name 8 hand strengthening- gripper  -EN      Weights/Plates 8 20  -EN      Sets 8 2  -EN      Reps 8 15  -EN            User Key  (r) = Recorded By, (t) = Taken By, (c) = Cosigned By    Initials Name Provider Type    Bailee Estrada, OTR Occupational Therapist                  Quick DASH  Open a tight or new jar.: Unable  Do heavy household chores (e.g., wash walls, wash floors): Mild Difficulty  Carry a shopping bag or briefcase: Mild Difficulty  Wash your back: No Difficulty  Use a knife to cut food: Mild Difficulty  Recreational activities in which you take some force or impact through your arm, should or hand (e.g. golf, hammering, tennis, etc.): Unable  During the past week, to what extent has your arm, shoulder, or hand problem interfered with your normal social activites with family, friends, neighbors or groups?: Not at all  During the past week, were you limited in your work or other regular daily activities as a result of your arm, shoulder or hand problem?: Slightly Limited  Arm, Shoulder, or hand pain: Mild  Tingling (pins and needles) in your arm, shoulder, or hand: Mild  During the past week, how much difficulty have you had sleeping because of the pain in your arm, shoulder or hand?: No difficulty  Number of Questions Answered: 11  Quick DASH  Score: 31.82           Time Calculation:   OT Start Time: 1050  OT Stop Time: 1200  OT Time Calculation (min): 70 min  Timed Charges  92729 - OT Therapeutic Activity Minutes: 15  Untimed Charges  OT Moist Heat Minutes: 15  Total Minutes  Timed Charges Total Minutes: 15  Untimed Charges Total Minutes: 15   Total Minutes: 15     Therapy Charges for Today     Code Description Service Date Service Provider Modifiers Qty    64862002690 HC OT HOT OR COLD PACK TREAT MCARE 10/26/2021 Bailee Casillas OTR GO 1    89009605631 HC OT THERAPEUTIC ACT EA 15 MIN 10/26/2021 Bailee Casillas OTR GO 1                    KENNETH Chavez  10/26/2021

## 2021-10-27 ENCOUNTER — OFFICE VISIT (OUTPATIENT)
Dept: ORTHOPEDIC SURGERY | Facility: CLINIC | Age: 71
End: 2021-10-27

## 2021-10-27 VITALS — BODY MASS INDEX: 34.41 KG/M2 | WEIGHT: 187 LBS | HEIGHT: 62 IN

## 2021-10-27 DIAGNOSIS — S52.532A CLOSED COLLES' FRACTURE OF LEFT RADIUS, INITIAL ENCOUNTER: Primary | ICD-10-CM

## 2021-10-27 DIAGNOSIS — R52 PAIN: ICD-10-CM

## 2021-10-27 DIAGNOSIS — Z09 STATUS POST ORTHOPEDIC SURGERY, FOLLOW-UP EXAM: ICD-10-CM

## 2021-10-27 PROCEDURE — 99213 OFFICE O/P EST LOW 20 MIN: CPT | Performed by: ORTHOPAEDIC SURGERY

## 2021-10-27 RX ORDER — PREGABALIN 75 MG/1
75 CAPSULE ORAL 2 TIMES DAILY
Qty: 60 CAPSULE | Refills: 0 | Status: SHIPPED | OUTPATIENT
Start: 2021-10-27 | End: 2021-11-24

## 2021-10-27 NOTE — PROGRESS NOTES
Subjective: Status post ORIF left talus fracture     Patient ID: Joan Block is a 71 y.o. female.    Chief Complaint:    History of Present Illness patient is 4 months out is making progress of the is slowed physical therapy.  The major problem is stiffness in her digits.  She cannot fully extend or flex the digits of her hand.  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  Whether that is contributing to her postoperative recovery at this time is undetermined.       Social History     Occupational History   • Not on file   Tobacco Use   • Smoking status: Former Smoker     Packs/day: 0.50     Years: 15.00     Pack years: 7.50     Types: Cigarettes     Start date: 1970     Quit date: 10/1/1985     Years since quittin.0   • Smokeless tobacco: Never Used   Vaping Use   • Vaping Use: Never used   Substance and Sexual Activity   • Alcohol use: No   • Drug use: No   • Sexual activity: Not Currently     Partners: Male     Birth control/protection: Post-menopausal      Review of Systems   Constitutional: Negative for chills, diaphoresis, fever and unexpected weight change.   HENT: Negative for hearing loss, nosebleeds, sore throat and tinnitus.    Eyes: Negative for pain and visual disturbance.   Respiratory: Negative for cough, shortness of breath and wheezing.    Cardiovascular: Negative for chest pain and palpitations.   Gastrointestinal: Negative for abdominal pain, diarrhea, nausea and vomiting.   Endocrine: Negative for cold intolerance, heat intolerance and polydipsia.   Genitourinary: Negative for difficulty urinating, dysuria and hematuria.   Musculoskeletal: Positive for arthralgias and myalgias. Negative for joint swelling.   Skin: Negative for rash and wound.   Allergic/Immunologic: Negative for environmental allergies.   Neurological: Negative for dizziness, syncope and numbness.    Hematological: Does not bruise/bleed easily.   Psychiatric/Behavioral: Negative for dysphoric mood and sleep disturbance. The patient is not nervous/anxious.          Past Medical History:   Diagnosis Date   • Arthritis of back 8 yrs ago   • Fracture of wrist June 7 2021   • Fracture, radius June 7 2021   • Fracture, ulna June 7 2021   • Hyperlipidemia    • Hypertension    • Tendinitis of knee 2019     Past Surgical History:   Procedure Laterality Date   • APPENDECTOMY     • FOOT SURGERY     • KNEE SURGERY     • ORIF ULNA/RADIUS FRACTURES Left 6/15/2021    Procedure: ULNA/RADIUS OPEN REDUCTION INTERNAL FIXATION;  Surgeon: Norman Brown MD;  Location: [facility];  Service: Orthopedics;  Laterality: Left;  ULNA/RADIAL OPEN REDUCTION INTERNAL FIXATION   • WRIST SURGERY  2021     Family History   Problem Relation Age of Onset   • Cancer Mother    • Breast cancer Mother    • Diabetes Father          Objective:  There were no vitals filed for this visit.      10/27/21  1407   Weight: 84.8 kg (187 lb)     Body mass index is 34.19 kg/m².        Ortho Exam   She is alert and oriented x3.  She has near full extension of the MCP J's of the index through little bit at the PIPJ's she has probably a 45 to 50 degree contracture.  10 to 15 degrees contractures of the DIPJ's.  There is still some fusiform swelling also.  She has good capillary refill.  Again she has paresthesia involving the ring finger.  She been taking the Lyrica to help control the pain.  She has 20 to 30 degrees of volar and dorsiflexion of the wrist.  She has full range of motion of the elbow.    Assessment:        1. Closed Colles' fracture of left radius, initial encounter    2. Status post orthopedic surgery, follow-up exam    3. Pain           Plan: She is 4 months out on not sure how much more progress she is going to make she is working on her own but I would like her to continue occupational therapy as long as Medicare continues to pay for it.  I do  not think she is going to make significant progress without additional therapy.  Refill the Lyrica.  Return to see me in 6 weeks.  Answered all questions            Work Status:    GIANA query complete.    Orders:  Orders Placed This Encounter   Procedures   • Ambulatory Referral to Occupational Therapy       Medications:  New Medications Ordered This Visit   Medications   • pregabalin (Lyrica) 75 MG capsule     Sig: Take 1 capsule by mouth 2 (Two) Times a Day.     Dispense:  60 capsule     Refill:  0       Followup:  Return in about 6 weeks (around 12/8/2021).          Dictated utilizing Dragon dictation

## 2021-10-28 NOTE — PROGRESS NOTES
Injection  Injection performed by Odilia Horowitz. Patient tolerated the procedure well without complications.  10/28/21   Odilia Horowitz

## 2021-11-01 ENCOUNTER — APPOINTMENT (OUTPATIENT)
Dept: OCCUPATIONAL THERAPY | Facility: HOSPITAL | Age: 71
End: 2021-11-01

## 2021-11-03 ENCOUNTER — HOSPITAL ENCOUNTER (OUTPATIENT)
Dept: OCCUPATIONAL THERAPY | Facility: HOSPITAL | Age: 71
Setting detail: THERAPIES SERIES
Discharge: HOME | End: 2021-11-03

## 2021-11-03 DIAGNOSIS — S52.532D CLOSED COLLES' FRACTURE OF LEFT RADIUS WITH ROUTINE HEALING, SUBSEQUENT ENCOUNTER: Primary | ICD-10-CM

## 2021-11-03 NOTE — THERAPY EVALUATION
Outpatient Occupational Therapy Ortho Re-Evaluation   Norah Tejeda     Patient Name: Joan Block  : 1950  MRN: [id number]  Today's Date: 11/3/2021      Visit Date: 2021    Patient Active Problem List   Diagnosis   • Distal radial fracture   • Closed fracture of left distal radius   • Fracture, Colles, left, closed   • Essential hypertension   • Mixed hyperlipidemia        Past Medical History:   Diagnosis Date   • Arthritis of back 8 yrs ago   • Fracture of wrist 2021   • Fracture, radius 2021   • Fracture, ulna 2021   • Hyperlipidemia    • Hypertension    • Tendinitis of knee         Past Surgical History:   Procedure Laterality Date   • APPENDECTOMY     • FOOT SURGERY     • KNEE SURGERY     • ORIF ULNA/RADIUS FRACTURES Left 6/15/2021    Procedure: ULNA/RADIUS OPEN REDUCTION INTERNAL FIXATION;  Surgeon: Norman Brown MD;  Location: [facility];  Service: Orthopedics;  Laterality: Left;  ULNA/RADIAL OPEN REDUCTION INTERNAL FIXATION   • WRIST SURGERY           Visit Dx:    ICD-10-CM ICD-9-CM   1. Closed Colles' fracture of left radius with routine healing, subsequent encounter  S52.532D V54.12            OT Ortho     Row Name 21 1300             Subjective Pain    Able to rate subjective pain? yes  -EN      Pre-Treatment Pain Level 0  -EN      Post-Treatment Pain Level 0  -EN      Subjective Pain Comment Patient reports non pain at rest, only with passive stretch and lifting.  -EN              Left Upper Ext    Lt Elbow Extension/Flexion AROM -17  -EN      Lt Elbow Pronation AROM 70  -EN      Lt Elbow Pronation PROM 75  -EN      Lt Wrist Flexion AROM 27  -EN      Lt Wrist Flexion PROM 35  -EN      Lt Wrist Extension AROM 46  -EN      Lt Wrist Extension PROM 60  -EN              MMT Left Upper Ext    Lt Forearm Supination MMT, Gross Movement (4-/5) good minus  MMT done within available range  -EN      Lt Forearm Pronation MMT, Gross Movement (3+/5) fair plus  -EN       Lt Wrist Flexion MMT, Gross Movement (4-/5) good minus  -EN      Lt Wrist Extension MMT, Gross Movement (4-/5) good minus  -EN            User Key  (r) = Recorded By, (t) = Taken By, (c) = Cosigned By    Initials Name Provider Type    Bailee Estrada OTR Occupational Therapist                Hand Therapy (last 24 hours)     Hand Eval     Row Name 11/03/21 1300             Left Extension AROM    II- MP AROM -3  -EN      II- PIP AROM -21  -EN      II- DIP AROM -5  -EN      II- BAPTISTE Left Extension AROM -29  -EN      III- MP AROM 0  -EN      III- PIP AROM -48  -EN      III- DIP AROM -25  -EN      III- BAPTISTE Left Extension AROM -73  -EN      IV- MP AROM -8  -EN      IV- PIP AROM -46  -EN      IV- DIP AROM -22  -EN      IV- BAPTISTE Left Extension AROM -76  -EN      V- MP AROM -2  -EN      V- PIP AROM -31  -EN      V- DIP AROM -15  -EN      V- BAPTISTE Left Extension AROM -48  -EN              Left Flexion AROM    II- MP AROM 54  -EN      II- PIP AROM 85  -EN      II- DIP AROM 51  -EN      II- BAPTISTE Left Flexion AROM 190  -EN      III- MP AROM 54  -EN      III- PIP AROM 102  -EN      III- DIP AROM 80  -EN      III- BAPTISTE Left Flexion AROM 236  -EN      IV- MP AROM 60  -EN      IV- PIP AROM 102  -EN      IV- DIP AROM 70  -EN      IV- BAPTISTE Left Flexion AROM 232  -EN      V- MP AROM 45  -EN      V- PIP AROM 85  -EN      V- DIP AROM 70  -EN      V- BAPTISTE Left Flexion AROM 200  -EN              Left Thumb AROM    MP Flexion - AROM 50  -EN      MP Extension- AROM -4  -EN      IP Flexion - AROM 30  -EN               Strength Left    # Reps 1  -EN      Left Rung 2  -EN      Left  Test 1 10  -EN       Strength Average Left 10  -EN              Therapy Education    Given HEP; Symptoms/condition management; Edema management  -EN      Program Reinforced; Progressed  -EN      How Provided Verbal; Demonstration  -EN      Provided to Patient  -EN      Level of Understanding Teach back education performed; Verbalized; Demonstrated  -EN             User Key  (r) = Recorded By, (t) = Taken By, (c) = Cosigned By    Initials Name Provider Type    Bailee Estrada OTR Occupational Therapist                    Therapy Education  Given: HEP, Symptoms/condition management, Edema management  Program: Reinforced, Progressed  How Provided: Verbal, Demonstration  Provided to: Patient  Level of Understanding: Teach back education performed, Verbalized, Demonstrated         OT Assessment/Plan     Row Name 11/03/21 1341 11/03/21 1339       OT Assessment    Functional Limitations -- Performance in leisure activities; Performance in self-care ADL; Performance in work activities; Limitation in home management  -EN    Impairments -- Edema; Muscle strength; Pain; Range of motion  -EN    Assessment Comments Patient reports she put on her watch for the first time since her fracture and was able to clasp it independently. Patient reports she is using her more functionally however still has difficulty opening containers, jars, etc.. Patient has made significant progress the past week with digit flexion overall BAPTISTE in digits as follows: Digit 2 improved (from last week) from 126 degrees to 161 degrees. Digit 3 improved from 134 to 163 degrees, digit 4 improved from 133 to 156 and digit 5 improved from 131 to 152 degrees. Digit one had no change. Wrist AROM improved slightly and strength also improved in the wrist and forearm. Patient would benefit from continued OT 2 X per week as advised by MD at last appointment however patient is limited by insurance. Will continue with one time per week and HEP has been reinforced and added and all strengthening exercises will continue to be progressed on a weekly basis.  -EN --    OT Diagnosis decreased L UE function, pain, decreased ADL/IADLs  -EN --    Patient/caregiver participated in establishment of treatment plan and goals Yes  -EN --    Patient would benefit from skilled therapy intervention Yes  -EN --       OT Plan     OT Frequency 1x/week  -EN --    Predicted Duration of Therapy Intervention (OT) 6 weeks  -EN --    Planned Therapy Interventions (Optional Details) home exercise program; patient/family education; ROM (Range of Motion); strengthening; other (see comments)  -EN --    OT Plan Comments Continue OT 1 X per week X 6 weeks.  -EN --          User Key  (r) = Recorded By, (t) = Taken By, (c) = Cosigned By    Initials Name Provider Type    Bailee Estrada OTR Occupational Therapist                  OT Exercises     Row Name 11/03/21 1300             Exercise 2    Exercise Name 2 ROM exercises, elbow, forearm, wrist and hand  -EN              Exercise 4    Exercise Name 4 supination/pronation strengthening  -EN      Cueing 4 Verbal; Tactile  -EN      Equipment 4 Dumbell  -EN      Weights/Plates 4 4  -EN      Sets 4 2  -EN      Reps 4 20  -EN              Exercise 5    Exercise Name 5 wrist strenghtening- flexion/extension  -EN      Cueing 5 Verbal  -EN      Equipment 5 Dumbell  -EN      Weights/Plates 5 4  -EN      Sets 5 2  -EN      Reps 5 15  -EN              Exercise 6    Exercise Name 6 Resistive pins  -EN      Resistance 6 Green; Blue; Yellow  able to compete 2 blue pins  -EN              Exercise 7    Exercise Name 7 hand strengthening- hand gripper  -EN      Equipment 7 Hand Gripper  -EN      Sets 7 2  -EN      Reps 7 15  one set with 15#, one set with 25#  -EN              Exercise 8    Exercise Name 8 forearm strengthening- supinator bar  -EN      Weights/Plates 8 2  -EN      Sets 8 2  -EN      Reps 8 10  -EN              Exercise 9    Exercise Name 9 elbow strengthening  -EN      Equipment 9 Dumbell  -EN      Sets 9 1  -EN      Reps 9 15  -EN            User Key  (r) = Recorded By, (t) = Taken By, (c) = Cosigned By    Initials Name Provider Type    Bailee Estrada OTR Occupational Therapist                            Time Calculation:    OT Start Time: 1145  OT Stop Time: 1300  OT Time Calculation  (min): 75 min  Untimed Charges  OT Moist Heat Minutes: 15  Total Minutes  Untimed Charges Total Minutes: 15   Total Minutes: 15                 Bailee Casillas, OTR  11/3/2021

## 2021-11-10 ENCOUNTER — HOSPITAL ENCOUNTER (OUTPATIENT)
Dept: OCCUPATIONAL THERAPY | Facility: HOSPITAL | Age: 71
Setting detail: THERAPIES SERIES
Discharge: HOME | End: 2021-11-10

## 2021-11-10 DIAGNOSIS — Z09 STATUS POST ORTHOPEDIC SURGERY, FOLLOW-UP EXAM: ICD-10-CM

## 2021-11-10 DIAGNOSIS — R52 PAIN: ICD-10-CM

## 2021-11-10 DIAGNOSIS — S52.532A CLOSED COLLES' FRACTURE OF LEFT RADIUS, INITIAL ENCOUNTER: Primary | ICD-10-CM

## 2021-11-10 NOTE — THERAPY TREATMENT NOTE
"Outpatient Occupational Therapy Ortho Treatment Note   Norah Tejeda     Patient Name: Joan Block  : 1950  MRN: [id number]  Today's Date: 11/10/2021        Visit Date: 11/10/2021    Patient Active Problem List   Diagnosis   • Distal radial fracture   • Closed fracture of left distal radius   • Fracture, Colles, left, closed   • Essential hypertension   • Mixed hyperlipidemia        Past Medical History:   Diagnosis Date   • Arthritis of back 8 yrs ago   • Fracture of wrist 2021   • Fracture, radius 2021   • Fracture, ulna 2021   • Hyperlipidemia    • Hypertension    • Tendinitis of knee         Past Surgical History:   Procedure Laterality Date   • APPENDECTOMY     • FOOT SURGERY     • KNEE SURGERY     • ORIF ULNA/RADIUS FRACTURES Left 6/15/2021    Procedure: ULNA/RADIUS OPEN REDUCTION INTERNAL FIXATION;  Surgeon: Norman Brown MD;  Location: [facility];  Service: Orthopedics;  Laterality: Left;  ULNA/RADIAL OPEN REDUCTION INTERNAL FIXATION   • WRIST SURGERY           Visit Dx:  No diagnosis found.     OT Ortho     Row Name 11/10/21 1200             Subjective Pain    Subjective Pain Comment Patient reports she had to stop medicine ortho doctor prescribed (Pregabalin) because it was giving her diarrhea. Reports pain has increased a lot and she has sent a message to ortho for a different med. Patient reports, \"I made a list of all the things you keep asking me that I have trouble with.\" Patient reports difficutly removing pans from the oven, using her curling iron, scrubbing kitchen, lifting soft drinks, and opening the car door.  -EN              Left Upper Ext    Lt Elbow Extension/Flexion AROM -17  -EN      Lt Elbow Pronation AROM 70  -EN      Lt Wrist Flexion AROM 26  -EN      Lt Wrist Extension AROM 47  -EN              MMT Left Upper Ext    Lt Forearm Supination MMT, Gross Movement (4-/5) good minus  -EN      Lt Forearm Pronation MMT, Gross Movement (3+/5) fair plus  -EN  "     Lt Wrist Flexion MMT, Gross Movement (4/5) good  -EN      Lt Wrist Extension MMT, Gross Movement (4/5) good  -EN      Lt Upper Extremity Comments  MMT done in patient's available rang  -EN            User Key  (r) = Recorded By, (t) = Taken By, (c) = Cosigned By    Initials Name Provider Type    EN Bailee Casillas OTR Occupational Therapist                Hand Therapy (last 24 hours)     Hand Eval     Row Name 11/10/21 1200              Strength Left    # Reps 1  -EN      Left Rung 2  -EN      Left  Test 1 10  -EN       Strength Average Left 10  -EN              Left Hand Strength - Pinch (lbs)    Lateral 6 lbs  -EN              Therapy Education    Education Details Red theraband exercises added to HEP for wrist and elbow strengthening. Reinforced hammer exercise for forearm strengening and reinforced putty and ROM home exercises.  -EN      Given HEP; Symptoms/condition management; Edema management  -EN      Program Reinforced; Progressed  -EN      How Provided Verbal; Demonstration; Written  -EN      Provided to Patient  -EN      Level of Understanding Teach back education performed; Verbalized; Demonstrated  -EN            User Key  (r) = Recorded By, (t) = Taken By, (c) = Cosigned By    Initials Name Provider Type    EN Bailee Casillas OTR Occupational Therapist                    Therapy Education  Education Details: Red theraband exercises added to HEP for wrist and elbow strengthening. Reinforced hammer exercise for forearm strengening and reinforced putty and ROM home exercises.  Given: HEP, Symptoms/condition management, Edema management  Program: Reinforced, Progressed  How Provided: Verbal, Demonstration, Written  Provided to: Patient  Level of Understanding: Teach back education performed, Verbalized, Demonstrated     OT Assessment/Plan     Row Name 11/10/21 1315          OT Assessment    Functional Limitations Performance in leisure activities; Performance in self-care ADL;  Performance in work activities; Limitation in home management  -EN     Impairments Edema; Muscle strength; Pain; Range of motion  -EN     Assessment Comments Patient reports increased pain this week secondary to stopping her Pregabalin due to GI problems which she feels are related to the medicine. States she sent a message to Dr. Hong office to see if there's another med she can try. Patient reports she was not able to do all of her exercises the past week due to pain. Patient stated pain is 0/10 at rest today because she took a pain pill. Patient had no pain at rest today however did have pain up to 5-6/10 during passive stretches. Patient encouraged to speak to Dr Brown about medication so she can continue to work on HEP and use her hand/arm functionally. Reinforced HEP as pain allows.  -EN            OT Plan    OT Plan Comments Continue with goals.  -EN           User Key  (r) = Recorded By, (t) = Taken By, (c) = Cosigned By    Initials Name Provider Type    Bailee Estrada OTR Occupational Therapist                  OT Goals     Row Name 11/10/21 1300          OT Short Term Goals    STG Date to Achieve 10/27/21  -EN     STG 1 Patient will demonstrate independence with HEP including ROM, edema management and scar care.  -EN     STG 1 Progress Met  -EN     STG 2 Patient will improve BAPTISTE in left digits by 25 degrees for improved grasp.  -EN     STG 2 Progress Met  -EN     STG 3 Patient will improve active L wrist flexion and extension by 15 degress.  -EN     STG 3 Progress Met  -EN     STG 4 Patient will improve L forearm active pronation by 20 degrees.  -EN     STG 4 Progress Met  -EN     STG 5 Patient will report 50% less pain during activity.  -EN     STG 5 Progress Met  -EN     STG 6 Patient will improve left elbow AROM to WFL.  -EN     STG 6 Progress Ongoing; Progressing  -EN            Long Term Goals    LTG Date to Achieve 10/29/21  -EN     LTG 1 Patient will demonstrate WFL left  hand/wrist/forearm/elbow  AROM.  -EN     LTG 1 Progress Progressing; Ongoing  -EN     LTG 2 Patient will demonstrate WFL strength in L UE.  -EN     LTG 2 Progress Ongoing; Progressing  -EN     LTG 3 Patient will report pain 1/10 or less during activity.  -EN     LTG 3 Progress Met  -EN     LTG 4 Patient will report independence with all ADL/IADLs.  -EN     LTG 4 Progress Progressing; Ongoing  -EN     LTG 4 Progress Comments difficulty removing pans from oven, using curling iron, scrubbing kitchen, lifting soft drinks, and opening car door.  -EN           User Key  (r) = Recorded By, (t) = Taken By, (c) = Cosigned By    Initials Name Provider Type    Bailee Estrada OTR Occupational Therapist                   OT Exercises     Row Name 11/10/21 1200             Exercise 2    Exercise Name 2 ROM exercises for elbow, forearm, wrist and hand. Continue to have pain with PROM in all joints, no c/o pain with AROM  -EN              Exercise 4    Exercise Name 4 Forearm strengthening- pronation/supination  -EN      Cueing 4 Verbal; Tactile; Demo  -EN      Equipment 4 Dumbell  one set with 4 pound dumbell and one set with weighted supinator bar (2#)  -EN      Sets 4 2  -EN      Reps 4 20  -EN              Exercise 5    Exercise Name 5 wrist strenghtening- flexion/extension  -EN      Cueing 5 Verbal  -EN      Equipment 5 Dumbell  -EN      Weights/Plates 5 4  -EN      Sets 5 2  -EN      Reps 5 15  -EN              Exercise 6    Exercise Name 6 hand strengthening- resisitive pins  -EN      Cueing 6 Verbal  -EN      Resistance 6 Red; Green; Blue  -EN              Exercise 7    Exercise Name 7 Hand strengthening- digi-flex  -EN      Cueing 7 Other (comment)  -EN      Resistance 7 Yellow  -EN      Sets 7 2  -EN      Reps 7 15  -EN      Time (Seconds) 7 3  -EN              Exercise 8    Exercise Name 8 Hand strengthening- hand gripper  -EN      Cueing 8 Verbal; Demo  -EN      Weights/Plates 8 25  -EN      Sets 8 2  -EN       Reps 8 15  -EN      Time (Seconds) 8 3  -EN              Exercise 9    Exercise Name 9 Elbow strengthening- flexion/extension  -EN      Equipment 9 Dumbell  -EN      Weights/Plates 9 4  -EN      Sets 9 2  -EN      Reps 9 15  -EN              Exercise 10    Exercise Name 10 wrist strengthening with red theraband X 10 one set  -EN              Exercise 11    Exercise Name 11 Elbow strengthening with red theraband- one set of 10  -EN            User Key  (r) = Recorded By, (t) = Taken By, (c) = Cosigned By    Initials Name Provider Type    EN Bailee Casillas OTR Occupational Therapist                              Time Calculation:   OT Start Time: 1143  OT Stop Time: 1300  OT Time Calculation (min): 77 min                   KENNETH Chavez  11/10/2021

## 2021-11-17 ENCOUNTER — DOCUMENTATION (OUTPATIENT)
Dept: OCCUPATIONAL THERAPY | Facility: HOSPITAL | Age: 71
End: 2021-11-17

## 2021-11-17 NOTE — TREATMENT PLAN
Patient called and left message that she is having an EMG . Returned call to patient and patient stated MD discontinued Pregabalin due to symptoms. Reports he would like her to have an EMG. Patient reports she will continue with HEP independently at home. Patient encouraged to perform exercises within pain limits and to call with questions.

## 2021-11-18 ENCOUNTER — TELEPHONE (OUTPATIENT)
Dept: ORTHOPEDIC SURGERY | Facility: CLINIC | Age: 71
End: 2021-11-18

## 2021-11-18 NOTE — TELEPHONE ENCOUNTER
Patient called the clinic back she states that her wrist and hand are actually better today- after thinking about it she did get her COVID 19 booster Monday and was extremely sick fever, chills, and all over body pain Tuesday and yesterday when she sent the message (Wednesday the pain was bad in her hand). She thinks this could be related since it has calmed down. She would like to defer the appt offered for tomorrow and be seen next week.    Thanks.

## 2021-11-18 NOTE — TELEPHONE ENCOUNTER
----- Message from Joan Block sent at 11/17/2021  4:36 PM EST -----  Regarding: Wrist assessment   The 1st available spptmt for the EMG is March 10th 2022. I'm not able to have ot any longer but palm contractions continue along with shooting pains. Not sure if anymore can be done until next office visit with you on December 8?

## 2021-11-24 ENCOUNTER — OFFICE VISIT (OUTPATIENT)
Dept: ORTHOPEDIC SURGERY | Facility: CLINIC | Age: 71
End: 2021-11-24

## 2021-11-24 VITALS — BODY MASS INDEX: 34.41 KG/M2 | WEIGHT: 187 LBS | HEIGHT: 62 IN

## 2021-11-24 DIAGNOSIS — Z09 STATUS POST ORTHOPEDIC SURGERY, FOLLOW-UP EXAM: ICD-10-CM

## 2021-11-24 DIAGNOSIS — S52.532A CLOSED COLLES' FRACTURE OF LEFT RADIUS, INITIAL ENCOUNTER: Primary | ICD-10-CM

## 2021-11-24 DIAGNOSIS — R52 PAIN: ICD-10-CM

## 2021-11-24 PROCEDURE — 99211 OFF/OP EST MAY X REQ PHY/QHP: CPT | Performed by: ORTHOPAEDIC SURGERY

## 2021-11-24 NOTE — PROGRESS NOTES
Subjective:     Patient ID: Joan Block is a 71 y.o. female.    Chief Complaint:    History of Present Illness       Social History     Occupational History   • Not on file   Tobacco Use   • Smoking status: Former Smoker     Packs/day: 0.50     Years: 15.00     Pack years: 7.50     Types: Cigarettes     Start date: 1970     Quit date: 10/1/1985     Years since quittin.1   • Smokeless tobacco: Never Used   Vaping Use   • Vaping Use: Never used   Substance and Sexual Activity   • Alcohol use: No   • Drug use: No   • Sexual activity: Not Currently     Partners: Male     Birth control/protection: Post-menopausal      Review of Systems   Constitutional: Negative for chills, diaphoresis, fever and unexpected weight change.   HENT: Negative for hearing loss, nosebleeds, sore throat and tinnitus.    Eyes: Negative for pain and visual disturbance.   Respiratory: Negative for cough, shortness of breath and wheezing.    Cardiovascular: Negative for chest pain and palpitations.   Gastrointestinal: Negative for abdominal pain, diarrhea, nausea and vomiting.   Endocrine: Negative for cold intolerance, heat intolerance and polydipsia.   Genitourinary: Negative for difficulty urinating, dysuria and hematuria.   Musculoskeletal: Positive for arthralgias and myalgias. Negative for joint swelling.   Skin: Negative for rash and wound.   Allergic/Immunologic: Negative for environmental allergies.   Neurological: Negative for dizziness, syncope and numbness.   Hematological: Does not bruise/bleed easily.   Psychiatric/Behavioral: Negative for dysphoric mood and sleep disturbance. The patient is not nervous/anxious.          Past Medical History:   Diagnosis Date   • Arthritis of back 8 yrs ago   • Fracture of wrist 2021   • Fracture, radius 2021   • Fracture, ulna 2021   • Hyperlipidemia    • Hypertension    • Tendinitis of knee      Past Surgical History:   Procedure Laterality Date   • APPENDECTOMY      • FOOT SURGERY     • KNEE SURGERY     • ORIF ULNA/RADIUS FRACTURES Left 6/15/2021    Procedure: ULNA/RADIUS OPEN REDUCTION INTERNAL FIXATION;  Surgeon: Norman Brown MD;  Location: [facility];  Service: Orthopedics;  Laterality: Left;  ULNA/RADIAL OPEN REDUCTION INTERNAL FIXATION   • WRIST SURGERY  2021     Family History   Problem Relation Age of Onset   • Cancer Mother    • Breast cancer Mother    • Diabetes Father          Objective:  There were no vitals filed for this visit.      11/24/21  0930   Weight: 84.8 kg (187 lb)     Body mass index is 34.19 kg/m².        Ortho Exam       Assessment:        1. Closed Colles' fracture of left radius, initial encounter    2. Status post orthopedic surgery, follow-up exam    3. Pain           Plan:            Work Status:    GIANA query complete.    Orders:  No orders of the defined types were placed in this encounter.      Medications:  No orders of the defined types were placed in this encounter.      Followup:  Return in about 3 months (around 2/24/2022).          Dictated utilizing Dragon dictation
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
## 2021-12-20 ENCOUNTER — DOCUMENTATION (OUTPATIENT)
Dept: OCCUPATIONAL THERAPY | Facility: HOSPITAL | Age: 71
End: 2021-12-20

## 2021-12-20 DIAGNOSIS — S52.532D CLOSED COLLES' FRACTURE OF LEFT RADIUS WITH ROUTINE HEALING, SUBSEQUENT ENCOUNTER: Primary | ICD-10-CM

## 2021-12-20 NOTE — THERAPY DISCHARGE NOTE
Outpatient Occupational Therapy Discharge Summary         Patient Name: Joan Block  : 1950  MRN: [id number]    Today's Date: 2021      Visit Date: 2021       OT Goals     Row Name 21 1400          OT Short Term Goals    STG Date to Achieve 10/27/21  -EN     STG 1 Patient will demonstrate independence with HEP including ROM, edema management and scar care.  -EN     STG 1 Progress Met  -EN     STG 2 Patient will improve BAPTISTE in left digits by 25 degrees for improved grasp.  -EN     STG 2 Progress Met  -EN     STG 3 Patient will improve active L wrist flexion and extension by 15 degress.  -EN     STG 3 Progress Met  -EN     STG 4 Patient will improve L forearm active pronation by 20 degrees.  -EN     STG 4 Progress Met  -EN     STG 5 Patient will report 50% less pain during activity.  -EN     STG 5 Progress Met  -EN     STG 6 Patient will improve left elbow AROM to WFL.  -EN     STG 6 Progress Partially Met  -EN            Long Term Goals    LTG Date to Achieve 10/29/21  -EN     LTG 1 Patient will demonstrate WFL left hand/wrist/forearm/elbow  AROM.  -EN     LTG 1 Progress Partially Met  -EN     LTG 2 Patient will demonstrate WFL strength in L UE.  -EN     LTG 2 Progress Partially Met  -EN     LTG 3 Patient will report pain 1/10 or less during activity.  -EN     LTG 3 Progress Met  -EN     LTG 4 Patient will report independence with all ADL/IADLs.  -EN     LTG 4 Progress Partially Met  -EN           User Key  (r) = Recorded By, (t) = Taken By, (c) = Cosigned By    Initials Name Provider Type    Bailee Estrada OTR Occupational Therapist                 OP OT Discharge Summary  Reason for Discharge: other (comment)  Outcomes Achieved: Patient able to partially acheive established goals  Discharge Destination: Home with home program      Time Calculation:                         KENNETH Chavez   2021

## 2022-01-04 RX ORDER — LISINOPRIL 10 MG/1
10 TABLET ORAL DAILY
Qty: 30 TABLET | Refills: 3 | Status: SHIPPED | OUTPATIENT
Start: 2022-01-04 | End: 2022-06-28 | Stop reason: SDUPTHER

## 2022-01-04 RX ORDER — ATORVASTATIN CALCIUM 10 MG/1
10 TABLET, FILM COATED ORAL DAILY
Qty: 30 TABLET | Refills: 3 | Status: SHIPPED | OUTPATIENT
Start: 2022-01-04 | End: 2022-06-28

## 2022-01-04 NOTE — TELEPHONE ENCOUNTER
Caller: Joan Block    Relationship: Self    Best call back number: [phone]    Requested Prescriptions:   Requested Prescriptions     Pending Prescriptions Disp Refills   • lisinopril (PRINIVIL,ZESTRIL) 10 MG tablet       Sig: Take 1 tablet by mouth Daily.   • atorvastatin (LIPITOR) 10 MG tablet       Sig: Take 1 tablet by mouth Daily.        Pharmacy where request should be sent: [facility] [address] [place] [address] [phone] [facility] [phone] FX       Does the patient have less than a 3 day supply:  [] Yes  [x] No    Skyler Brooke   01/04/22 13:19 EST

## 2022-01-14 ENCOUNTER — TELEPHONE (OUTPATIENT)
Dept: ORTHOPEDIC SURGERY | Facility: CLINIC | Age: 72
End: 2022-01-14

## 2022-01-14 NOTE — TELEPHONE ENCOUNTER
Caller: patient     Relationship: self    Best call back number: [phone]    What orders are you requesting (i.e. lab or imaging): ORDER FOR OCCUPATIONAL THERAPY     Additional notes: PT. STATES THAT DR. MIRANDA TOLD HER TO CALL IN January TO GET ORDER AND SCHEDULED FOR OCCUPATIONAL THERAPY FOR HER LEFT WRIST.   PLEASE CALL TO ADVISE.

## 2022-01-15 DIAGNOSIS — S52.532A CLOSED COLLES' FRACTURE OF LEFT RADIUS, INITIAL ENCOUNTER: Primary | ICD-10-CM

## 2022-01-15 DIAGNOSIS — Z09 STATUS POST ORTHOPEDIC SURGERY, FOLLOW-UP EXAM: ICD-10-CM

## 2022-01-19 ENCOUNTER — HOSPITAL ENCOUNTER (OUTPATIENT)
Dept: OCCUPATIONAL THERAPY | Facility: HOSPITAL | Age: 72
Setting detail: THERAPIES SERIES
Discharge: HOME | End: 2022-01-19

## 2022-01-19 DIAGNOSIS — S52.532D CLOSED COLLES' FRACTURE OF LEFT RADIUS WITH ROUTINE HEALING, SUBSEQUENT ENCOUNTER: Primary | ICD-10-CM

## 2022-01-19 DIAGNOSIS — M79.642 HAND PAIN, LEFT: ICD-10-CM

## 2022-01-19 DIAGNOSIS — M25.532 WRIST PAIN, LEFT: ICD-10-CM

## 2022-01-19 PROCEDURE — 97165 OT EVAL LOW COMPLEX 30 MIN: CPT

## 2022-01-19 NOTE — THERAPY EVALUATION
Outpatient Occupational Therapy Ortho Initial Evaluation   [place]     Patient Name: Joan Block  : 1950  MRN: [id number]  Today's Date: 2022      Visit Date: 2022    Patient Active Problem List   Diagnosis   • Distal radial fracture   • Closed fracture of left distal radius   • Fracture, Colles, left, closed   • Essential hypertension   • Mixed hyperlipidemia        Past Medical History:   Diagnosis Date   • Arthritis of back 8 yrs ago   • Fracture of wrist 2021   • Fracture, radius 2021   • Fracture, ulna 2021   • Hyperlipidemia    • Hypertension    • Tendinitis of knee         Past Surgical History:   Procedure Laterality Date   • APPENDECTOMY     • FOOT SURGERY     • KNEE SURGERY     • ORIF ULNA/RADIUS FRACTURES Left 6/15/2021    Procedure: ULNA/RADIUS OPEN REDUCTION INTERNAL FIXATION;  Surgeon: Norman Brown MD;  Location: [facility];  Service: Orthopedics;  Laterality: Left;  ULNA/RADIAL OPEN REDUCTION INTERNAL FIXATION   • WRIST SURGERY           Visit Dx:    ICD-10-CM ICD-9-CM   1. Closed Colles' fracture of left radius with routine healing, subsequent encounter  S52.532D V54.12   2. Wrist pain, left  M25.532 719.43   3. Hand pain, left  M79.642 729.5        Patient History     Row Name 22 1400          Chief Complaint Difficulty with daily activities; Muscle weakness; Pain; Numbness; Tinglings  -EN    Type of Pain Hand pain; Wrist pain  -EN    Date Current Problem(s) Began --  Pt. fell 21, presented to ED 21, and surgery 6/15/21  -EN    Brief Description of Current Complaint Patient suffered a fall on 21 resulting in a distal radius fracture. Patient had ORIF 6/15/21 and participated in OT from 21 - 11/10/21. Patient was taking Pregabalin however in November began to have GI issues from the medication and it was discontinued by Dr. Brown. Patient had increased pain and sensitivity and decided to work on HEP at home independently.  "Patient reported eventually her hand and wrist started to have less pain and she had her Covid 19 booster and the pain in her wrist/hand intensified and she feels like her ROM has decreased. She spoke with Dr Brown and he encourged her to return to OT. Patient reports she continues to have pain and sensitivity but feels like her strength is \"pretty good\". Pt. reports if she squeezes certain parts of her hand/wrist it helps decrease the pain. Patient reports difficulty managing daily tasks and states her \"knuckles burn all the time\" and that she has numbness most all of the time.  -EN    Patient/Caregiver Goals Relieve pain; Return to prior level of function; Return to work; Improve strength; Decrease swelling  -EN    Patient/Caregiver Goals Comment To improve ROM/strength for independence with ADL/IADLs  -EN    Current Tobacco Use no  -EN    Patient's Rating of General Health Good  -EN    Hand Dominance left-handed  -EN    Occupation/sports/leisure activities works part time at [employer] on [employer] in the [place].  -EN    Patient seeing anyone else for problem(s)? --    How has patient tried to help current problem? \"I push on certain areas in my hand and wrist and it relieves the pain some.\"  -EN    What clinical tests have you had for this problem? X-ray; Other 1 (comment)  schedule to have nerve conduction study in March  -EN    Surgery/Hospitalization 6/15/21 ORIF left distal radius  -EN    Are you or can you be pregnant --              Pain Location --  mainly wrist/hand, \"but it shoots up arm\"  -EN    Pain at Present 5  -EN    Pain at Best 2  -EN    Pain at Worst 6  -EN    Pain Frequency Constant/continuous  -EN    Pain Description Burning; Shooting; Squeezing; Throbbing  -EN    What Performance Factors Make the Current Problem(s) WORSE? \"when I get my finger caught on something (digit 4)....when my sweater rubs on my scar.\"  -EN    What Performance Factors Make the Current Problem(s) BETTER? heat, massage, " "pressure at certain areas of wrist/hand  -EN    Is your sleep disturbed? 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 opening cans, buttoning, fastening bra, zippers, etc.. \"Sometimes I can do it and sometimes I can't.\"  -EN              Any falls in the past year: Yes  -EN    Number of falls reported in the last 12 months --  one fall which caused current fracture  -EN    Factors that contributed to the fall: Lost balance  -EN    Does patient have a fear of falling Yes (comment)  \"I take my time going up stairs...I'm very careful\"  -EN              Prior Rehab/Home Health Experiences No  -EN    Are you currently receiving Home Health services No  -EN    Do you plan to receive Home Health services in the near future No  -EN              Primary Language English  -EN    Are you able to read Yes  -EN    Are you able to write --  reports she has been writing some with the right hand, \"I can't write for long periods\"  -EN    How does patient learn best? Demonstration; Listening  -EN    Teaching needs identified Home Exercise Program; Management of Condition  -EN    Patient is concerned about/has problems with Difficulty with self care (i.e. bathing, dressing, toileting:; Grasping objects lifting; Performing home management (household chores, shopping, care of dependents); Performing job responsibilities/community activities (work, school,; Performing sports, recreation, and play activities  -EN    Barriers to learning None  -EN    Explanation of Functional Status Problem Patient reports she is using her left hand as much as she can and states ADLs are difficult. \"Sometimes I can button, zip...sometimes I can't. Patient states all activities with the left hand take extended time.  -EN    Pt Participated in POC and Goals Yes  -EN              Are you being hurt, hit, or frightened by anyone at home or in your life? No  -EN    Are you being neglected by a caregiver " "No  -EN    Have you had any of the following issues with --          User Key  (r) = Recorded By, (t) = Taken By, (c) = Cosigned By    Initials Name Provider Type    Bailee sEtrada OTR Occupational Therapist    patient Joan Block --                 OT Ortho     Row Name 01/19/22 1500 01/19/22 1400          Subjective Comments    Subjective Comments Patient reports she was doing so well after discharge until  her booster shot. \"It started hurting shortly after my booster....It felt like a tight band wrapped around my wrist and everything feels more sensitive.\"  -EN --            Subjective Pain    Able to rate subjective pain? yes  -EN --     Pre-Treatment Pain Level 5  -EN --     Post-Treatment Pain Level 2  -EN --            Posture/Observations    Posture/Observations Comments Patient holding elbow flexed at 90 degrees with forearm fully supinated. Left finger extension limited in fingers, especially digit 4.  Edema is mild in hand and wrist.  -EN --            Sensation    Additional Comments Patient hyper-sensitive to touch to digit 4 and around healed scar. Patient reports when her clothes rub on her scar it really bothers her. Patient's sensation assess with [place]. Patient able to identify monofilament 2.83 throughout hand/wrist however states she feels numb and sometimes has difficulty realizing when she's holding an object in her hand.  -EN --     Monofilaments Tested? Green  -EN --     Green Monofilament Interpretation Normal  -EN --            Body Part    Body Part Elbow/Forearm; Wrist/Hand  -EN --            Right Upper Ext    Rt Elbow Extension/Flexion AROM -- 0-135  -EN     Rt Elbow Supination AROM -- 88  -EN     Rt Elbow Pronation AROM -- 88  -EN     Rt Wrist Flexion AROM -- 76  -EN     Rt Wrist Extension AROM -- 70  -EN            Left Upper Ext    Lt Elbow Extension/Flexion AROM -- -  -EN     Lt Elbow Supination AROM -- 86  -EN     Lt Elbow Pronation AROM -- 19  -EN  "    Lt Wrist Flexion AROM -- 5  -EN     Lt Wrist Extension AROM -- 44  -EN            MMT Left Upper Ext    Lt Elbow Flexion MMT, Gross Movement -- (4+/5) good plus  -EN     Lt Elbow Extension MMT, Gross Movement -- (4+/5) good plus  -EN     Lt Forearm Supination MMT, Gross Movement -- (3+/5) fair plus  -EN     Lt Forearm Pronation MMT, Gross Movement -- (4-/5) good minus  -EN     Lt Wrist Flexion MMT, Gross Movement -- (4+/5) good plus  -EN     Lt Wrist Extension MMT, Gross Movement -- (4+/5) good plus  -EN     Lt Upper Extremity Comments  -- MMT assessed within patient's available range  -EN            RUE Edema - Circumference (cm)    Wrist Crease -- 15.3 cm  -EN            LUE Edema - Circumference (cm)    Wrist Crease -- 15.2 cm  -EN           User Key  (r) = Recorded By, (t) = Taken By, (c) = Cosigned By    Initials Name Provider Type    EN Bailee Casillas OTR Occupational Therapist                Hand Therapy (last 24 hours)     Hand Eval     Row Name 01/19/22 1400             Hand ROM Tested?    Hand ROM Tested? --  right hand AROM WNL  -EN              Left Extension AROM    II- MP AROM -16  -EN      II- PIP AROM -32  -EN      II- DIP AROM -10  -EN      II- BAPTISTE Left Extension AROM -58  -EN      III- MP AROM -14  -EN      III- PIP AROM -66  -EN      III- DIP AROM -43  -EN      III- BAPTISTE Left Extension AROM -123  -EN      IV- MP AROM -45  -EN      IV- PIP AROM -80  -EN      IV- DIP AROM -44  -EN      IV- BAPTISTE Left Extension AROM -169  -EN      V- MP AROM 0  -EN      V- PIP AROM -64  -EN      V- DIP AROM -37  -EN      V- BAPTISTE Left Extension AROM -101  -EN              Left Flexion AROM    II- MP AROM 49  -EN      II- PIP AROM 74  -EN      II- DIP AROM 20  -EN      II- BAPTISTE Left Flexion AROM 143  -EN      III- MP AROM 49  -EN      III- PIP AROM 96  -EN      III- DIP AROM 66  -EN      III- BAPTISTE Left Flexion AROM 211  -EN      IV- MP AROM 65  -EN      IV- PIP AROM 99  -EN      IV- DIP AROM 60  -EN      IV- BAPTISTE  Left Flexion AROM 224  -EN      V- MP AROM 35  -EN      V- PIP AROM 74  -EN      V- DIP AROM 60  -EN      V- BAPTISTE Left Flexion AROM 169  -EN              Left Thumb AROM    MP Flexion - AROM 35  -EN      IP Flexion - AROM 15  -EN      CMC Radial Abduction- AROM 20  -EN      CMC Opposition (cm)- AROM --  patient able to oppose thumb to pads of all digits  -EN               Strength Left    # Reps 1  -EN      Left Rung 2  -EN      Left  Test 1 10  -EN       Strength Average Left 10  -EN              Left Hand Strength - Pinch (lbs)    Lateral 8 lbs  -EN              Therapy Education    Education Details Patient educated on weight bearing exercises and desensitization. Patient given AROM exercises and isometric strengthening exercises  -EN      Given HEP; Symptoms/condition management; Pain management; Edema management  -EN      Program New; Reinforced  -EN      How Provided Verbal; Demonstration; Written  -EN      Provided to Patient  -EN      Level of Understanding Teach back education performed; Verbalized; Demonstrated  -EN            User Key  (r) = Recorded By, (t) = Taken By, (c) = Cosigned By    Initials Name Provider Type    EN Bailee Casillas OTR Occupational Therapist                    Therapy Education  Education Details: Patient educated on weight bearing exercises and desensitization. Patient given AROM exercises and isometric strengthening exercises  Given: HEP, Symptoms/condition management, Pain management, Edema management  Program: New, Reinforced  How Provided: Verbal, Demonstration, Written  Provided to: Patient  Level of Understanding: Teach back education performed, Verbalized, Demonstrated     OT Goals     Row Name 01/19/22 1600          OT Short Term Goals    STG Date to Achieve 02/16/22  -EN     STG 1 Patient will demonstrate independence with HEP including ROM, weight bearing, edema management, desensitization, strengthening  -EN     STG 1 Progress New  -EN     STG 2  "Patient will improve BAPTISTE in L digits 2-5 by 25 degrees for improved grasp of objects.  -EN     STG 2 Progress New  -EN     STG 3 Patient will improve wrist flexion 15 degrees for improved independence with ADL/IADLs.  -EN     STG 3 Progress New  -EN     STG 4 Patient will report decreaesd pain during activity by 25%.  -EN     STG 4 Progress New  -EN     STG 5 Patient will improve forearm pronation 30 degrees.  -EN     STG 5 Progress New  -EN            Long Term Goals    LTG Date to Achieve 03/02/22  -EN     LTG 1 Patient will improve forearm pronation 50 degrees for improved independence with ADL/IADLs.  -EN     LTG 1 Progress New  -EN     LTG 2 Patient will improve left  strength 5-10 pounds for improved grasp of objects.  -EN     LTG 2 Progress New  -EN     LTG 3 Patient will report pain 2/10 or less during activity.  -EN     LTG 3 Progress New  -EN     LTG 4 Patient will report modified independence with ADL/IADLs.  -EN     LTG 4 Progress New  -EN           User Key  (r) = Recorded By, (t) = Taken By, (c) = Cosigned By    Initials Name Provider Type    Bailee Estrada OTR Occupational Therapist                 OT Assessment/Plan     Row Name 01/19/22 1557          OT Assessment    Functional Limitations Performance in leisure activities; Performance in self-care ADL; Performance in work activities; Limitation in home management  -EN     Impairments Edema; Muscle strength; Pain; Range of motion  -EN     Assessment Comments Patient presents to OT with hand/wrist pain, decreased hand/wrist/forearm/elbow AROM, mild edema, pain/sensitivity, and weakness from a distal radius fracture 6/6/21. Patient had therapy last fall and reports she was doing very well however following her Covid 19 booster shot noticed an increase in pain/sensitivity and a loss in functional ROM. 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 Patient reports at home she is having difficulty managing daily activities such as buttoning, using a can opener, and holding onto objects. Patient would benefit from OT to address sensitivity/pain, decreased ROM/strength and edema.  -EN     OT Diagnosis Pain in L UE, decreased ADL/IADL independence, Decreased ROM/strength L UE  -EN     OT Rehab Potential Good  -EN     Patient/caregiver participated in establishment of treatment plan and goals Yes  -EN     Patient would benefit from skilled therapy intervention Yes  -EN            OT Plan    OT Frequency 1x/week  Pt requested one time per week  -EN     Predicted Duration of Therapy Intervention (OT) 8 weeks  -EN     Planned CPT's? OT EVAL LOW COMPLEXITY: 87833; OT THER ACT EA 15 MIN: 18612UM; OT HOT/COLD PACK  -EN     Planned Therapy Interventions (Optional Details) home exercise program; patient/family education; ROM (Range of Motion); strengthening; other (see comments)  desensitization, weight bearing for pain management, edema management  -EN     OT Plan Comments Continue with plan of care.  -EN           User Key  (r) = Recorded By, (t) = Taken By, (c) = Cosigned By    Initials Name Provider Type    EN Bailee Casillas OTR Occupational Therapist                                 Time Calculation:    OT Start Time: 1350  OT Stop Time: 1455  OT Time Calculation (min): 65 min     Therapy Charges for Today     Code Description Service Date Service Provider Modifiers Qty    69656416545  OT EVAL LOW COMPLEXITY 4 1/19/2022 Bailee Casillas OTR GO 1                  KENNETH Chavez  1/19/2022  "

## 2022-02-02 ENCOUNTER — HOSPITAL ENCOUNTER (OUTPATIENT)
Dept: OCCUPATIONAL THERAPY | Facility: HOSPITAL | Age: 72
Setting detail: THERAPIES SERIES
Discharge: HOME | End: 2022-02-02

## 2022-02-02 DIAGNOSIS — M25.532 WRIST PAIN, LEFT: ICD-10-CM

## 2022-02-02 DIAGNOSIS — S52.532D CLOSED COLLES' FRACTURE OF LEFT RADIUS WITH ROUTINE HEALING, SUBSEQUENT ENCOUNTER: Primary | ICD-10-CM

## 2022-02-02 DIAGNOSIS — M79.642 HAND PAIN, LEFT: ICD-10-CM

## 2022-02-02 PROCEDURE — 97530 THERAPEUTIC ACTIVITIES: CPT

## 2022-02-02 NOTE — THERAPY TREATMENT NOTE
"Outpatient Occupational Therapy Ortho Treatment Note   [place]     Patient Name: Joan Block  : 1950  MRN: [id number]  Today's Date: 2022        Visit Date: 2022    Patient Active Problem List   Diagnosis   • Distal radial fracture   • Closed fracture of left distal radius   • Fracture, Colles, left, closed   • Essential hypertension   • Mixed hyperlipidemia        Past Medical History:   Diagnosis Date   • Arthritis of back 8 yrs ago   • Fracture of wrist 2021   • Fracture, radius 2021   • Fracture, ulna 2021   • Hyperlipidemia    • Hypertension    • Tendinitis of knee         Past Surgical History:   Procedure Laterality Date   • APPENDECTOMY     • FOOT SURGERY     • KNEE SURGERY     • ORIF ULNA/RADIUS FRACTURES Left 6/15/2021    Procedure: ULNA/RADIUS OPEN REDUCTION INTERNAL FIXATION;  Surgeon: Norman Brown MD;  Location: [facility];  Service: Orthopedics;  Laterality: Left;  ULNA/RADIAL OPEN REDUCTION INTERNAL FIXATION   • WRIST SURGERY           Visit Dx:    ICD-10-CM ICD-9-CM   1. Closed Colles' fracture of left radius with routine healing, subsequent encounter  S52.532D V54.12   2. Wrist pain, left  M25.532 719.43   3. Hand pain, left  M79.642 729.5           Hand Therapy (last 24 hours)     Hand David     Row Name 22 1300             Subjective Comments    Subjective Comments Patient reports her main goal is to extend her ring finger, \"It gets caught on everything...it's still so sensitive.\"  -EN              Left Extension AROM    IV- MP AROM -26  -EN      IV- PIP AROM -80  -EN      IV- DIP AROM -35  -EN      IV- BAPTISTE Left Extension AROM -141  -EN              Therapy Education    Education Details Educated on aluminum finger splint, gradually opening up for increased digit 4 extension. Encouraged patient to try TENs unit again.  -EN      Given HEP; Symptoms/condition management; Edema management  -EN      Program Reinforced; Progressed  -EN      " "How Provided Verbal; Demonstration  -EN      Provided to Patient  -EN      Level of Understanding Teach back education performed; Verbalized; Demonstrated  -EN            User Key  (r) = Recorded By, (t) = Taken By, (c) = Cosigned By    Initials Name Provider Type    Bailee Estrada, OTR Occupational Therapist                    Therapy Education  Education Details: Educated on aluminum finger splint, gradually opening up for increased digit 4 extension. Encouraged patient to try TENs unit again.  Given: HEP, Symptoms/condition management, Edema management  Program: Reinforced, Progressed  How Provided: Verbal, Demonstration  Provided to: Patient  Level of Understanding: Teach back education performed, Verbalized, Demonstrated     OT Assessment/Plan     Row Name 02/02/22 1434 02/02/22 1412       OT Assessment    Functional Limitations -- Performance in leisure activities; Performance in self-care ADL; Performance in work activities; Limitation in home management  -EN    Impairments -- Edema; Muscle strength; Pain; Range of motion  -EN    Assessment Comments Patient reports her main goal is to improve L digit 4 extension. Patient reports, \"If it would open up a little more I wouldn't get it caught in things.\" Patient reports the only sensitivity/pain she has is in digit 4. Patient given splint to wear for improving extension. Patient did demonstrate improved extension in L digit 4 DIP and MCP however PIP unchanged. Patient encourgaged to try to wear her TENs unit again but patient stated, \"I don't like that buzzing feeling.\" Forearm and elbow strengthening exercises given per patient request for improving her arm strength to shut her car door.  -EN --       OT Plan    OT Plan Comments This therapist on vacation next week, patient stated she will return in 2 weeks. Reinforced HEP and splint wear.  -EN --          User Key  (r) = Recorded By, (t) = Taken By, (c) = Cosigned By    Initials Name Provider Type    " EN Bailee Casillas, OTR Occupational Therapist                  OT Goals     Row Name 02/02/22 1300          OT Short Term Goals    STG Date to Achieve 02/16/22  -EN     STG 1 Patient will demonstrate independence with HEP including ROM, weight bearing, edema management, desensitization, strengthening  -EN     STG 1 Progress Progressing; Ongoing  -EN     STG 2 Patient will improve BAPTISTE in L digits 2-5 by 25 degrees for improved grasp of objects.  -EN     STG 2 Progress Progressing; Ongoing  -EN     STG 3 Patient will improve wrist flexion 15 degrees for improved independence with ADL/IADLs.  -EN     STG 3 Progress Ongoing  -EN     STG 4 Patient will report decreaesd pain during activity by 25%.  -EN     STG 4 Progress Ongoing  -EN     STG 5 Patient will improve forearm pronation 30 degrees.  -EN     STG 5 Progress Ongoing  -EN            Long Term Goals    LTG Date to Achieve 03/02/22  -EN     LTG 1 Patient will improve forearm pronation 50 degrees for improved independence with ADL/IADLs.  -EN     LTG 1 Progress Ongoing  -EN     LTG 2 Patient will improve left  strength 5-10 pounds for improved grasp of objects.  -EN     LTG 2 Progress Ongoing  -EN     LTG 3 Patient will report pain 2/10 or less during activity.  -EN     LTG 3 Progress Ongoing  -EN     LTG 4 Patient will report modified independence with ADL/IADLs.  -EN     LTG 4 Progress Ongoing  -EN           User Key  (r) = Recorded By, (t) = Taken By, (c) = Cosigned By    Initials Name Provider Type    EN Bailee Casillas, OTR Occupational Therapist                 Modalities     Row Name 02/02/22 1300             Moist Heat    MH Applied Yes  -EN      Location left hand/wrist  -EN      OT Moist Heat Minutes 15  -EN      MH Prior to Rx Yes  -EN            User Key  (r) = Recorded By, (t) = Taken By, (c) = Cosigned By    Initials Name Provider Type    EN Bailee Casillas, OTR Occupational Therapist               OT Exercises     Row Name 02/02/22  1400             Subjective Comments    Subjective Comments Patient reported her scar is much better since she started wearing silicone gel pad([employer]). Stated digit 4 is still very sensitive and she has pain  with stretchesor if it gets caught on something.  -EN              Subjective Pain    Able to rate subjective pain? yes  -EN      Pre-Treatment Pain Level 0  -EN      Post-Treatment Pain Level 0  -EN      Subjective Pain Comment Patient reports no pain at rest. Stated she's taking a vitamin for nerves and it seems to be helping with sensitivity in digit 4.  -EN              Exercise 2    Exercise Name 2 ROM exercises (AROM, A/AROM, and PROM) of forearm, wrist and digits (left).  -EN      Cueing 2 Verbal; Tactile; Demo  -EN      Sets 2 5  -EN      Reps 2 15  -EN              Exercise 3    Exercise Name 3 Joint compression exercises, isolated joints of hand/wrist. Patient unable to fully extend hand on table for weight bearing activities  -EN              Exercise 4    Exercise Name 4 splinting- patient able to wear aluminum finger splint on digit 4 with instruction for wearing and adjusting for improved extension  -EN      Cueing 4 Verbal; Tactile; Demo  -EN            User Key  (r) = Recorded By, (t) = Taken By, (c) = Cosigned By    Initials Name Provider Type    Bailee Estrada OTR Occupational Therapist                              Time Calculation:   OT Start Time: 1250  OT Stop Time: 1355  OT Time Calculation (min): 65 min  Timed Charges  35272 - OT Therapeutic Activity Minutes: 45  Untimed Charges  OT Moist Heat Minutes: 15  Total Minutes  Timed Charges Total Minutes: 45  Untimed Charges Total Minutes: 15   Total Minutes: 45     Therapy Charges for Today     Code Description Service Date Service Provider Modifiers Qty    98490182207  OT THERAPEUTIC ACT EA 15 MIN 2/2/2022 Bailee Casillas OTR GO 3    [id number]  OT HOT OR COLD PACK TREAT [facility] 2/2/2022 Bailee Casillas OTR GO 1                     Bailee Casillas, OTR  2/2/2022

## 2022-02-15 ENCOUNTER — OFFICE VISIT (OUTPATIENT)
Dept: FAMILY MEDICINE CLINIC | Facility: CLINIC | Age: 72
End: 2022-02-15

## 2022-02-15 VITALS
BODY MASS INDEX: 34.6 KG/M2 | OXYGEN SATURATION: 99 % | TEMPERATURE: 96.8 F | HEIGHT: 62 IN | DIASTOLIC BLOOD PRESSURE: 100 MMHG | SYSTOLIC BLOOD PRESSURE: 170 MMHG | WEIGHT: 188 LBS | HEART RATE: 69 BPM

## 2022-02-15 DIAGNOSIS — E78.2 MIXED HYPERLIPIDEMIA: Chronic | ICD-10-CM

## 2022-02-15 DIAGNOSIS — I10 ESSENTIAL HYPERTENSION: Primary | Chronic | ICD-10-CM

## 2022-02-15 PROCEDURE — 99214 OFFICE O/P EST MOD 30 MIN: CPT | Performed by: FAMILY MEDICINE

## 2022-02-15 NOTE — PROGRESS NOTES
Chief Complaint   Patient presents with   • Hypertension   • Establish Care       Subjective   Joan Block is a 71 y.o. female.     History of Present Illness   71-year-old female here to establish care as a new patient    Has medical history of hypertension, hyperlipidemia, fracture of wrist, osteopenia    Hypertension: Recently somewhat more uncontrolled.  She used to be on amlodipine as well as lisinopril 20.  She is now on lisinopril 10 mg.  Asymptomatic aside from feeling of hotness periodically, normal CBC    Hyperlipidemia stable on atorvastatin 10 mg    Also takes  -pregabalin 75mg capsule once a week per Dr flores for Left wrist nerve pain, she is awaiting EMG studies  -Vit D    The following portions of the patient's history were reviewed and updated as appropriate: allergies, current medications, past family history, past medical history, past social history, past surgical history and problem list.      Past Medical History:   Diagnosis Date   • Arthritis of back 8 yrs ago   • Fracture of wrist June 7 2021   • Fracture, radius June 7 2021   • Fracture, ulna June 7 2021   • Hyperlipidemia    • Hypertension    • Neuromuscular disorder (HCC) 2021   • Osteopenia 6/2020   • Tendinitis of knee 2019       Past Surgical History:   Procedure Laterality Date   • APPENDECTOMY     • FOOT SURGERY     • FRACTURE SURGERY  2021   • KNEE SURGERY     • ORIF ULNA/RADIUS FRACTURES Left 6/15/2021    Procedure: ULNA/RADIUS OPEN REDUCTION INTERNAL FIXATION;  Surgeon: Norman Brown MD;  Location: [facility];  Service: Orthopedics;  Laterality: Left;  ULNA/RADIAL OPEN REDUCTION INTERNAL FIXATION   • TONSILLECTOMY  1960   • WRIST SURGERY  2021       Family History   Problem Relation Age of Onset   • Cancer Mother    • Breast cancer Mother    • Hyperlipidemia Mother         Both parents   • Diabetes Father        Social History     Socioeconomic History   • Marital status:    Tobacco Use   • Smoking status: Former Smoker      Packs/day: 0.50     Years: 45.00     Pack years: 22.50     Types: Cigarettes     Start date: 1970     Quit date: 3/2/2015     Years since quittin.9   • Smokeless tobacco: Never Used   Vaping Use   • Vaping Use: Never used   Substance and Sexual Activity   • Alcohol use: No   • Drug use: No   • Sexual activity: Not Currently     Partners: Male     Birth control/protection: Post-menopausal       Current Outpatient Medications on File Prior to Visit   Medication Sig Dispense Refill   • atorvastatin (LIPITOR) 10 MG tablet Take 1 tablet by mouth Daily. 30 tablet 3   • lisinopril (PRINIVIL,ZESTRIL) 10 MG tablet Take 1 tablet by mouth Daily. 30 tablet 3     No current facility-administered medications on file prior to visit.       Review of Systems   Constitutional: Negative for fever.   HENT: Negative for congestion.    Respiratory: Negative for shortness of breath.    Cardiovascular: Negative for chest pain.   Gastrointestinal: Negative for abdominal pain.   Genitourinary: Negative for decreased urine volume.   Musculoskeletal: Negative for back pain.   Neurological: Negative for weakness.   Psychiatric/Behavioral: Negative for depressed mood.           Vitals:    02/15/22 0815   BP: 170/100   Pulse: 69   Temp: 96.8 °F (36 °C)   SpO2: 99%      Objective   Physical Exam  Vitals and nursing note reviewed.   Constitutional:       Appearance: She is well-developed.   HENT:      Head: Normocephalic and atraumatic.   Eyes:      Conjunctiva/sclera: Conjunctivae normal.      Pupils: Pupils are equal, round, and reactive to light.   Cardiovascular:      Rate and Rhythm: Normal rate and regular rhythm.      Heart sounds: Normal heart sounds. No murmur heard.      Pulmonary:      Effort: Pulmonary effort is normal. No respiratory distress.      Breath sounds: Normal breath sounds.   Abdominal:      General: Bowel sounds are normal.      Palpations: Abdomen is soft.   Musculoskeletal:         General: Normal range of  motion.      Cervical back: Neck supple.   Skin:     General: Skin is warm and dry.   Neurological:      Mental Status: She is alert and oriented to person, place, and time.           Assessment/Plan   Problems Addressed this Visit        Cardiac and Vasculature    Essential hypertension - Primary (Chronic)    Mixed hyperlipidemia (Chronic)      Diagnoses       Codes Comments    Essential hypertension    -  Primary ICD-10-CM: I10  ICD-9-CM: 401.9     Mixed hyperlipidemia     ICD-10-CM: E78.2  ICD-9-CM: 272.2         Stable hyperlipidemia  Hypertension: Uncontrolled.  Increasing lisinopril to 20 mg once a day.  We will have her use up the prescription she has left and follow-up for medical assistant blood pressure check in 3 weeks    Reviewed her recent blood work with normal renal function.  Normal blood sugars normal blood counts from 10/12/21      Qiana Yee MD

## 2022-02-16 ENCOUNTER — PATIENT ROUNDING (BHMG ONLY) (OUTPATIENT)
Dept: FAMILY MEDICINE CLINIC | Facility: CLINIC | Age: 72
End: 2022-02-16

## 2022-02-24 ENCOUNTER — OFFICE VISIT (OUTPATIENT)
Dept: ORTHOPEDIC SURGERY | Facility: CLINIC | Age: 72
End: 2022-02-24

## 2022-02-24 VITALS — WEIGHT: 188 LBS | BODY MASS INDEX: 34.6 KG/M2 | HEIGHT: 62 IN

## 2022-02-24 DIAGNOSIS — G90.50 RSD (REFLEX SYMPATHETIC DYSTROPHY): ICD-10-CM

## 2022-02-24 DIAGNOSIS — S52.532A CLOSED COLLES' FRACTURE OF LEFT RADIUS, INITIAL ENCOUNTER: Primary | ICD-10-CM

## 2022-02-24 DIAGNOSIS — Z09 STATUS POST ORTHOPEDIC SURGERY, FOLLOW-UP EXAM: ICD-10-CM

## 2022-02-24 DIAGNOSIS — R52 PAIN: ICD-10-CM

## 2022-02-24 PROCEDURE — 99213 OFFICE O/P EST LOW 20 MIN: CPT | Performed by: ORTHOPAEDIC SURGERY

## 2022-02-24 RX ORDER — PREGABALIN 25 MG/1
25 CAPSULE ORAL 2 TIMES DAILY
Qty: 60 CAPSULE | Refills: 0 | Status: SHIPPED | OUTPATIENT
Start: 2022-02-24 | End: 2022-07-21 | Stop reason: SDUPTHER

## 2022-02-24 NOTE — PROGRESS NOTES
Subjective: Status post ORIF left Colles' fracture with RSD     Patient ID: Joan Block is a 71 y.o. female.    Chief Complaint:    History of Present Illness patient is almost 8 months out.  The hours doing stiffness in the hand persist.  She is still having pain that she controls for the most part with ibuprofen.  She states the Lyrica that she took wrote much better as far as relieving her pain.  Again she has significant stiffness in her fingers which really has not changed in the past couple of months.  Her EMG is scheduled for next month.       Social History     Occupational History   • Not on file   Tobacco Use   • Smoking status: Former Smoker     Packs/day: 0.50     Years: 45.00     Pack years: 22.50     Types: Cigarettes     Start date: 1970     Quit date: 3/2/2015     Years since quittin.9   • Smokeless tobacco: Never Used   Vaping Use   • Vaping Use: Never used   Substance and Sexual Activity   • Alcohol use: No   • Drug use: No   • Sexual activity: Not Currently     Partners: Male     Birth control/protection: Post-menopausal      Review of Systems   Constitutional: Negative for chills, diaphoresis, fever and unexpected weight change.   HENT: Negative for hearing loss, nosebleeds, sore throat and tinnitus.    Eyes: Negative for pain and visual disturbance.   Respiratory: Negative for cough, shortness of breath and wheezing.    Cardiovascular: Negative for chest pain and palpitations.   Gastrointestinal: Negative for abdominal pain, diarrhea, nausea and vomiting.   Endocrine: Negative for cold intolerance, heat intolerance and polydipsia.   Genitourinary: Negative for difficulty urinating, dysuria and hematuria.   Musculoskeletal: Positive for arthralgias and myalgias. Negative for joint swelling.   Skin: Negative for rash and wound.   Allergic/Immunologic: Negative for environmental allergies.   Neurological: Negative for dizziness, syncope and numbness.   Hematological: Does not  bruise/bleed easily.   Psychiatric/Behavioral: Negative for dysphoric mood and sleep disturbance. The patient is not nervous/anxious.          Past Medical History:   Diagnosis Date   • Arthritis of back 8 yrs ago   • Fracture of wrist June 7 2021   • Fracture, radius June 7 2021   • Fracture, ulna June 7 2021   • Hyperlipidemia    • Hypertension    • Neuromuscular disorder (HCC) 2021   • Osteopenia 6/2020   • Tendinitis of knee 2019     Past Surgical History:   Procedure Laterality Date   • APPENDECTOMY     • FOOT SURGERY     • FRACTURE SURGERY  2021   • KNEE SURGERY     • ORIF ULNA/RADIUS FRACTURES Left 6/15/2021    Procedure: ULNA/RADIUS OPEN REDUCTION INTERNAL FIXATION;  Surgeon: Norman Brown MD;  Location: [facility];  Service: Orthopedics;  Laterality: Left;  ULNA/RADIAL OPEN REDUCTION INTERNAL FIXATION   • TONSILLECTOMY  1960   • WRIST SURGERY  2021     Family History   Problem Relation Age of Onset   • Cancer Mother    • Breast cancer Mother    • Hyperlipidemia Mother         Both parents   • Diabetes Father          Objective:  There were no vitals filed for this visit.      02/24/22  1259   Weight: 85.3 kg (188 lb)     Body mass index is 34.37 kg/m².        Ortho Exam   She is alert and oriented x3.  There is no motor deficit but she does have paresthesia in all digits although it is different digits at different times.  She has contracture of all the digits and limited range of motion of the wrist.  Skin is cool to touch.  There is a fusiform type swelling to the fingers.    Assessment:        1. Closed Colles' fracture of left radius, initial encounter    2. Status post orthopedic surgery, follow-up exam    3. Pain    4. RSD (reflex sympathetic dystrophy)           Plan: She is going to get the EMG in March.  I can give her a low-dose of the Lyrica 25 mg twice a day to see if that can better control her pain and make a referral to pain management to see if they feel that is appropriate for long-term  use.  She is able to do the things that she needs to do despite the the stiffness and the pain.  At this point I do think further physical therapy can be of any value.  Return to see me as needed.  Patient was in agreement of treatment plan.            Work Status:    GIANA query complete.    Orders:  Orders Placed This Encounter   Procedures   • Ambulatory Referral to Pain Management       Medications:  New Medications Ordered This Visit   Medications   • pregabalin (Lyrica) 25 MG capsule     Sig: Take 1 capsule by mouth 2 (Two) Times a Day.     Dispense:  60 capsule     Refill:  0       Followup:  Return if symptoms worsen or fail to improve.          Dictated utilizing Dragon dictation

## 2022-03-07 ENCOUNTER — CLINICAL SUPPORT (OUTPATIENT)
Dept: FAMILY MEDICINE CLINIC | Facility: CLINIC | Age: 72
End: 2022-03-07

## 2022-03-07 VITALS — DIASTOLIC BLOOD PRESSURE: 84 MMHG | SYSTOLIC BLOOD PRESSURE: 130 MMHG

## 2022-03-10 ENCOUNTER — HOSPITAL ENCOUNTER (OUTPATIENT)
Dept: INFUSION THERAPY | Facility: HOSPITAL | Age: 72
Discharge: HOME | End: 2022-03-10
Admitting: PSYCHIATRY & NEUROLOGY

## 2022-03-10 DIAGNOSIS — R52 PAIN: ICD-10-CM

## 2022-03-10 DIAGNOSIS — S52.532A CLOSED COLLES' FRACTURE OF LEFT RADIUS, INITIAL ENCOUNTER: ICD-10-CM

## 2022-03-10 DIAGNOSIS — Z09 STATUS POST ORTHOPEDIC SURGERY, FOLLOW-UP EXAM: ICD-10-CM

## 2022-03-10 PROCEDURE — 95886 MUSC TEST DONE W/N TEST COMP: CPT

## 2022-03-10 PROCEDURE — 95909 NRV CNDJ TST 5-6 STUDIES: CPT | Performed by: PSYCHIATRY & NEUROLOGY

## 2022-03-10 PROCEDURE — 95909 NRV CNDJ TST 5-6 STUDIES: CPT

## 2022-03-10 PROCEDURE — 95886 MUSC TEST DONE W/N TEST COMP: CPT | Performed by: PSYCHIATRY & NEUROLOGY

## 2022-03-10 NOTE — PROCEDURES
EMG and Nerve Conduction Studies    I.      Instrument used: Neuromax 1002  II.     Please see data sheets for tabular summary of NCS and details on methods, temperatures and lab standards.   III.    EMG muscles tested for upper extremity studies include the deltoid, biceps, triceps, pronator teres, extensor digitorum communis, first dorsal interosseous and abductor pollicis brevis.    IV.   EMG muscles tested for lower extremity studies include the vastus lateralis, tibialis anterior, peroneus longus, medial gastrocnemius and extensor digitorum brevis.    V.    Additional muscles tested as needed.  Paraspinal muscles tested as needed.   VI.   Please see data sheets for tabular summary of EMG findings.   VII. The complete report includes the data sheets.      Indication: Pain numbness tingling left hand  History: 71-year-old woman who in June of last year fell and fractured her left wrist, both bones requiring open reduction internal fixation.  She has pain numbness and tingling in the left hand along with some flexion contracture of D4 and was diagnosed with RSD.      Ht: 157.5 cm  Wt: 85.3 kg  HbA1C: No results found for: HGBA1C  TSH:   Lab Results   Component Value Date    TSH 1.990 10/12/2021       Technical summary:  Nerve conduction studies were obtained in the left hand.  Skin temperatures were at least 32 °C measured on the palm.  Needle examination was obtained on selected muscles in the left arm.    Results:  1.  Normal left median antidromic sensory distal latency and amplitude.  2.  Normal left ulnar antidromic sensory distal latency and amplitude.  3.  Normal left radial sensory distal latency and amplitude  4.  Normal left median motor distal latency, conduction velocity and amplitudes.  5.  Normal left ulnar motor conduction velocities, distal latency and amplitudes.  6.  Needle examination of selected muscles in the left arm showed normal insertional activities throughout.  There were normal motor  units and recruitment patterns throughout.    Impression:  Normal study.  No evidence of a left median or ulnar neuropathy or cervical radiculopathy by this study.  Study results were discussed with the patient.    Hong Bailon M.D.              Dictated utilizing Dragon dictation.

## 2022-03-23 ENCOUNTER — DOCUMENTATION (OUTPATIENT)
Dept: OCCUPATIONAL THERAPY | Facility: HOSPITAL | Age: 72
End: 2022-03-23

## 2022-03-23 DIAGNOSIS — S52.532D CLOSED COLLES' FRACTURE OF LEFT RADIUS WITH ROUTINE HEALING, SUBSEQUENT ENCOUNTER: Primary | ICD-10-CM

## 2022-03-23 DIAGNOSIS — M79.642 HAND PAIN, LEFT: ICD-10-CM

## 2022-03-23 DIAGNOSIS — M25.532 WRIST PAIN, LEFT: ICD-10-CM

## 2022-03-23 NOTE — THERAPY DISCHARGE NOTE
Outpatient Occupational Therapy Discharge Summary         Patient Name: Joan Block  : 1950  MRN: [id number]    Today's Date: 3/23/2022      Visit Date: 2022       OT Goals     Row Name 22 1300          OT Short Term Goals    STG Date to Achieve 22  -EN     STG 1 Patient will demonstrate independence with HEP including ROM, weight bearing, edema management, desensitization, strengthening  -EN     STG 1 Progress Not Met  Patient cancelled last OT appointment and did not call to schedule further appointments.  -EN     STG 2 Patient will improve BAPTISTE in L digits 2-5 by 25 degrees for improved grasp of objects.  -EN     STG 2 Progress Not Met  -EN     STG 3 Patient will improve wrist flexion 15 degrees for improved independence with ADL/IADLs.  -EN     STG 3 Progress Not Met  -EN     STG 4 Patient will report decreaesd pain during activity by 25%.  -EN     STG 4 Progress Not Met  -EN     STG 5 Patient will improve forearm pronation 30 degrees.  -EN     STG 5 Progress Not Met  -EN            Long Term Goals    LTG Date to Achieve 22  -EN     LTG 1 Patient will improve forearm pronation 50 degrees for improved independence with ADL/IADLs.  -EN     LTG 1 Progress Not Met  -EN     LTG 2 Patient will improve left  strength 5-10 pounds for improved grasp of objects.  -EN     LTG 2 Progress Not Met  -EN     LTG 3 Patient will report pain 2/10 or less during activity.  -EN     LTG 3 Progress Not Met  -EN     LTG 4 Patient will report modified independence with ADL/IADLs.  -EN     LTG 4 Progress Not Met  -EN           User Key  (r) = Recorded By, (t) = Taken By, (c) = Cosigned By    Initials Name Provider Type    Bailee Estrada OTR Occupational Therapist                 OP OT Discharge Summary  Reason for Discharge: other (comment) (Patient last seen by OT on 22. Patient canceled following appointment and did not reschedule.)      Time Calculation:                          Bailee Casillas, OTR   3/23/2022

## 2022-06-28 ENCOUNTER — OFFICE VISIT (OUTPATIENT)
Dept: FAMILY MEDICINE CLINIC | Facility: CLINIC | Age: 72
End: 2022-06-28

## 2022-06-28 VITALS
WEIGHT: 188 LBS | HEIGHT: 62 IN | HEART RATE: 70 BPM | BODY MASS INDEX: 34.6 KG/M2 | DIASTOLIC BLOOD PRESSURE: 90 MMHG | SYSTOLIC BLOOD PRESSURE: 144 MMHG | TEMPERATURE: 97.3 F | OXYGEN SATURATION: 99 %

## 2022-06-28 DIAGNOSIS — M54.31 RIGHT SIDED SCIATICA: Primary | ICD-10-CM

## 2022-06-28 PROCEDURE — 96372 THER/PROPH/DIAG INJ SC/IM: CPT | Performed by: FAMILY MEDICINE

## 2022-06-28 PROCEDURE — 99214 OFFICE O/P EST MOD 30 MIN: CPT | Performed by: FAMILY MEDICINE

## 2022-06-28 RX ORDER — DOCUSATE SODIUM 100 MG/1
100 CAPSULE, LIQUID FILLED ORAL 2 TIMES DAILY
Qty: 30 CAPSULE | Refills: 0 | Status: SHIPPED | OUTPATIENT
Start: 2022-06-28 | End: 2022-10-25

## 2022-06-28 RX ORDER — LISINOPRIL 10 MG/1
10 TABLET ORAL DAILY
Qty: 90 TABLET | Refills: 1 | Status: SHIPPED | OUTPATIENT
Start: 2022-06-28 | End: 2022-10-26 | Stop reason: SDUPTHER

## 2022-06-28 RX ORDER — PREDNISONE 20 MG/1
40 TABLET ORAL DAILY
Qty: 10 TABLET | Refills: 0 | Status: SHIPPED | OUTPATIENT
Start: 2022-06-28 | End: 2022-07-03

## 2022-06-28 RX ORDER — TRIAMCINOLONE ACETONIDE 40 MG/ML
40 INJECTION, SUSPENSION INTRA-ARTICULAR; INTRAMUSCULAR ONCE
Status: COMPLETED | OUTPATIENT
Start: 2022-06-28 | End: 2022-06-28

## 2022-06-28 RX ORDER — TRAMADOL HYDROCHLORIDE 50 MG/1
50 TABLET ORAL EVERY 8 HOURS PRN
Qty: 10 TABLET | Refills: 0 | Status: SHIPPED | OUTPATIENT
Start: 2022-06-28 | End: 2022-07-01

## 2022-06-28 RX ADMIN — TRIAMCINOLONE ACETONIDE 40 MG: 40 INJECTION, SUSPENSION INTRA-ARTICULAR; INTRAMUSCULAR at 14:33

## 2022-06-28 NOTE — PROGRESS NOTES
Chief Complaint   Patient presents with   • Back Pain       Subjective   Joan Block is a 71 y.o. female.     History of Present Illness   Acute add-on visit for right-sided sciatica flareup    Started 3 days prior.  Has slowly gotten better.  Pain down the right buttock.  She has been doing sciatica exercises but was feeling well prior to this flare.  No weakness.  No numbness or tingling.  What typically helps her is a day or 2 tramadol.      The following portions of the patient's history were reviewed and updated as appropriate: allergies, current medications, past family history, past medical history, past social history, past surgical history and problem list.      Past Medical History:   Diagnosis Date   • Arthritis of back 8 yrs ago   • Fracture of wrist 2021   • Fracture, radius 2021   • Fracture, ulna 2021   • Hyperlipidemia    • Hypertension    • Neuromuscular disorder (HCC)    • Osteopenia 2020   • Tendinitis of knee        Past Surgical History:   Procedure Laterality Date   • APPENDECTOMY     • FOOT SURGERY     • FRACTURE SURGERY     • KNEE SURGERY     • ORIF ULNA/RADIUS FRACTURES Left 6/15/2021    Procedure: ULNA/RADIUS OPEN REDUCTION INTERNAL FIXATION;  Surgeon: Norman Brown MD;  Location: [facility];  Service: Orthopedics;  Laterality: Left;  ULNA/RADIAL OPEN REDUCTION INTERNAL FIXATION   • TONSILLECTOMY     • WRIST SURGERY         Family History   Problem Relation Age of Onset   • Cancer Mother    • Breast cancer Mother    • Hyperlipidemia Mother         Both parents   • Diabetes Father        Social History     Socioeconomic History   • Marital status:    Tobacco Use   • Smoking status: Former Smoker     Packs/day: 0.50     Years: 45.00     Pack years: 22.50     Types: Cigarettes     Start date: 1970     Quit date: 3/2/2015     Years since quittin.3   • Smokeless tobacco: Never Used   Vaping Use   • Vaping Use: Never used   Substance  and Sexual Activity   • Alcohol use: No   • Drug use: No   • Sexual activity: Not Currently     Partners: Male     Birth control/protection: Post-menopausal       Current Outpatient Medications on File Prior to Visit   Medication Sig Dispense Refill   • pregabalin (Lyrica) 25 MG capsule Take 1 capsule by mouth 2 (Two) Times a Day. 60 capsule 0   • [DISCONTINUED] lisinopril (PRINIVIL,ZESTRIL) 10 MG tablet Take 1 tablet by mouth Daily. 30 tablet 3   • [DISCONTINUED] atorvastatin (LIPITOR) 10 MG tablet Take 1 tablet by mouth Daily. 30 tablet 3     No current facility-administered medications on file prior to visit.       Review of Systems   Constitutional: Negative for fever.   HENT: Negative for congestion.    Respiratory: Negative for shortness of breath.    Cardiovascular: Negative for chest pain.   Gastrointestinal: Negative for abdominal pain.   Genitourinary: Negative for decreased urine volume.   Musculoskeletal: Positive for back pain.   Neurological: Negative for weakness.           Vitals:    06/28/22 1349   BP: 144/90   Pulse: 70   Temp: 97.3 °F (36.3 °C)   SpO2: 99%      Objective   Physical Exam  Vitals reviewed.   Cardiovascular:      Rate and Rhythm: Normal rate.      Pulses: Normal pulses.   Pulmonary:      Effort: Pulmonary effort is normal.   Skin:     General: Skin is warm.      Capillary Refill: Capillary refill takes less than 2 seconds.   Neurological:      General: No focal deficit present.      Mental Status: She is alert.       No LE swelling or edema, no numbness or tingling of LE. No LE weakness    Assessment & Plan   Problems Addressed this Visit    None     Visit Diagnoses     Right sided sciatica    -  Primary    Relevant Medications    traMADol (ULTRAM) 50 MG tablet    triamcinolone acetonide (KENALOG-40) injection 40 mg (Start on 6/28/2022  2:09 PM)      Diagnoses       Codes Comments    Right sided sciatica    -  Primary ICD-10-CM: M54.31  ICD-9-CM: 724.3         May do tramadol with a  stool softener only 1 a day.  Restart sciatic exercises as soon as this resolves  May trial a low-dose of a steroid without other anti-inflammatories             Qiana Yee MD

## 2022-06-29 DIAGNOSIS — R11.0 NAUSEA: Primary | ICD-10-CM

## 2022-06-29 RX ORDER — ONDANSETRON 4 MG/1
4 TABLET, FILM COATED ORAL EVERY 8 HOURS PRN
Qty: 15 TABLET | Refills: 3 | Status: SHIPPED | OUTPATIENT
Start: 2022-06-29 | End: 2022-10-25

## 2022-07-11 DIAGNOSIS — M54.31 RIGHT SIDED SCIATICA: Primary | ICD-10-CM

## 2022-07-11 RX ORDER — CYCLOBENZAPRINE HCL 5 MG
5 TABLET ORAL 2 TIMES DAILY PRN
Qty: 60 TABLET | Refills: 2 | Status: SHIPPED | OUTPATIENT
Start: 2022-07-11 | End: 2022-10-25

## 2022-07-21 ENCOUNTER — OFFICE VISIT (OUTPATIENT)
Dept: ORTHOPEDIC SURGERY | Facility: CLINIC | Age: 72
End: 2022-07-21

## 2022-07-21 ENCOUNTER — PATIENT MESSAGE (OUTPATIENT)
Dept: ORTHOPEDIC SURGERY | Facility: CLINIC | Age: 72
End: 2022-07-21

## 2022-07-21 VITALS
SYSTOLIC BLOOD PRESSURE: 137 MMHG | HEIGHT: 62 IN | WEIGHT: 188 LBS | DIASTOLIC BLOOD PRESSURE: 83 MMHG | BODY MASS INDEX: 34.6 KG/M2 | HEART RATE: 99 BPM

## 2022-07-21 DIAGNOSIS — R52 PAIN: ICD-10-CM

## 2022-07-21 DIAGNOSIS — M25.551 RIGHT HIP PAIN: Primary | ICD-10-CM

## 2022-07-21 DIAGNOSIS — G90.50 RSD (REFLEX SYMPATHETIC DYSTROPHY): ICD-10-CM

## 2022-07-21 DIAGNOSIS — S52.532A CLOSED COLLES' FRACTURE OF LEFT RADIUS, INITIAL ENCOUNTER: ICD-10-CM

## 2022-07-21 DIAGNOSIS — Z09 STATUS POST ORTHOPEDIC SURGERY, FOLLOW-UP EXAM: ICD-10-CM

## 2022-07-21 DIAGNOSIS — S70.01XA CONTUSION OF RIGHT HIP, INITIAL ENCOUNTER: ICD-10-CM

## 2022-07-21 PROCEDURE — 99214 OFFICE O/P EST MOD 30 MIN: CPT | Performed by: ORTHOPAEDIC SURGERY

## 2022-07-21 PROCEDURE — 73502 X-RAY EXAM HIP UNI 2-3 VIEWS: CPT | Performed by: ORTHOPAEDIC SURGERY

## 2022-07-21 RX ORDER — PREGABALIN 25 MG/1
25 CAPSULE ORAL 2 TIMES DAILY
Qty: 60 CAPSULE | Refills: 0 | Status: SHIPPED | OUTPATIENT
Start: 2022-07-21 | End: 2022-10-25

## 2022-07-21 RX ORDER — METHYLPREDNISOLONE 4 MG/1
TABLET ORAL
Qty: 21 TABLET | Refills: 0 | Status: SHIPPED | OUTPATIENT
Start: 2022-07-21 | End: 2022-10-25

## 2022-07-21 NOTE — PROGRESS NOTES
Subjective: Right posterior hip pain     Patient ID: Joan Block is a 71 y.o. female.    Chief Complaint:    History of Present Illness 71-year-old female known to me presents with a new problem involving her right hip.  States back on  when attempting to get out of the commode she sprained her ankle in the right.  Has persisted.  Describes the pain as 2 out of 10 with occasional stabbing discomfort in the posterior gluteal area.  States the pain sometimes will radiate down the side of the right leg but not below the knee.  No prior history of any trauma or injury.  As far as the left hand is concerned she did complete the imaging is normal and she states after she had that test all of the pain in the hand has resolved and she is doing well states she is able perform all activities of daily living without any limitation despite the fact some stiffness in her fingers       Social History     Occupational History   • Not on file   Tobacco Use   • Smoking status: Former Smoker     Packs/day: 0.50     Years: 45.00     Pack years: 22.50     Types: Cigarettes     Start date: 1970     Quit date: 3/2/2015     Years since quittin.3   • Smokeless tobacco: Never Used   Vaping Use   • Vaping Use: Never used   Substance and Sexual Activity   • Alcohol use: No   • Drug use: No   • Sexual activity: Not Currently     Partners: Male     Birth control/protection: Post-menopausal      Review of Systems      Past Medical History:   Diagnosis Date   • Arthritis of back 8 yrs ago   • Fracture of wrist 2021   • Fracture, radius 2021   • Fracture, ulna 2021   • Hyperlipidemia    • Hypertension    • Neuromuscular disorder (HCC)    • Osteopenia 2020   • Tendinitis of knee      Past Surgical History:   Procedure Laterality Date   • APPENDECTOMY     • FOOT SURGERY     • FRACTURE SURGERY     • KNEE SURGERY     • ORIF ULNA/RADIUS FRACTURES Left 6/15/2021    Procedure: ULNA/RADIUS OPEN  REDUCTION INTERNAL FIXATION;  Surgeon: Norman Brown MD;  Location: [facility];  Service: Orthopedics;  Laterality: Left;  ULNA/RADIAL OPEN REDUCTION INTERNAL FIXATION   • TONSILLECTOMY  1960   • WRIST SURGERY  2021     Family History   Problem Relation Age of Onset   • Cancer Mother    • Breast cancer Mother    • Hyperlipidemia Mother         Both parents   • Diabetes Father          Objective:  Vitals:    07/21/22 1108   BP: 137/83   Pulse: 99         07/21/22  1108   Weight: 85.3 kg (188 lb)     Body mass index is 34.39 kg/m².        Ortho Exam  AP of the pelvis and lateral right hip is completely within normal limits showing no acute or chronic changes.  No prior x-rays available for comparison.  She is alert and oriented x3.  She has no pain with passive internal or external rotation of the hip and she can internally rotate to 35 degrees which is a negative Stinchfield test.  Negative straight leg raise.  There is no tenderness over the greater trochanter but she does have pain with abduction against resistance over the posterior gluteal musculature.  Her calf is nontender.  She has good distal pulses no motor or sensory deficit the skin is cool to touch.  There is no ecchymosis or bruising.    Assessment:        1. Right hip pain    2. Contusion of right hip, initial encounter           Plan: Reviewed with the patient her x-rays history and physical exam.  She has a gluteal strain as result of this injury dating back to June.  He is taken an anti-inflammatory shot of start her on a Medrol Dosepak followed by resumption of this nonsteroidal anti-inflammatory.  Advised on moist heat.  She is told if she is not better in 3 weeks to call and arrange for physical therapy.  Patient was in agreement.  Answered all questions            Work Status:    GIANA query complete.    Orders:  Orders Placed This Encounter   Procedures   • XR Hip With or Without Pelvis 2 - 3 View Right       Medications:  New Medications  Ordered This Visit   Medications   • methylPREDNISolone (MEDROL) 4 MG dose pack     Sig: Use as directed by package instructions     Dispense:  21 tablet     Refill:  0       Followup:  Return if symptoms worsen or fail to improve.          Dictated utilizing Dragon dictation   Answers for HPI/ROS submitted by the patient on 7/15/2022  Please describe your symptoms.: Chiro fixed sciatica but continue to have pain in groin down right thigh then to shin. Weakness in right leg unable to walk. Both chiro and PC Said I needed specialist . Began June 27th  Have you had these symptoms before?: No  How long have you been having these symptoms?: Greater than 2 weeks  Please list any medications you are currently taking for this condition.: Pregablin, Cyclobenzapr, Motrin  What is the primary reason for your visit?: Other

## 2022-08-01 ENCOUNTER — TELEPHONE (OUTPATIENT)
Dept: ORTHOPEDIC SURGERY | Facility: CLINIC | Age: 72
End: 2022-08-01

## 2022-08-01 NOTE — TELEPHONE ENCOUNTER
Spoke w/patient.  She states that she has been alternating tylenol and motrin today and it has been helping some.  I reiterated moist heat.  She stated that it did feel better with the steroid pack; however, it started hurting two days after that.  I am gonna call her and let her know to continue this and give that steroid time to start working.  I saw where she had cancelled her appt for tomorrow.  If she isn't better by Thursday she will need to come in and be seen.                    ----- Message from Joan Block sent at 8/1/2022  9:23 AM EDT -----  Regarding: Right hip bursitis   Medol dose chris was good but the past 2 days and nights have been unable to rest due to shooting pains down side of leg to front of shin. Unable to drive at this point so unable to get to physical therapy. Any suggestions you have  to allieve pain would be appreciated. It has now been 6 wks

## 2022-09-01 ENCOUNTER — TELEPHONE (OUTPATIENT)
Dept: FAMILY MEDICINE CLINIC | Facility: CLINIC | Age: 72
End: 2022-09-01

## 2022-09-01 NOTE — TELEPHONE ENCOUNTER
Hub staff attempted to follow warm transfer process and was unsuccessful     Caller: Joan Block    Relationship to patient: Self    Best call back number: [phone]    Patient is needing: PLEASE CALL PATIENT TO RESCHEDULE [facility] APPT. SHE WANTS TO COME ON THE SAME DAY AS HER , 1/31/23. HIS APPT IS AT 1115. PLEASE ADVISE.

## 2022-09-07 ENCOUNTER — TRANSCRIBE ORDERS (OUTPATIENT)
Dept: ADMINISTRATIVE | Facility: HOSPITAL | Age: 72
End: 2022-09-07

## 2022-09-07 DIAGNOSIS — Z12.31 VISIT FOR SCREENING MAMMOGRAM: Primary | ICD-10-CM

## 2022-10-19 ENCOUNTER — LAB (OUTPATIENT)
Dept: FAMILY MEDICINE CLINIC | Facility: CLINIC | Age: 72
End: 2022-10-19

## 2022-10-19 DIAGNOSIS — Z00.00 PREVENTATIVE HEALTH CARE: ICD-10-CM

## 2022-10-19 DIAGNOSIS — E78.2 MIXED HYPERLIPIDEMIA: Chronic | ICD-10-CM

## 2022-10-19 DIAGNOSIS — I10 ESSENTIAL HYPERTENSION: Primary | Chronic | ICD-10-CM

## 2022-10-20 LAB
ALBUMIN SERPL-MCNC: 4.5 G/DL (ref 3.5–5.2)
ALBUMIN/GLOB SERPL: 1.9 G/DL
ALP SERPL-CCNC: 64 U/L (ref 39–117)
ALT SERPL-CCNC: 14 U/L (ref 1–33)
AST SERPL-CCNC: 17 U/L (ref 1–32)
BASOPHILS # BLD AUTO: 0.07 10*3/MM3 (ref 0–0.2)
BASOPHILS NFR BLD AUTO: 0.9 % (ref 0–1.5)
BILIRUB SERPL-MCNC: 0.3 MG/DL (ref 0–1.2)
BUN SERPL-MCNC: 17 MG/DL (ref 8–23)
BUN/CREAT SERPL: 17.7 (ref 7–25)
CALCIUM SERPL-MCNC: 10.1 MG/DL (ref 8.6–10.5)
CHLORIDE SERPL-SCNC: 103 MMOL/L (ref 98–107)
CHOLEST SERPL-MCNC: 272 MG/DL (ref 0–200)
CO2 SERPL-SCNC: 26 MMOL/L (ref 22–29)
CREAT SERPL-MCNC: 0.96 MG/DL (ref 0.57–1)
EGFRCR SERPLBLD CKD-EPI 2021: 63.8 ML/MIN/1.73
EOSINOPHIL # BLD AUTO: 0.13 10*3/MM3 (ref 0–0.4)
EOSINOPHIL NFR BLD AUTO: 1.7 % (ref 0.3–6.2)
ERYTHROCYTE [DISTWIDTH] IN BLOOD BY AUTOMATED COUNT: 13.4 % (ref 12.3–15.4)
GLOBULIN SER CALC-MCNC: 2.4 GM/DL
GLUCOSE SERPL-MCNC: 102 MG/DL (ref 65–99)
HCT VFR BLD AUTO: 41.2 % (ref 34–46.6)
HDLC SERPL-MCNC: 50 MG/DL (ref 40–60)
HGB BLD-MCNC: 13.1 G/DL (ref 12–15.9)
IMM GRANULOCYTES # BLD AUTO: 0.04 10*3/MM3 (ref 0–0.05)
IMM GRANULOCYTES NFR BLD AUTO: 0.5 % (ref 0–0.5)
LDLC SERPL CALC-MCNC: 203 MG/DL (ref 0–100)
LYMPHOCYTES # BLD AUTO: 1.9 10*3/MM3 (ref 0.7–3.1)
LYMPHOCYTES NFR BLD AUTO: 25.3 % (ref 19.6–45.3)
MCH RBC QN AUTO: 27.8 PG (ref 26.6–33)
MCHC RBC AUTO-ENTMCNC: 31.8 G/DL (ref 31.5–35.7)
MCV RBC AUTO: 87.3 FL (ref 79–97)
MONOCYTES # BLD AUTO: 0.54 10*3/MM3 (ref 0.1–0.9)
MONOCYTES NFR BLD AUTO: 7.2 % (ref 5–12)
NEUTROPHILS # BLD AUTO: 4.82 10*3/MM3 (ref 1.7–7)
NEUTROPHILS NFR BLD AUTO: 64.4 % (ref 42.7–76)
NRBC BLD AUTO-RTO: 0 /100 WBC (ref 0–0.2)
PLATELET # BLD AUTO: 333 10*3/MM3 (ref 140–450)
POTASSIUM SERPL-SCNC: 4.9 MMOL/L (ref 3.5–5.2)
PROT SERPL-MCNC: 6.9 G/DL (ref 6–8.5)
RBC # BLD AUTO: 4.72 10*6/MM3 (ref 3.77–5.28)
SODIUM SERPL-SCNC: 140 MMOL/L (ref 136–145)
TRIGL SERPL-MCNC: 108 MG/DL (ref 0–150)
VLDLC SERPL CALC-MCNC: 19 MG/DL (ref 5–40)
WBC # BLD AUTO: 7.5 10*3/MM3 (ref 3.4–10.8)

## 2022-10-26 ENCOUNTER — HOSPITAL ENCOUNTER (OUTPATIENT)
Dept: MAMMOGRAPHY | Facility: HOSPITAL | Age: 72
Discharge: HOME | End: 2022-10-26
Admitting: FAMILY MEDICINE

## 2022-10-26 ENCOUNTER — OFFICE VISIT (OUTPATIENT)
Dept: FAMILY MEDICINE CLINIC | Facility: CLINIC | Age: 72
End: 2022-10-26

## 2022-10-26 VITALS
HEIGHT: 62 IN | SYSTOLIC BLOOD PRESSURE: 118 MMHG | DIASTOLIC BLOOD PRESSURE: 72 MMHG | BODY MASS INDEX: 33.31 KG/M2 | WEIGHT: 181 LBS | OXYGEN SATURATION: 96 % | HEART RATE: 82 BPM | TEMPERATURE: 96.8 F

## 2022-10-26 DIAGNOSIS — I10 ESSENTIAL HYPERTENSION: ICD-10-CM

## 2022-10-26 DIAGNOSIS — M54.31 RIGHT SIDED SCIATICA: ICD-10-CM

## 2022-10-26 DIAGNOSIS — Z12.31 VISIT FOR SCREENING MAMMOGRAM: ICD-10-CM

## 2022-10-26 DIAGNOSIS — E78.2 MIXED HYPERLIPIDEMIA: ICD-10-CM

## 2022-10-26 DIAGNOSIS — Z00.00 MEDICARE ANNUAL WELLNESS VISIT, SUBSEQUENT: Primary | ICD-10-CM

## 2022-10-26 PROCEDURE — 77063 BREAST TOMOSYNTHESIS BI: CPT

## 2022-10-26 PROCEDURE — 1160F RVW MEDS BY RX/DR IN RCRD: CPT | Performed by: FAMILY MEDICINE

## 2022-10-26 PROCEDURE — G0439 PPPS, SUBSEQ VISIT: HCPCS | Performed by: FAMILY MEDICINE

## 2022-10-26 PROCEDURE — 1170F FXNL STATUS ASSESSED: CPT | Performed by: FAMILY MEDICINE

## 2022-10-26 PROCEDURE — 77067 SCR MAMMO BI INCL CAD: CPT

## 2022-10-26 RX ORDER — PRAVASTATIN SODIUM 10 MG
10 TABLET ORAL DAILY
Qty: 90 TABLET | Refills: 0 | Status: SHIPPED | OUTPATIENT
Start: 2022-10-26 | End: 2023-01-17

## 2022-10-26 RX ORDER — LISINOPRIL 10 MG/1
10 TABLET ORAL DAILY
Qty: 90 TABLET | Refills: 1 | Status: SHIPPED | OUTPATIENT
Start: 2022-10-26

## 2022-10-26 NOTE — PROGRESS NOTES
The ABCs of the Annual Wellness Visit  Subsequent Medicare Wellness Visit    Chief Complaint   Patient presents with   • Medicare Wellness-subsequent      Subjective    History of Present Illness:  Joan Block is a 72 y.o. female who presents for a Subsequent Medicare Wellness Visit.    The following portions of the patient's history were reviewed and   updated as appropriate: past social history, past surgical history and problem list.    Compared to one year ago, the patient feels her physical   health is the same.    Compared to one year ago, the patient feels her mental   health is the same.     Has medical history of hypertension, hyperlipidemia, osteopenia     Hypertension: on 10mg lisinopril, even on lower end but no orthostatic symptoms and feels well     R sided sharp buttocks pain-[facility]ending [facility] R sciatica    Due for repeat DEXA, on vitamin D daily, will have new provider schedule    Recent Hospitalizations:  She was not admitted to the hospital during the last year.       Current Medical Providers:  Patient Care Team:  Qiana Yee MD as PCP - General (Family Medicine)  Norman Brown MD as Surgeon (Orthopedic Surgery)  Kassi Rios APRN (Dermatology)    Outpatient Medications Prior to Visit   Medication Sig Dispense Refill   • lisinopril (PRINIVIL,ZESTRIL) 10 MG tablet Take 1 tablet by mouth Daily. 90 tablet 1   • cyclobenzaprine (FLEXERIL) 5 MG tablet Take 1 tablet by mouth 2 (Two) Times a Day As Needed for Muscle Spasms. 60 tablet 2   • docusate sodium (Colace) 100 MG capsule Take 1 capsule by mouth 2 (Two) Times a Day. 30 capsule 0   • methylPREDNISolone (MEDROL) 4 MG dose pack Use as directed by package instructions 21 tablet 0   • ondansetron (Zofran) 4 MG tablet Take 1 tablet by mouth Every 8 (Eight) Hours As Needed for Nausea or Vomiting. 15 tablet 3   • pregabalin (Lyrica) 25 MG capsule Take 1 capsule by mouth 2 (Two) Times a Day. 60 capsule 0  "    No facility-administered medications prior to visit.       No opioid medication identified on active medication list. I have reviewed chart for other potential  high risk medication/s and harmful drug interactions in the elderly.          Aspirin is not on active medication list.       Patient Active Problem List   Diagnosis   • Distal radial fracture   • Closed fracture of left distal radius   • Fracture, Col[place], left, closed   • Essential hypertension   • Mixed hyperlipidemia     Advance Care Planning  Advance Directive is not on file.    Review of Systems   Constitutional: Negative for fever.   HENT: Negative for congestion.    Respiratory: Negative for shortness of breath.    Cardiovascular: Negative for chest pain.   Gastrointestinal: Negative for abdominal pain.   Neurological: Negative for weakness.        Objective    Vitals:    10/26/22 1414   BP: 118/72   Pulse: 82   Temp: 96.8 °F (36 °C)   SpO2: 96%   Weight: 82.1 kg (181 lb)   Height: 157.5 cm (62.01\")     Estimated body mass index is 33.1 kg/m² as calculated from the following:    Height as of this encounter: 157.5 cm (62.01\").    Weight as of this encounter: 82.1 kg (181 lb).    BMI is >= 30 and <35. (Class 1 Obesity). The following options were offered after discussion;: weight loss educational material (shared in after visit summary)      Does the patient have evidence of cognitive impairment? No    Physical Exam  Vitals reviewed.   Cardiovascular:      Rate and Rhythm: Normal rate.      Pulses: Normal pulses.   Pulmonary:      Effort: Pulmonary effort is normal.   Abdominal:      General: Abdomen is flat.   Neurological:      General: No focal deficit present.      Mental Status: She is alert.   Psychiatric:         Mood and Affect: Mood normal.       Lab Results   Component Value Date    CHLPL 272 (H) 10/19/2022    TRIG 108 10/19/2022    HDL 50 10/19/2022     (H) 10/19/2022    VLDL 19 10/19/2022            HEALTH RISK " ASSESSMENT    Smoking Status:  Social History     Tobacco Use   Smoking Status Former   • Packs/day: 0.50   • Years: 45.00   • Pack years: 22.50   • Types: Cigarettes   • Start date: 1970   • Quit date: 3/2/2015   • Years since quittin.6   Smokeless Tobacco Never     Alcohol Consumption:  Social History     Substance and Sexual Activity   Alcohol Use No     Fall Risk Screen:    CHOLO Fall Risk Assessment was completed, and patient is at LOW risk for falls.Assessment completed on:10/26/2022    Depression Screening:  PHQ-2/PHQ-9 Depression Screening 10/26/2022   Retired PHQ-9 Total Score -   Retired Total Score -   Little Interest or Pleasure in Doing Things 0-->not at all   Feeling Down, Depressed or Hopeless 0-->not at all   PHQ-9: Brief Depression Severity Measure Score 0       Health Habits and Functional and Cognitive Screening:  Functional & Cognitive Status 10/26/2022   Do you have difficulty preparing food and eating? No   Do you have difficulty bathing yourself, getting dressed or grooming yourself? No   Do you have difficulty using the toilet? No   Do you have difficulty moving around from place to place? No   Do you have trouble with steps or getting out of a bed or a chair? No   Current Diet Well Balanced Diet   Dental Exam Up to date   Eye Exam Up to date   Exercise (times per week) 0 times per week   Current Exercises Include No Regular Exercise   Do you need help using the phone?  No   Are you deaf or do you have serious difficulty hearing?  No   Do you need help with transportation? No   Do you need help shopping? No   Do you need help preparing meals?  No   Do you need help with housework?  No   Do you need help with laundry? No   Do you need help taking your medications? No   Do you need help managing money? No   Do you ever drive or ride in a car without wearing a seat belt? No   Have you felt unusual stress, anger or loneliness in the last month? -   Who do you live with? -   If you need  help, do you have trouble finding someone available to you? -   Have you been bothered in the last four weeks by sexual problems? -   Do you have difficulty concentrating, remembering or making decisions? -       Age-appropriate Screening Schedule:  Refer to the list below for future screening recommendations based on patient's age, sex and/or medical conditions. Orders for these recommended tests are listed in the plan section. The patient has been provided with a written plan.    Health Maintenance   Topic Date Due   • DXA SCAN  Never done   • TDAP/TD VACCINES (1 - Tdap) Never done   • ZOSTER VACCINE (1 of 2) Never done   • INFLUENZA VACCINE  08/01/2022   • MAMMOGRAM  10/19/2023   • LIPID PANEL  10/19/2023              Assessment & Plan   CMS Preventative Services Quick Reference  Risk Factors Identified During Encounter  Cardiovascular Disease  Dementia/Memory   Fall Risk-High or Moderate  Glaucoma or Family History of Glaucoma  Inactivity/Sedentary  Obesity/Overweight   Polypharmacy  The above risks/problems have been discussed with the patient.  Follow up actions/plans if indicated are seen below in the Assessment/Plan Section.  Pertinent information has been shared with the patient in the After Visit Summary.    Diagnoses and all orders for this visit:    1. Medicare annual wellness visit, subsequent (Primary)    2. Mixed hyperlipidemia  -     pravastatin (PRAVACHOL) 10 MG tablet; Take 1 tablet by mouth Daily.  Dispense: 90 tablet; Refill: 0    3. Essential hypertension  -     lisinopril (PRINIVIL,ZESTRIL) 10 MG tablet; Take 1 tablet by mouth Daily.  Dispense: 90 tablet; Refill: 1    4. Right sided sciatica  -     Ambulatory Referral to Physical Therapy Evaluate and treat; Stretching, Strengthening, ROM      -DEXA bone density scan. New provider can order  -Pravastatin: can trial, had SE with other statins   -[facility]      Follow Up:     An After Visit Summary and PPPS were made available to  the patient.

## 2022-11-15 ENCOUNTER — HOSPITAL ENCOUNTER (OUTPATIENT)
Dept: PHYSICAL THERAPY | Facility: HOSPITAL | Age: 72
Setting detail: THERAPIES SERIES
Discharge: HOME | End: 2022-11-15

## 2022-11-15 DIAGNOSIS — M54.31 RIGHT SIDED SCIATICA: Primary | ICD-10-CM

## 2022-11-15 PROCEDURE — 97161 PT EVAL LOW COMPLEX 20 MIN: CPT

## 2022-11-15 PROCEDURE — 97110 THERAPEUTIC EXERCISES: CPT

## 2022-11-15 NOTE — THERAPY EVALUATION
Outpatient Physical Therapy Ortho Initial Evaluation   [place]     Patient Name: Joan Block  : 1950  MRN: [id number]  Today's Date: 11/15/2022      Visit Date: 11/15/2022    Patient Active Problem List   Diagnosis   • Distal radial fracture   • Closed fracture of left distal radius   • Fracture, Col[place], left, closed   • Essential hypertension   • Mixed hyperlipidemia        Past Medical History:   Diagnosis Date   • Arthritis of back 8 yrs ago   • Fracture of wrist 2021   • Fracture, radius 2021   • Fracture, ulna 2021   • Hyperlipidemia    • Hypertension    • Neuromuscular disorder (HCC)    • Osteopenia 2020   • Tendinitis of knee         Past Surgical History:   Procedure Laterality Date   • APPENDECTOMY     • FOOT SURGERY     • FRACTURE SURGERY     • KNEE SURGERY     • ORIF ULNA/RADIUS FRACTURES Left 6/15/2021    Procedure: ULNA/RADIUS OPEN REDUCTION INTERNAL FIXATION;  Surgeon: Norman Brown MD;  Location: [facility];  Service: Orthopedics;  Laterality: Left;  ULNA/RADIAL OPEN REDUCTION INTERNAL FIXATION   • TONSILLECTOMY     • WRIST SURGERY         Visit Dx:     ICD-10-CM ICD-9-CM   1. Right sided sciatica  M54.31 724.3          Patient History     Row Name 11/15/22 1000             History    Chief Complaint Difficulty Walking;Difficulty with daily activities;Joint stiffness;Muscle tenderness;Muscle weakness;Pain;Tightness  -LN      Type of Pain Back pain;Lower Extremity / Leg  R thigh  -LN      Date Current Problem(s) Began --  increased sx x 1 month  -LN      Brief Description of Current Complaint Pt reports that she has had LBP for awhile; had a bad episode from  to August and got much better but then started again 1 month ago. Pt reports her walking tolerance has decreased significantly. Pain radiates into R buttocks and into R thigh, posterior > anterior.  No N/T. Sees Chiropractor; has adjustments; last visit was yesterday- usually 1 x  "month. No testing done yet. Pt dx with right sided sciatica.  -LN      Previous treatment for THIS PROBLEM Chiropractor;Medication  Motrin as needed  -LN      Patient/Caregiver Goals Relieve pain;Improve mobility;Improve strength;Know what to do to help the symptoms  -LN      Patient/Caregiver Goals Comment \"strengthening right leg; walking straight\"  -LN      Occupation/sports/leisure activities Works part-time at [employer] in [place]; likes to do reanna painting  -LN      Patient seeing anyone else for problem(s)? PCP  -LN      How has patient tried to help current problem? Motrin PRN; HP on back; rest; magnesium lotion daily on right leg  -LN      Results of Clinical Tests no tests done yet  -LN      Related/Recent Hospitalizations No  -LN      Surgery/Hospitalization n/a  -LN      History of Previous Related Injuries none reported  -LN         Pain     Pain Location Back;Leg  R buttocks and thigh  -LN      Pain at Present 2  -LN      Pain at Best 0  -LN      Pain at Worst 7;8  -LN      Pain Frequency Intermittent  constant today since she woke up this am  -LN      Pain Description Dull;Aching;Throbbing;Spasm;Cramping  cramping occasionally at night in right thigh  -LN      What Performance Factors Make the Current Problem(s) WORSE? walking  -LN      What Performance Factors Make the Current Problem(s) BETTER? rest  -LN      Tolerance Time- Standing good tolerance  -LN      Tolerance Time- Sitting 1 hour limit  -LN      Tolerance Time- Walking 10 min limit; finds herself limping at end of the day  -LN      Tolerance Time- Lying does report some disturbed sleep due to pain and mm cramps in right leg.  -LN      Is your sleep disturbed? Yes  -LN      What position do you sleep in? Right sidelying;Left sidelying  -LN      Difficulties at work? can do all job duties; \"I have a stool that I can prop my leg on.\"  -LN      Difficulties with ADL's? has trouble bringing right foot onto left knee to don and doff " sock and shoe  -LN      Difficulties with recreational activities? none stated  -LN         Fall Risk Assessment    Any falls in the past year: Yes  -LN      Number of falls reported in the last 12 months 1  caused a wrist fx and hit right hip  -LN      Factors that contributed to the fall: Lost balance  -LN         Services    Prior Rehab/Home Health Experiences Yes  -LN      When was the prior experience with Rehab/Home Health OT in 2022 for right wrist fx  -LN      Where was the prior experience with Rehab/Home Health [facility]  -LN      Are you currently receiving Home Health services No  -LN      Do you plan to receive Home Health services in the near future No  -LN         Daily Activities    Primary Language English  -LN      Are you able to read Yes  -LN      Are you able to write Yes  -LN      How does patient learn best? Reading;Pictures/Video  -LN      Teaching needs identified Home Exercise Program;Management of Condition;Other (comment)  Risks & benefits of treatment explained to pt.  -LN      Patient is concerned about/has problems with Bed Mobility;Difficulty with self care (i.e. bathing, dressing, toileting:;Flexibility;Grasping objects lifting;Performing home management (household chores, shopping, care of dependents);Sitting;Transfers (getting out of a chair, bed);Walking  -LN      Does patient have problems with the following? None  -LN      Barriers to learning None  -LN      Pt Participated in POC and Goals Yes  -LN         Safety    Are you being hurt, hit, or frightened by anyone at home or in your life? No  -LN      Are you being neglected by a caregiver No  -LN      Have you had any of the following issues with N/A  -LN            User Key  (r) = Recorded By, (t) = Taken By, (c) = Cosigned By    Initials Name Provider Type    Mary Gutierrez, PT Physical Therapist                 PT Ortho     Row Name 11/15/22 1000       Subjective Comments    Subjective Comments Pt  "c/o pain across LB; R>L and pain that radiates into R buttocks and R thigh; pain comes and goes, but has been constant today since she woke up. No c/o any N/T. \"I want to learn some exercises that I can do at home.\" She reports right leg feels weaker than right.  -LN       Precautions and Contraindications    Precautions/Limitations no known precautions/limitations  -LN       Subjective Pain    Able to rate subjective pain? yes  -LN    Pre-Treatment Pain Level 2  -LN       Posture/Observations    Forward Head Moderate  -LN    Thoracic Kyphosis Moderate;Standing posture  -LN    Rounded Shoulders Bilateral:;Moderate;Standing posture  -LN    Lumbar lordosis Mild;Increased;Standing posture  -LN    Iliac crests Left:;Mild;Moderate;Elevated;Standing posture  -LN       Lumbar/SI Special Tests    Standing Flexion Test (SI Dysfunction) Right:;Positive  -LN    SLR (Neural Tension) Right:;Positive;Left:;Negative  -LN    FAIR Test (Piriformis Syndrome) Right:;Positive;Left:;Negative  -LN       Lumbosacral Palpation    SI Bilateral:;Tender  -LN    Piriformis Right:;Tender;Guarded/taut  -LN    Erector Spinae (Paraspinals) Bilateral:;Tender;Guarded/taut  -LN    Hamstring Right:;Tender;Guarded/taut  -LN    Lumbosacral Palpation Comments tenderness R anterior thigh as well.  -LN       Hip Special Tests    Pastor test (tightness of ITB) Right:;Positive  for hip flexion tightness  -LN    Gregor’s test (tightness of ITB) Right:;Positive  -LN    FAIR test (piriformis syndrome) Right:;Positive;Left:;Negative  -LN       Leg Length Test    True Equal  -LN    Apparent Unequal;Right Higher Leg  sx of R pelvic obliquity  -LN       Head/Neck/Trunk    Trunk Extension AROM WFL- painfree  -LN    Trunk Flexion AROM 50%- R HS pain/tightness  -LN    Trunk Lt Lateral Flexion AROM WFL-painfree  -LN    Trunk Rt Lateral Flexion AROM WFL- painfree  -LN    Trunk Lt Rotation AROM WFL-painfree  -LN    Trunk Rt Rotation AROM WFL- painfree  -LN       MMT " Neck/Trunk    Trunk Flexion MMT, Gross Movement (3/5) fair  -LN    Trunk Extension MMT, Gross Movement (3-/5) fair minus  grossly  -LN       MMT Right Lower Ext    Rt Hip Flexion MMT, Gross Movement (3-/5) fair minus  LBP  -LN    Rt Hip Extension MMT, Gross Movement (4/5) good  -LN    Rt Hip ABduction MMT, Gross Movement (3+/5) fair plus  -LN    Rt Hip ADduction MMT, Gross Movement (3+/5) fair plus  -LN    Rt Knee Extension MMT, Gross Movement (4-/5) good minus  -LN    Rt Knee Flexion MMT, Gross Movement (4/5) good  HS pain  -LN    Rt Ankle Plantarflexion MMT, Gross Movement (4+/5) good plus  -LN    Rt Ankle Dorsiflexion MMT, Gross Movement (4/5) good  -LN       MMT Left Lower Ext    Lt Hip Flexion MMT, Gross Movement (4/5) good  -LN    Lt Hip Extension MMT, Gross Movement (4+/5) good plus  -LN    Lt Hip ABduction MMT, Gross Movement (4+/5) good plus  -LN    Lt Hip ADduction MMT, Gross Movement (4+/5) good plus  -LN    Lt Knee Extension MMT, Gross Movement (4/5) good  -LN    Lt Knee Flexion MMT, Gross Movement (4+/5) good plus  -LN    Lt Ankle Plantarflexion MMT, Gross Movement (5/5) normal  -LN    Lt Ankle Dorsiflexion MMT, Gross Movement (5/5) normal  -LN       Sensation    Sensation WNL? WNL  -LN    Light Touch No apparent deficits  -LN    Additional Comments no c/o any N/T in legs  -LN       Lower Extremity Flexibility    Hamstrings Right:;Moderately limited;Left:;Mildly limited  -LN    Hip Flexors Right:;Moderately limited  -LN    ITB Right:;Moderately limited;Left:;Mildly limited  -LN    Hip Adductors Right:;Mildly limited;Moderately limited  -LN    Hip External Rotators Right:;Mildly limited;Moderately limited  -LN    Gastrocnemius Right:;Mildly limited;Moderately limited  -LN    LE Other Flexibility Right:;Mildly limited;Moderately limited  piriformis muscle  -LN       Transfers    Bed-Chair [place] (Transfers) independent  -LN    Chair-Bed [place] (Transfers) independent  -LN    Sit-Stand  "[place] (Transfers) independent  -LN    Stand-Sit [place] (Transfers) independent  -LN    Transfers, Sit-Stand-Sit, Assist Device other (see comments)  none used  -LN       Gait/Stairs (Locomotion)    [place] Level (Gait) independent  -LN    Assistive Device (Gait) other (see comments)  none used  -LN    Deviations/Abnormal Patterns (Gait) right sided deviations;antalgic;gait speed decreased;stride length decreased  -LN    Right Sided Gait Deviations forward flexed posture  -LN    Comment, (Gait/Stairs) Pt ambulated with minimal antalgic gait on R today; she reports her limp worsens at night.  -LN          User Key  (r) = Recorded By, (t) = Taken By, (c) = Cosigned By    Initials Name Provider Type    Mary Gutierrez, PT Physical Therapist                            Therapy Education  Education Details: Pt to work on HEP 1-2 x day and to use MH/CP/home TENS unit PRN.  Given: HEP, Symptoms/condition management, Pain management, Posture/body mechanics  Program: New  How Provided: Verbal, Demonstration, Written  Provided to: Patient  Level of Understanding: Teach back education performed, Verbalized, Demonstrated      PT OP Goals     Row Name 11/15/22 1000          PT Short Term Goals    STG Date to Achieve 11/29/22  -LN     STG 1 Pt to verbally report decreased LBP to <5/10 \"at its worst\" with ADLs and everyday activities.  -LN     STG 2 Pt to report decreased radiating right thigh pain by 25%-50%.  -LN     STG 3 Pt independent with initial HEP issued by therapist.  -LN     STG 4 Trunk flexion improved by 25% to allow her don and doff shoes and socks easier.  -LN     STG 5 Right LE strength improved by 1/2 muscle grade.  -LN     STG 6 Patient able to walk for 20-30 minutes with her dog without any c/o increased LBP or right leg pain.  -LN        Long Term Goals    LTG Date to Achieve 12/13/22  -LN     LTG 1 Pt to verbally report decreased LBP to 1-2/10 \"at its worst\" with ADLs and everyday " activities.  -LN     LTG 2 Pt to report decreased radiating right thigh pain by 50%-75%.  -LN     LTG 3 Pt independent with more advanced HEP issued by therapist.  -LN     LTG 4 Trunk ROM WFL-WNL and painfree all planes.  -LN     LTG 5 B LE strength improved to 4+/5-5/5.  -LN     LTG 6 Pt to report decreased episodes of right leg cramping at night by at least 50%.  -LN     LTG 7 Patient able to walk for 1 mile with her dog without any c/o increased LBP or right leg pain.  -LN     LTG 8 Improved right HS and hip flexor flexibility noted when tested.  -LN     LTG 9 Pt able to bring right foot onto left knee without any difficulty to don and doff her shoe and sock.  -LN        Time Calculation    PT Goal Re-Cert Due Date 12/13/22  -LN           User Key  (r) = Recorded By, (t) = Taken By, (c) = Cosigned By    Initials Name Provider Type    Mary Gutierrez, PT Physical Therapist                 PT Assessment/Plan     Row Name 11/15/22 1000          PT Assessment    Functional Limitations Impaired gait;Impaired locomotion;Limitation in home management;Limitations in community activities;Limitations in functional capacity and performance;Performance in self-care ADL  -LN     Impairments Gait;Impaired flexibility;Locomotion;Muscle strength;Pain;Posture;Range of motion  -LN     Assessment Comments Pt presents with hx of LBP with recent flare up x 1 month without any injury. Pt presents with c/o B LBP with radiating pain into R buttocks and into right thigh, meron posterior. Pt with decreased trunk flexion, decreased strength in core and R>L LE; sx of R pelvic obliquity; decreased flexibility R>L leg; decreased sitting and meron walking tolerance and disturbed sleep. Pt with sx of lumbar DDD with right sided sciatica.  -LN     Please refer to paper survey for additional self-reported information Yes  -LN     Rehab Potential Good  -LN     Patient/caregiver participated in establishment of treatment plan and goals Yes   "-LN     Patient would benefit from skilled therapy intervention Yes  -LN        PT Plan    PT Frequency 1x/week;2x/week  -LN     Predicted Duration of Therapy Intervention (PT) 4 -6 weeks  -LN     Planned CPT's? PT EVAL LOW COMPLEXITY: 29768;PT THER PROC EA 15 MIN: 53007;PT MANUAL THERAPY EA 15 MIN: 84844;PT HOT OR COLD PACK TREAT MCARE;PT ELECTRICAL STIM UNATTEND: ;PT ULTRASOUND EA 15 MIN: 63910;PT TRACTION LUMBAR: 70160  -LN     Physical Therapy Interventions (Optional Details) home exercise program;lumbar stabilization;manual therapy techniques;modalities;patient/family education;postural re-education;ROM (Range of Motion);strengthening;stretching;taping  -LN     PT Plan Comments See pt 1-2 x week for therapeutic exercises/core stabilization with HEP; modalities PRN (IFC/MH/CP/US); MET PRN; pt and posture education; kinesiotape PRN. Consider trial of lumbar traction if her leg sx persist.  -LN           User Key  (r) = Recorded By, (t) = Taken By, (c) = Cosigned By    Initials Name Provider Type    Mary Gutierrez, PT Physical Therapist                 Modalities     Row Name 11/15/22 1000             Moist Heat    Patient denies application of MH Yes  to use at home PRN as well as home TENS unit  -LN            User Key  (r) = Recorded By, (t) = Taken By, (c) = Cosigned By    Initials Name Provider Type    Mary Gutierrez, PT Physical Therapist               OP Exercises     Row Name 11/15/22 1000             Precautions    Existing Precautions/Restrictions no known precautions/restrictions  -LN         Subjective Comments    Subjective Comments Pt c/o pain across LB; R>L and pain that radiates into R buttocks and R thigh; pain comes and goes, but has been constant today since she woke up. No c/o any N/T. \"I want to learn some exercises that I can do at home.\" She reports right leg feels weaker than right.  -LN         Subjective Pain    Able to rate subjective pain? yes  -LN      " Pre-Treatment Pain Level 2  -LN         Total Minutes    05973 - PT Therapeutic Exercise Minutes 15  advised pt to only do exercises within a painfree range  -LN      85949 - PT Manual Therapy Minutes 5  -LN         Exercise 1    Exercise Name 1 LTR  -LN      Cueing 1 Verbal;Tactile;Demo  -LN      Reps 1 5 each  -LN      Time 1 5 sec  -LN         Exercise 2    Exercise Name 2 SKC  -LN      Cueing 2 Verbal;Tactile;Demo  -LN      Reps 2 5 each  -LN      Time 2 10 sec  -LN         Exercise 3    Exercise Name 3 supine HS stretch with sheet  -LN      Cueing 3 Verbal;Tactile;Demo  -LN      Reps 3 5 each  -LN      Time 3 10 sec  -LN         Exercise 4    Exercise Name 4 PPT  -LN      Cueing 4 Verbal;Tactile;Demo  -LN      Reps 4 10  -LN      Time 4 5 sec  -LN         Exercise 5    Exercise Name 5 PPT with hooklying ball squeeze  -LN      Cueing 5 Verbal;Tactile;Demo  -LN      Reps 5 10  -LN      Time 5 5 sec  -LN         Exercise 6    Exercise Name 6 PPT with supine clam shell vs TB  -LN      Cueing 6 Verbal;Tactile;Demo  -LN      Reps 6 10  -LN      Time 6 5 sec  -LN      Additional Comments Green TB  -LN            User Key  (r) = Recorded By, (t) = Taken By, (c) = Cosigned By    Initials Name Provider Type    Mary Gutierrez, PT Physical Therapist              Manual Rx (last 36 hours)     Manual Treatments     Row Name 11/15/22 1000             Total Minutes    45462 - PT Manual Therapy Minutes 5  -LN         Manual Rx 1    Manual Rx 1 Location Pelvis  -LN      Manual Rx 1 Type MET: shotgun/R anterior innominate/R pelvic inflare  -LN      Manual Rx 1 Duration Improved pelvic alignment noted after MET.  -LN            User Key  (r) = Recorded By, (t) = Taken By, (c) = Cosigned By    Initials Name Provider Type    Mary Gutierrez, PT Physical Therapist                            Outcome Measure Options: Other Outcome Measure  Other Outcome Measure Tool Used  Other Outcome Measure Tool Comments: Back  index- score of 20 today.      Time Calculation:     Start Time: 1005  Stop Time: 1055  Time Calculation (min): 50 min  Timed Charges  76109 - PT Therapeutic Exercise Minutes: 15 (advised pt to only do exercises within a painfree range)  73449 - PT Manual Therapy Minutes: 5  Total Minutes  Timed Charges Total Minutes: 20   Total Minutes: 20     Therapy Charges for Today     Code Description Service Date Service Provider Modifiers Qty    07823998189 HC PT THER PROC EA 15 MIN 11/15/2022 Mary Cummings, PT GP 1    06663025475 HC PT EVAL LOW COMPLEXITY 3 11/15/2022 Mary Cummings, PT GP 1          PT G-Codes  Outcome Measure Options: Other Outcome Measure         Mary Cummings, PT  11/15/2022       No

## 2022-11-22 ENCOUNTER — HOSPITAL ENCOUNTER (OUTPATIENT)
Dept: PHYSICAL THERAPY | Facility: HOSPITAL | Age: 72
Setting detail: THERAPIES SERIES
Discharge: HOME | End: 2022-11-22

## 2022-11-22 DIAGNOSIS — M54.31 RIGHT SIDED SCIATICA: Primary | ICD-10-CM

## 2022-11-22 PROCEDURE — 97110 THERAPEUTIC EXERCISES: CPT

## 2022-11-22 NOTE — THERAPY TREATMENT NOTE
"    Outpatient Physical Therapy Ortho Treatment Note   [place]     Patient Name: Joan Block  : 1950  MRN: [id number]  Today's Date: 2022      Visit Date: 2022    Visit Dx:    ICD-10-CM ICD-9-CM   1. Right sided sciatica  M54.31 724.3       Patient Active Problem List   Diagnosis   • Distal radial fracture   • Closed fracture of left distal radius   • Fracture, Col[place], left, closed   • Essential hypertension   • Mixed hyperlipidemia        Past Medical History:   Diagnosis Date   • Arthritis of back 8 yrs ago   • Fracture of wrist 2021   • Fracture, radius 2021   • Fracture, ulna 2021   • Hyperlipidemia    • Hypertension    • Neuromuscular disorder (HCC)    • Osteopenia 2020   • Tendinitis of knee         Past Surgical History:   Procedure Laterality Date   • APPENDECTOMY     • FOOT SURGERY     • FRACTURE SURGERY     • KNEE SURGERY     • ORIF ULNA/RADIUS FRACTURES Left 6/15/2021    Procedure: ULNA/RADIUS OPEN REDUCTION INTERNAL FIXATION;  Surgeon: Norman Brown MD;  Location: [facility];  Service: Orthopedics;  Laterality: Left;  ULNA/RADIAL OPEN REDUCTION INTERNAL FIXATION   • TONSILLECTOMY     • WRIST SURGERY          PT Ortho     Row Name 22 1300       Subjective Comments    Subjective Comments pt reports she is doing \"so much better!\"  Pt reporting minimal to no LBP this afternoon as well no radicular symptoms posterior (R) thigh; still has trouble placing (R) foot on (L) knee to maryam shoes and socks  -       Subjective Pain    Able to rate subjective pain? yes  -    Pre-Treatment Pain Level 0  -          User Key  (r) = Recorded By, (t) = Taken By, (c) = Cosigned By    Initials Name Provider Type     Adrian Holbrook, KARINA Physical Therapist Assistant                             PT Assessment/Plan     Row Name 22 5919          PT Assessment    Assessment Comments pt with improved pelvic alignment requiring minimal MET; pt " "reporting decreased overall symptoms with minimal to no LBP or radicular symptoms; continues to have difficulty with donning shoes/socks, noting tightness (R) hip - tolerated addition of new stretches well  -        PT Plan    PT Plan Comments Cont per POC; check response to new stretches  -           User Key  (r) = Recorded By, (t) = Taken By, (c) = Cosigned By    Initials Name Provider Type     Adrian Holbrook, PTA Physical Therapist Assistant                   OP Exercises     Row Name 11/22/22 1513 11/22/22 1300          Precautions    Existing Precautions/Restrictions -- no known precautions/restrictions  -        Subjective Comments    Subjective Comments -- pt reports she is doing \"so much better!\"  Pt reporting minimal to no LBP this afternoon as well no radicular symptoms posterior (R) thigh; still has trouble placing (R) foot on (L) knee to maryam shoes and socks  -        Subjective Pain    Able to rate subjective pain? -- yes  -     Pre-Treatment Pain Level -- 0  -        Total Minutes    19859 - PT Therapeutic Exercise Minutes 30  -MH --        Exercise 1    Exercise Name 1 -- (B) supine hamstring stretch  -     Cueing 1 -- Verbal;Tactile;Demo  -     Reps 1 -- 5  -MH     Time 1 -- 10 sec  -        Exercise 2    Exercise Name 2 -- (R) supine hamstring stretch w/Add  -     Cueing 2 -- Verbal;Tactile;Demo  -     Reps 2 -- 5  -MH     Time 2 -- 10 sec  -        Exercise 3    Exercise Name 3 -- (B) supine piriformis stretch - modified  -     Cueing 3 -- Verbal;Tactile;Demo  -     Reps 3 -- 5  -     Time 3 -- 10 sec  -        Exercise 4    Exercise Name 4 -- (B) LTR  -     Cueing 4 -- Verbal;Tactile;Demo  -     Reps 4 -- 5  -MH     Time 4 -- 5 sec  -        Exercise 5    Exercise Name 5 -- (B) SKC  -     Cueing 5 -- Verbal;Tactile;Demo  -     Reps 5 -- 5  -MH     Time 5 -- 10 sec  -        Exercise 6    Exercise Name 6 -- PPT  -     Cueing 6 -- Verbal;Tactile;Demo "  -MH     Reps 6 -- 10  -MH     Time 6 -- 5 sec  -MH        Exercise 7    Exercise Name 7 -- PPT with supine clam shell vs TB  -MH     Cueing 7 -- Verbal;Tactile;Demo  -MH     Reps 7 -- 10  -MH     Time 7 -- 5 sec  -MH     Additional Comments -- green  -MH        Exercise 8    Exercise Name 8 -- PPT with hooklying ball squeeze  -     Cueing 8 -- Verbal;Tactile;Demo  -MH     Reps 8 -- 10  -MH     Time 8 -- 5 sec  -MH           User Key  (r) = Recorded By, (t) = Taken By, (c) = Cosigned By    Initials Name Provider Type    Adrian Rodriguez PTA Physical Therapist Assistant                         Manual Rx (last 36 hours)     Manual Treatments     Row Name 11/22/22 1300             Manual Rx 1    Manual Rx 1 Location Pelvis  -      Manual Rx 1 Type MET: shotgun  -      Manual Rx 1 Duration No further MET required  -            User Key  (r) = Recorded By, (t) = Taken By, (c) = Cosigned By    Initials Name Provider Type     Adrian Holbrook PTA Physical Therapist Assistant                    Therapy Education  Education Details: written instruction of ITB and piriformis stretches issued and reviewed  Given: HEP, Symptoms/condition management  Program: New, Reinforced  How Provided: Verbal, Demonstration, Written  Provided to: Patient  Level of Understanding: Teach back education performed, Verbalized, Demonstrated              Time Calculation:   Start Time: 1300  Stop Time: 1336  Time Calculation (min): 36 min  Timed Charges  09791 - PT Therapeutic Exercise Minutes: 30  Total Minutes  Timed Charges Total Minutes: 30   Total Minutes: 30  Therapy Charges for Today     Code Description Service Date Service Provider Modifiers Qty    71590571301 HC PT THER PROC EA 15 MIN 11/22/2022 Adrina Holbrook PTA GP 2                    Adrian Holbrook PTA  11/22/2022

## 2022-11-30 ENCOUNTER — HOSPITAL ENCOUNTER (OUTPATIENT)
Dept: PHYSICAL THERAPY | Facility: HOSPITAL | Age: 72
Setting detail: THERAPIES SERIES
Discharge: HOME | End: 2022-11-30

## 2022-11-30 DIAGNOSIS — M54.31 RIGHT SIDED SCIATICA: Primary | ICD-10-CM

## 2022-11-30 PROCEDURE — 97110 THERAPEUTIC EXERCISES: CPT

## 2022-12-08 ENCOUNTER — DOCUMENTATION (OUTPATIENT)
Dept: PHYSICAL THERAPY | Facility: HOSPITAL | Age: 72
End: 2022-12-08

## 2022-12-08 NOTE — SIGNIFICANT NOTE
Pt cancelled appointment dated 12-7-22.  States she needs to take her  to the doctor.  No further appts scheduled at this time.

## 2023-01-17 ENCOUNTER — OFFICE VISIT (OUTPATIENT)
Dept: INTERNAL MEDICINE | Facility: CLINIC | Age: 73
End: 2023-01-17
Payer: MEDICARE

## 2023-01-17 VITALS
TEMPERATURE: 97.5 F | WEIGHT: 183 LBS | SYSTOLIC BLOOD PRESSURE: 140 MMHG | HEIGHT: 62 IN | BODY MASS INDEX: 33.68 KG/M2 | HEART RATE: 65 BPM | DIASTOLIC BLOOD PRESSURE: 80 MMHG | OXYGEN SATURATION: 98 %

## 2023-01-17 DIAGNOSIS — E66.9 OBESITY (BMI 30-39.9): Primary | ICD-10-CM

## 2023-01-17 DIAGNOSIS — E78.2 MIXED HYPERLIPIDEMIA: Chronic | ICD-10-CM

## 2023-01-17 DIAGNOSIS — M46.1 SACROILIITIS, NOT ELSEWHERE CLASSIFIED: ICD-10-CM

## 2023-01-17 DIAGNOSIS — I10 ESSENTIAL HYPERTENSION: Chronic | ICD-10-CM

## 2023-01-17 DIAGNOSIS — R73.09 OTHER ABNORMAL GLUCOSE: ICD-10-CM

## 2023-01-17 PROCEDURE — 99204 OFFICE O/P NEW MOD 45 MIN: CPT | Performed by: INTERNAL MEDICINE

## 2023-01-17 NOTE — PROGRESS NOTES
"Chief Complaint  Establish Care and discuss sciatica    Subjective        Joan Block presents to [facility]  History of Present Illness     Ms. Block is a tracy 71 yo F who presents to establish care and discuss sciatica.    Has sciatica and goes to PT, chiropractor, ortho.    Does not tolerate statins due to diarrhea.        Objective   Vital Signs:  /80   Pulse 65   Temp 97.5 °F (36.4 °C)   Ht 157.5 cm (62.01\")   Wt 83 kg (183 lb)   SpO2 98%   BMI 33.46 kg/m²   Estimated body mass index is 33.46 kg/m² as calculated from the following:    Height as of this encounter: 157.5 cm (62.01\").    Weight as of this encounter: 83 kg (183 lb).       BMI is >= 30 and <35. (Class 1 Obesity). The following options were offered after discussion;: exercise counseling/recommendations and nutrition counseling/recommendations      Physical Exam   Result Review :  The following data was reviewed by: Twyla Jenkins MD on 01/17/2023:  Common labs    Common Labs 10/19/22 10/19/22 10/19/22    1029 1029 1029   Glucose  102 (A)    BUN  17    Creatinine  0.96    Sodium  140    Potassium  4.9    Chloride  103    Calcium  10.1    Total Protein  6.9    Albumin  4.50    Total Bilirubin  0.3    Alkaline Phosphatase  64    AST (SGOT)  17    ALT (SGPT)  14    WBC   7.50   Hemoglobin   13.1   Hematocrit   41.2   Platelets   333   Total Cholesterol 272 (A)     Triglycerides 108     HDL Cholesterol 50     LDL Cholesterol  203 (A)     (A) Abnormal value       Comments are available for some flowsheets but are not being displayed.           Data reviewed: previous PCP notes, labs             Assessment and Plan   Diagnoses and all orders for this visit:    1. Obesity (BMI 30-39.9) (Primary)  Assessment & Plan:  Patient's (Body mass index is 33.46 kg/m².) indicates that they are obese (BMI >30) with health conditions that include hypertension and dyslipidemias . Weight is improving with lifestyle " modifications. BMI is is above average; BMI management plan is completed. We discussed portion control and increasing exercise.     Orders:  -     Comprehensive Metabolic Panel; Future  -     Hemoglobin A1c; Future  -     T4, Free; Future  -     TSH; Future    2. Essential hypertension  Assessment & Plan:  - Reasonable control today and previously has been well controlled.  Continue current regimen.    Orders:  -     T4, Free; Future  -     TSH; Future    3. Mixed hyperlipidemia  Assessment & Plan:  - Re check lipids and discuss alternatives to statin if still as elevated.  Last LDL was 203    Orders:  -     Lipid Panel With LDL / HDL Ratio; Future  -     T4, Free; Future  -     TSH; Future    4. Other abnormal glucose  -     Hemoglobin A1c; Future    5. Sacroiliitis, not elsewhere classified (HCC)  Assessment & Plan:  Continue f/u with ortho, PT      Other orders  -     Tdap Vaccine Greater Than or Equal To 8yo IM           Follow Up   Return in about 3 months (around 4/17/2023) for sciatica, yearly labs.  Patient was given instructions and counseling regarding her condition or for health maintenance advice. Please see specific information pulled into the AVS if appropriate.

## 2023-01-18 PROBLEM — M46.1 SACROILIITIS, NOT ELSEWHERE CLASSIFIED (HCC): Status: ACTIVE | Noted: 2023-01-18

## 2023-01-18 PROBLEM — E66.9 OBESITY (BMI 30-39.9): Status: ACTIVE | Noted: 2023-01-18

## 2023-01-18 NOTE — ASSESSMENT & PLAN NOTE
Continue f/u with ortho, PT   [Menstruating] : The patient is menstruating [Menarche Age ____] : age at menarche was [unfilled] [Definite ___ (Date)] : the last menstrual period was [unfilled] [Regular Cycle Intervals] : have been regular [Total Preg ___] : G[unfilled]

## 2023-01-18 NOTE — ASSESSMENT & PLAN NOTE
Patient's (Body mass index is 33.46 kg/m².) indicates that they are obese (BMI >30) with health conditions that include hypertension and dyslipidemias . Weight is improving with lifestyle modifications. BMI is is above average; BMI management plan is completed. We discussed portion control and increasing exercise.

## 2023-04-10 DIAGNOSIS — R73.09 OTHER ABNORMAL GLUCOSE: ICD-10-CM

## 2023-04-10 DIAGNOSIS — I10 ESSENTIAL HYPERTENSION: Chronic | ICD-10-CM

## 2023-04-10 DIAGNOSIS — E78.2 MIXED HYPERLIPIDEMIA: Chronic | ICD-10-CM

## 2023-04-10 DIAGNOSIS — E66.9 OBESITY (BMI 30-39.9): ICD-10-CM

## 2023-04-12 LAB
ALBUMIN SERPL-MCNC: 4.5 G/DL (ref 3.5–5.2)
ALBUMIN/GLOB SERPL: 2 G/DL
ALP SERPL-CCNC: 60 U/L (ref 39–117)
ALT SERPL-CCNC: 17 U/L (ref 1–33)
AST SERPL-CCNC: 14 U/L (ref 1–32)
BILIRUB SERPL-MCNC: 0.5 MG/DL (ref 0–1.2)
BUN SERPL-MCNC: 19 MG/DL (ref 8–23)
BUN/CREAT SERPL: 19.8 (ref 7–25)
CALCIUM SERPL-MCNC: 10.1 MG/DL (ref 8.6–10.5)
CHLORIDE SERPL-SCNC: 105 MMOL/L (ref 98–107)
CHOLEST SERPL-MCNC: 274 MG/DL (ref 0–200)
CO2 SERPL-SCNC: 26.4 MMOL/L (ref 22–29)
CREAT SERPL-MCNC: 0.96 MG/DL (ref 0.57–1)
EGFRCR SERPLBLD CKD-EPI 2021: 63.8 ML/MIN/1.73
GLOBULIN SER CALC-MCNC: 2.2 GM/DL
GLUCOSE SERPL-MCNC: 100 MG/DL (ref 65–99)
HBA1C MFR BLD: 6.1 % (ref 4.8–5.6)
HDLC SERPL-MCNC: 54 MG/DL (ref 40–60)
LDLC SERPL CALC-MCNC: 202 MG/DL (ref 0–100)
LDLC/HDLC SERPL: 3.69 {RATIO}
POTASSIUM SERPL-SCNC: 4.9 MMOL/L (ref 3.5–5.2)
PROT SERPL-MCNC: 6.7 G/DL (ref 6–8.5)
SODIUM SERPL-SCNC: 139 MMOL/L (ref 136–145)
T4 FREE SERPL-MCNC: 1.24 NG/DL (ref 0.93–1.7)
TRIGL SERPL-MCNC: 104 MG/DL (ref 0–150)
TSH SERPL DL<=0.005 MIU/L-ACNC: 1.73 UIU/ML (ref 0.27–4.2)
VLDLC SERPL CALC-MCNC: 18 MG/DL (ref 5–40)

## 2023-04-17 ENCOUNTER — OFFICE VISIT (OUTPATIENT)
Dept: INTERNAL MEDICINE | Facility: CLINIC | Age: 73
End: 2023-04-17
Payer: MEDICARE

## 2023-04-17 VITALS
TEMPERATURE: 98.5 F | OXYGEN SATURATION: 95 % | BODY MASS INDEX: 34.74 KG/M2 | WEIGHT: 188.8 LBS | HEIGHT: 62 IN | RESPIRATION RATE: 18 BRPM | DIASTOLIC BLOOD PRESSURE: 94 MMHG | SYSTOLIC BLOOD PRESSURE: 160 MMHG | HEART RATE: 89 BPM

## 2023-04-17 DIAGNOSIS — E78.2 MIXED HYPERLIPIDEMIA: Chronic | ICD-10-CM

## 2023-04-17 DIAGNOSIS — I10 ESSENTIAL HYPERTENSION: Chronic | ICD-10-CM

## 2023-04-17 DIAGNOSIS — M46.1 SACROILIITIS, NOT ELSEWHERE CLASSIFIED: Primary | ICD-10-CM

## 2023-04-17 DIAGNOSIS — E66.9 OBESITY (BMI 30-39.9): ICD-10-CM

## 2023-04-17 DIAGNOSIS — R73.03 PREDIABETES: ICD-10-CM

## 2023-04-17 NOTE — PROGRESS NOTES
"      Joan Block is a 72 y.o. female who presents with a chief complaint of   Chief Complaint   Patient presents with   • Hypertension     3 month follow up       HPI     Has gained about 8 lbs since February because she is having a lot of sciatic pain.  Has gone previously to PT, ortho without effects.  Has relief with Motrin.     Discussed trying some back exercises and working on posture while washing dishes.     Will work on dietary changes for diabetes.      Will increase Lisinopril to 20 mg and let me know how it is going.     Cholesterol elevation--statin intolerant.      The following portions of the patient's history were reviewed and updated as appropriate: allergies, current medications, past family history, past medical history, past social history, past surgical history and problem list.      Current Outpatient Medications:   •  lisinopril (PRINIVIL,ZESTRIL) 10 MG tablet, Take 1 tablet by mouth Daily., Disp: 90 tablet, Rfl: 1            Physical Exam  /94 (BP Location: Left arm, Patient Position: Sitting, Cuff Size: Adult)   Pulse 89   Temp 98.5 °F (36.9 °C) (Infrared)   Resp 18   Ht 157.5 cm (62.01\")   Wt 85.6 kg (188 lb 12.8 oz)   SpO2 95%   BMI 34.52 kg/m²     Physical Exam  Vitals reviewed.   Constitutional:       General: She is not in acute distress.     Appearance: Normal appearance.   HENT:      Head: Normocephalic and atraumatic.      Nose: Nose normal.      Mouth/Throat:      Mouth: Mucous membranes are moist.   Eyes:      Conjunctiva/sclera: Conjunctivae normal.   Cardiovascular:      Rate and Rhythm: Normal rate and regular rhythm.      Pulses: Normal pulses.      Heart sounds: Normal heart sounds.   Pulmonary:      Effort: Pulmonary effort is normal.      Breath sounds: Normal breath sounds.   Abdominal:      Palpations: Abdomen is soft.      Tenderness: There is no abdominal tenderness.   Musculoskeletal:      Right lower leg: No edema.      Left lower leg: No edema. "   Skin:     General: Skin is warm and dry.   Neurological:      General: No focal deficit present.      Mental Status: She is alert.   Psychiatric:         Mood and Affect: Mood normal.           Results for orders placed or performed in visit on 04/10/23   TSH    Specimen: Blood   Result Value Ref Range    TSH 1.730 0.270 - 4.200 uIU/mL   T4, Free    Specimen: Blood   Result Value Ref Range    Free T4 1.24 0.93 - 1.70 ng/dL   Hemoglobin A1c    Specimen: Blood   Result Value Ref Range    Hemoglobin A1C 6.10 (H) 4.80 - 5.60 %   Lipid Panel With LDL / HDL Ratio    Specimen: Blood   Result Value Ref Range    Total Cholesterol 274 (H) 0 - 200 mg/dL    Triglycerides 104 0 - 150 mg/dL    HDL Cholesterol 54 40 - 60 mg/dL    VLDL Cholesterol Zachariah 18 5 - 40 mg/dL    LDL Chol Calc (NIH) 202 (H) 0 - 100 mg/dL    LDL/HDL RATIO 3.69    Comprehensive Metabolic Panel    Specimen: Blood   Result Value Ref Range    Glucose 100 (H) 65 - 99 mg/dL    BUN 19 8 - 23 mg/dL    Creatinine 0.96 0.57 - 1.00 mg/dL    EGFR Result 63.8 >60.0 mL/min/1.73    BUN/Creatinine Ratio 19.8 7.0 - 25.0    Sodium 139 136 - 145 mmol/L    Potassium 4.9 3.5 - 5.2 mmol/L    Chloride 105 98 - 107 mmol/L    Total CO2 26.4 22.0 - 29.0 mmol/L    Calcium 10.1 8.6 - 10.5 mg/dL    Total Protein 6.7 6.0 - 8.5 g/dL    Albumin 4.5 3.5 - 5.2 g/dL    Globulin 2.2 gm/dL    A/G Ratio 2.0 g/dL    Total Bilirubin 0.5 0.0 - 1.2 mg/dL    Alkaline Phosphatase 60 39 - 117 U/L    AST (SGOT) 14 1 - 32 U/L    ALT (SGPT) 17 1 - 33 U/L           Diagnoses and all orders for this visit:    1. Sacroiliitis, not elsewhere classified (Primary)  Assessment & Plan:  Provided back exercises, advice about posture and inserts/supportive shoes.  Advised on other conservative measures       2. Obesity (BMI 30-39.9)  Assessment & Plan:  Discussed ways to work on dietary change and get moving more       3. Prediabetes    4. Mixed hyperlipidemia  Assessment & Plan:  She will work hard on  dietary/movement changes discussed today and we will re-assess lipids in 6 months      5. Essential hypertension  Assessment & Plan:  Increase Lisinopril to 20 mg and will let me know how she is doing.  Will send 20 mg tabs if doing well

## 2023-04-18 PROBLEM — R73.03 PREDIABETES: Status: ACTIVE | Noted: 2023-04-18

## 2023-04-18 NOTE — ASSESSMENT & PLAN NOTE
Provided back exercises, advice about posture and inserts/supportive shoes.  Advised on other conservative measures

## 2023-04-18 NOTE — ASSESSMENT & PLAN NOTE
She will work hard on dietary/movement changes discussed today and we will re-assess lipids in 6 months

## 2023-04-18 NOTE — ASSESSMENT & PLAN NOTE
Increase Lisinopril to 20 mg and will let me know how she is doing.  Will send 20 mg tabs if doing well

## 2023-04-25 DIAGNOSIS — I10 ESSENTIAL HYPERTENSION: ICD-10-CM

## 2023-04-25 RX ORDER — LISINOPRIL 10 MG/1
10 TABLET ORAL DAILY
Qty: 90 TABLET | Refills: 1 | Status: SHIPPED | OUTPATIENT
Start: 2023-04-25

## 2023-04-25 NOTE — TELEPHONE ENCOUNTER
Caller: Joan Block    Relationship: Self    Best call back number: [phone] [facility]  [phone] HOME    Requested Prescriptions:   Requested Prescriptions     Pending Prescriptions Disp Refills   • lisinopril (PRINIVIL,ZESTRIL) 10 MG tablet 90 tablet 1     Sig: Take 1 tablet by mouth Daily.        Pharmacy where request should be sent: [facility] [address] [place] [address] [phone] [facility] [phone]      Last office visit with prescribing clinician: 4/17/2023   Last telemedicine visit with prescribing clinician: 10/10/2023   Next office visit with prescribing clinician: 10/24/2023     Additional details provided by patient: PATIENT STATES THAT SHE IS SUPPOSE TO START TAKING 20 MG      PATIENT STATES THAT SHE TOOK THE LAST 10 MG TODAY 4/25/23    Does the patient have less than a 3 day supply:  [x] Yes  [] No    Would you like a call back once the refill request has been completed: [] Yes [] No    If the office needs to give you a call back, can they leave a voicemail: [x] Yes [] No    Katerine Rey Rep   04/25/23 13:09 EDT

## 2023-06-12 DIAGNOSIS — I10 ESSENTIAL HYPERTENSION: ICD-10-CM

## 2023-06-12 RX ORDER — LISINOPRIL 10 MG/1
10 TABLET ORAL DAILY
Qty: 90 TABLET | Refills: 1 | Status: SHIPPED | OUTPATIENT
Start: 2023-06-12

## 2023-08-22 ENCOUNTER — PATIENT MESSAGE (OUTPATIENT)
Dept: INTERNAL MEDICINE | Facility: CLINIC | Age: 73
End: 2023-08-22
Payer: MEDICARE

## 2023-08-22 DIAGNOSIS — I10 ESSENTIAL HYPERTENSION: ICD-10-CM

## 2023-08-22 RX ORDER — LISINOPRIL 20 MG/1
20 TABLET ORAL DAILY
Qty: 90 TABLET | Refills: 1 | Status: SHIPPED | OUTPATIENT
Start: 2023-08-22 | End: 2023-08-24 | Stop reason: SDUPTHER

## 2023-08-22 NOTE — TELEPHONE ENCOUNTER
I tried to call the Pt regarding this RX.   I see back in April you increased her to 20 mg.  She was supposed to get back to you on how she was doing with the change, and if she did well, you would write a new script for the dose change to 20 mg.    I left her a message on her MyChart to see if she will get back to me. In the mean time, do you want me to refill the 10 mg she is asking for, or just write a new one for 20mg?    Rx Refill Note  Requested Prescriptions     Pending Prescriptions Disp Refills    lisinopril (PRINIVIL,ZESTRIL) 10 MG tablet 90 tablet 1     Sig: Take 1 tablet by mouth Daily.      Last office visit with prescribing clinician: 4/17/2023   Last telemedicine visit with prescribing clinician: Visit date not found   Next office visit with prescribing clinician: 10/24/2023                         Would you like a call back once the refill request has been completed: [] Yes [] No    If the office needs to give you a call back, can they leave a voicemail: [] Yes [] No    Eli Dacosta, PCT  08/22/23, 12:03 EDT

## 2023-08-24 RX ORDER — LISINOPRIL 20 MG/1
20 TABLET ORAL DAILY
Qty: 90 TABLET | Refills: 3 | Status: SHIPPED | OUTPATIENT
Start: 2023-08-24

## 2023-08-24 NOTE — TELEPHONE ENCOUNTER
From: Eli Dacosta, MARILEE  To: Joan Block  Sent: 8/22/2023 11:59 AM EDT  Subject: Lisinopril RX refill request    Erik Franco,    I just tried to call you regarding your request for a refill on the lisinopril 10 mg, but there is not a voicemail set up. I see where you and Dr Jenkins talked about increasing to 20 mg back in April. She said after taking 20 mg for a awhile she wanted you to let her know if you were doing well at that dose, and, if so, she would write you a new RX for the dose change.     Are you doing well with the 20 mg? If so, she can change your RX to read 20 mg tablets.     Thank you,  SHAHLA Benitez

## 2023-10-03 ENCOUNTER — TELEPHONE (OUTPATIENT)
Dept: INTERNAL MEDICINE | Facility: CLINIC | Age: 73
End: 2023-10-03

## 2023-10-03 NOTE — TELEPHONE ENCOUNTER
Hub staff attempted to follow warm transfer process and was unsuccessful     Caller: VESNA LEON    Relationship to patient: SELF     Best call back number:   VESNA LEON () [phone] (Mobile)       Patient is needing:  NEEDING TO RESCHEDULE  LAB APPT

## 2023-10-10 ENCOUNTER — TELEPHONE (OUTPATIENT)
Dept: INTERNAL MEDICINE | Facility: CLINIC | Age: 73
End: 2023-10-10
Payer: MEDICARE

## 2023-10-17 DIAGNOSIS — E66.9 OBESITY (BMI 30-39.9): ICD-10-CM

## 2023-10-17 DIAGNOSIS — R73.03 PREDIABETES: ICD-10-CM

## 2023-10-17 DIAGNOSIS — E78.2 MIXED HYPERLIPIDEMIA: Primary | Chronic | ICD-10-CM

## 2023-10-17 DIAGNOSIS — E78.2 MIXED HYPERLIPIDEMIA: Chronic | ICD-10-CM

## 2023-10-18 ENCOUNTER — TRANSCRIBE ORDERS (OUTPATIENT)
Dept: ADMINISTRATIVE | Facility: HOSPITAL | Age: 73
End: 2023-10-18
Payer: MEDICARE

## 2023-10-18 DIAGNOSIS — Z12.31 SCREENING MAMMOGRAM FOR HIGH-RISK PATIENT: Primary | ICD-10-CM

## 2023-10-18 DIAGNOSIS — Z12.31 ENCOUNTER FOR SCREENING MAMMOGRAM FOR MALIGNANT NEOPLASM OF BREAST: ICD-10-CM

## 2023-10-18 LAB
CHOLEST SERPL-MCNC: 245 MG/DL (ref 0–200)
HBA1C MFR BLD: 6.5 % (ref 4.8–5.6)
HDLC SERPL-MCNC: 47 MG/DL (ref 40–60)
LDLC SERPL CALC-MCNC: 177 MG/DL (ref 0–100)
TRIGL SERPL-MCNC: 117 MG/DL (ref 0–150)
VLDLC SERPL CALC-MCNC: 21 MG/DL (ref 5–40)

## 2023-10-24 ENCOUNTER — OFFICE VISIT (OUTPATIENT)
Dept: INTERNAL MEDICINE | Facility: CLINIC | Age: 73
End: 2023-10-24
Payer: MEDICARE

## 2023-10-24 VITALS
HEART RATE: 92 BPM | OXYGEN SATURATION: 95 % | DIASTOLIC BLOOD PRESSURE: 82 MMHG | WEIGHT: 181 LBS | TEMPERATURE: 97.7 F | HEIGHT: 62 IN | SYSTOLIC BLOOD PRESSURE: 142 MMHG | BODY MASS INDEX: 33.31 KG/M2

## 2023-10-24 DIAGNOSIS — I10 ESSENTIAL HYPERTENSION: Primary | Chronic | ICD-10-CM

## 2023-10-24 DIAGNOSIS — Z23 NEED FOR VACCINATION: ICD-10-CM

## 2023-10-24 DIAGNOSIS — E11.65 TYPE 2 DIABETES MELLITUS WITH HYPERGLYCEMIA, WITHOUT LONG-TERM CURRENT USE OF INSULIN: ICD-10-CM

## 2023-10-24 DIAGNOSIS — E78.2 MIXED HYPERLIPIDEMIA: Chronic | ICD-10-CM

## 2023-10-24 DIAGNOSIS — E66.9 OBESITY (BMI 30-39.9): ICD-10-CM

## 2023-10-24 PROCEDURE — 1160F RVW MEDS BY RX/DR IN RCRD: CPT | Performed by: INTERNAL MEDICINE

## 2023-10-24 PROCEDURE — 3044F HG A1C LEVEL LT 7.0%: CPT | Performed by: INTERNAL MEDICINE

## 2023-10-24 PROCEDURE — 99214 OFFICE O/P EST MOD 30 MIN: CPT | Performed by: INTERNAL MEDICINE

## 2023-10-24 PROCEDURE — 3077F SYST BP >= 140 MM HG: CPT | Performed by: INTERNAL MEDICINE

## 2023-10-24 PROCEDURE — G0008 ADMIN INFLUENZA VIRUS VAC: HCPCS | Performed by: INTERNAL MEDICINE

## 2023-10-24 PROCEDURE — 90662 IIV NO PRSV INCREASED AG IM: CPT | Performed by: INTERNAL MEDICINE

## 2023-10-24 PROCEDURE — 1159F MED LIST DOCD IN RCRD: CPT | Performed by: INTERNAL MEDICINE

## 2023-10-24 PROCEDURE — 3079F DIAST BP 80-89 MM HG: CPT | Performed by: INTERNAL MEDICINE

## 2023-10-24 RX ORDER — METFORMIN HYDROCHLORIDE 500 MG/1
500 TABLET, EXTENDED RELEASE ORAL
Qty: 90 TABLET | Refills: 1 | Status: SHIPPED | OUTPATIENT
Start: 2023-10-24

## 2023-10-24 RX ORDER — DICLOFENAC SODIUM 75 MG/1
75 TABLET, DELAYED RELEASE ORAL AS NEEDED
COMMUNITY
End: 2023-10-26 | Stop reason: SDUPTHER

## 2023-10-24 NOTE — PROGRESS NOTES
"      Joan Block is a 73 y.o. female who presents with a chief complaint of   Chief Complaint   Patient presents with    Hypertension     F/u       HPI     BP a little high today--monitor.      The following portions of the patient's history were reviewed and updated as appropriate: allergies, current medications, past family history, past medical history, past social history, past surgical history and problem list.      Current Outpatient Medications:     lisinopril (PRINIVIL,ZESTRIL) 20 MG tablet, Take 1 tablet by mouth Daily., Disp: 90 tablet, Rfl: 3    diclofenac (VOLTAREN) 75 MG EC tablet, Take 1 tablet by mouth As Needed (pain). Pt reports taking 2-3 times weekly, Disp: 30 tablet, Rfl: 1    metFORMIN ER (GLUCOPHAGE-XR) 500 MG 24 hr tablet, Take 1 tablet by mouth Daily With Breakfast., Disp: 90 tablet, Rfl: 1            Physical Exam  /82 (BP Location: Left arm, Patient Position: Sitting, Cuff Size: Adult)   Pulse 92   Temp 97.7 °F (36.5 °C) (Infrared)   Ht 157.5 cm (62.01\")   Wt 82.1 kg (181 lb)   SpO2 95%   BMI 33.09 kg/m²     Physical Exam  Vitals reviewed.   Constitutional:       General: She is not in acute distress.     Appearance: Normal appearance.   HENT:      Head: Normocephalic and atraumatic.      Nose: Nose normal.      Mouth/Throat:      Mouth: Mucous membranes are moist.   Eyes:      Conjunctiva/sclera: Conjunctivae normal.   Cardiovascular:      Rate and Rhythm: Normal rate and regular rhythm.      Pulses: Normal pulses.      Heart sounds: Normal heart sounds.   Pulmonary:      Effort: Pulmonary effort is normal.      Breath sounds: Normal breath sounds.   Abdominal:      Palpations: Abdomen is soft.      Tenderness: There is no abdominal tenderness.   Musculoskeletal:      Right lower leg: No edema.      Left lower leg: No edema.   Skin:     General: Skin is warm and dry.   Neurological:      General: No focal deficit present.      Mental Status: She is alert.   Psychiatric:  "        Mood and Affect: Mood normal.           Results for orders placed or performed in visit on 10/17/23   Lipid panel    Specimen: Blood   Result Value Ref Range    Total Cholesterol 245 (H) 0 - 200 mg/dL    Triglycerides 117 0 - 150 mg/dL    HDL Cholesterol 47 40 - 60 mg/dL    VLDL Cholesterol Zachariah 21 5 - 40 mg/dL    LDL Chol Calc (NIH) 177 (H) 0 - 100 mg/dL   Hemoglobin A1c    Specimen: Blood   Result Value Ref Range    Hemoglobin A1C 6.50 (H) 4.80 - 5.60 %           Diagnoses and all orders for this visit:    1. Essential hypertension (Primary)    2. Mixed hyperlipidemia    3. Obesity (BMI 30-39.9)    4. Type 2 diabetes mellitus with hyperglycemia, without long-term current use of insulin  -     metFORMIN ER (GLUCOPHAGE-XR) 500 MG 24 hr tablet; Take 1 tablet by mouth Daily With Breakfast.  Dispense: 90 tablet; Refill: 1  -     Hemoglobin A1c; Future    5. Need for vaccination  -     Fluzone High-Dose 65+yrs (0964-1335)      BP slightly high, but okay at home, monitor    Discussed alternatives to statins again as she is intolerant.  Wants a little more time to consider and work on new diagnosis diabetes    New diagnosis diabetes explained and discussed.  Decision to start metformin.  Discussed dietary and movement changes as well.      Discussed lung cancer screening and she will consider.  Flu shot today

## 2023-10-26 RX ORDER — DICLOFENAC SODIUM 75 MG/1
75 TABLET, DELAYED RELEASE ORAL AS NEEDED
Qty: 30 TABLET | Refills: 1 | Status: SHIPPED | OUTPATIENT
Start: 2023-10-26

## 2023-10-26 NOTE — TELEPHONE ENCOUNTER
Rx Refill Note  Requested Prescriptions     Pending Prescriptions Disp Refills    diclofenac (VOLTAREN) 75 MG EC tablet       Sig: Take 1 tablet by mouth As Needed. Pt reports taking 2-3 times weekly      Last office visit with prescribing clinician: 10/24/2023   Last telemedicine visit with prescribing clinician: Visit date not found   Next office visit with prescribing clinician: 4/23/2024                         Would you like a call back once the refill request has been completed: [] Yes [] No    If the office needs to give you a call back, can they leave a voicemail: [] Yes [] No    Trisha Trammell, PCT  10/26/23, 10:41 EDT

## 2023-11-07 ENCOUNTER — HOSPITAL ENCOUNTER (OUTPATIENT)
Dept: MAMMOGRAPHY | Facility: HOSPITAL | Age: 73
Discharge: HOME | End: 2023-11-07
Admitting: INTERNAL MEDICINE
Payer: MEDICARE

## 2023-11-07 DIAGNOSIS — Z12.31 ENCOUNTER FOR SCREENING MAMMOGRAM FOR MALIGNANT NEOPLASM OF BREAST: ICD-10-CM

## 2023-11-07 PROCEDURE — 77067 SCR MAMMO BI INCL CAD: CPT

## 2023-11-07 PROCEDURE — 77063 BREAST TOMOSYNTHESIS BI: CPT

## 2024-01-17 DIAGNOSIS — E11.65 TYPE 2 DIABETES MELLITUS WITH HYPERGLYCEMIA, WITHOUT LONG-TERM CURRENT USE OF INSULIN: ICD-10-CM

## 2024-01-24 LAB — HBA1C MFR BLD: 6.1 % (ref 4.8–5.6)

## 2024-03-18 ENCOUNTER — TELEPHONE (OUTPATIENT)
Dept: INTERNAL MEDICINE | Facility: CLINIC | Age: 74
End: 2024-03-18

## 2024-03-18 NOTE — TELEPHONE ENCOUNTER
Hub staff attempted to follow warm transfer process and was unsuccessful     Caller: Joan Block    Relationship to patient: Self    Best call back number:     [phone]       Patient is needing:     PATIENT IS NEEDING TO CANCEL HER 4/16/2024 LAB APPOINTMENT AND RESCHEDULE IT FOR A TIME IN MAY NEAR HER APPOINTMENT.      PLEASE CALL AND SCHEDULE

## 2024-05-01 ENCOUNTER — TELEPHONE (OUTPATIENT)
Dept: INTERNAL MEDICINE | Facility: CLINIC | Age: 74
End: 2024-05-01
Payer: MEDICARE

## 2024-05-14 DIAGNOSIS — I10 ESSENTIAL HYPERTENSION: Chronic | ICD-10-CM

## 2024-05-14 DIAGNOSIS — E66.9 OBESITY (BMI 30-39.9): ICD-10-CM

## 2024-05-14 DIAGNOSIS — R73.03 PREDIABETES: ICD-10-CM

## 2024-05-14 DIAGNOSIS — Z13.0 SCREENING FOR DEFICIENCY ANEMIA: ICD-10-CM

## 2024-05-14 DIAGNOSIS — E78.2 MIXED HYPERLIPIDEMIA: Chronic | ICD-10-CM

## 2024-05-16 LAB
ALBUMIN SERPL-MCNC: 4.2 G/DL (ref 3.5–5.2)
ALBUMIN/GLOB SERPL: 1.5 G/DL
ALP SERPL-CCNC: 73 U/L (ref 39–117)
ALT SERPL-CCNC: 11 U/L (ref 1–33)
AST SERPL-CCNC: 15 U/L (ref 1–32)
BASOPHILS # BLD AUTO: 0.06 10*3/MM3 (ref 0–0.2)
BASOPHILS NFR BLD AUTO: 0.7 % (ref 0–1.5)
BILIRUB SERPL-MCNC: <0.2 MG/DL (ref 0–1.2)
BUN SERPL-MCNC: 17 MG/DL (ref 8–23)
BUN/CREAT SERPL: 18.7 (ref 7–25)
CALCIUM SERPL-MCNC: 9.8 MG/DL (ref 8.6–10.5)
CHLORIDE SERPL-SCNC: 104 MMOL/L (ref 98–107)
CHOLEST SERPL-MCNC: 218 MG/DL (ref 0–200)
CO2 SERPL-SCNC: 24.7 MMOL/L (ref 22–29)
CREAT SERPL-MCNC: 0.91 MG/DL (ref 0.57–1)
EGFRCR SERPLBLD CKD-EPI 2021: 67.6 ML/MIN/1.73
EOSINOPHIL # BLD AUTO: 0.14 10*3/MM3 (ref 0–0.4)
EOSINOPHIL NFR BLD AUTO: 1.7 % (ref 0.3–6.2)
ERYTHROCYTE [DISTWIDTH] IN BLOOD BY AUTOMATED COUNT: 13.4 % (ref 12.3–15.4)
GLOBULIN SER CALC-MCNC: 2.8 GM/DL
GLUCOSE SERPL-MCNC: 102 MG/DL (ref 65–99)
HBA1C MFR BLD: 6 % (ref 4.8–5.6)
HCT VFR BLD AUTO: 42.4 % (ref 34–46.6)
HDLC SERPL-MCNC: 42 MG/DL (ref 40–60)
HGB BLD-MCNC: 13.7 G/DL (ref 12–15.9)
IMM GRANULOCYTES # BLD AUTO: 0.16 10*3/MM3 (ref 0–0.05)
IMM GRANULOCYTES NFR BLD AUTO: 1.9 % (ref 0–0.5)
LDLC SERPL CALC-MCNC: 159 MG/DL (ref 0–100)
LDLC/HDLC SERPL: 3.75 {RATIO}
LYMPHOCYTES # BLD AUTO: 1.98 10*3/MM3 (ref 0.7–3.1)
LYMPHOCYTES NFR BLD AUTO: 23.5 % (ref 19.6–45.3)
MCH RBC QN AUTO: 27.3 PG (ref 26.6–33)
MCHC RBC AUTO-ENTMCNC: 32.3 G/DL (ref 31.5–35.7)
MCV RBC AUTO: 84.6 FL (ref 79–97)
MONOCYTES # BLD AUTO: 0.46 10*3/MM3 (ref 0.1–0.9)
MONOCYTES NFR BLD AUTO: 5.5 % (ref 5–12)
NEUTROPHILS # BLD AUTO: 5.63 10*3/MM3 (ref 1.7–7)
NEUTROPHILS NFR BLD AUTO: 66.7 % (ref 42.7–76)
NRBC BLD AUTO-RTO: 0 /100 WBC (ref 0–0.2)
PLATELET # BLD AUTO: 319 10*3/MM3 (ref 140–450)
POTASSIUM SERPL-SCNC: 4.9 MMOL/L (ref 3.5–5.2)
PROT SERPL-MCNC: 7 G/DL (ref 6–8.5)
RBC # BLD AUTO: 5.01 10*6/MM3 (ref 3.77–5.28)
SODIUM SERPL-SCNC: 141 MMOL/L (ref 136–145)
TRIGL SERPL-MCNC: 93 MG/DL (ref 0–150)
VLDLC SERPL CALC-MCNC: 17 MG/DL (ref 5–40)
WBC # BLD AUTO: 8.43 10*3/MM3 (ref 3.4–10.8)

## 2024-05-22 ENCOUNTER — OFFICE VISIT (OUTPATIENT)
Dept: INTERNAL MEDICINE | Facility: CLINIC | Age: 74
End: 2024-05-22
Payer: MEDICARE

## 2024-05-22 VITALS
HEIGHT: 62 IN | BODY MASS INDEX: 32.9 KG/M2 | TEMPERATURE: 97 F | WEIGHT: 178.8 LBS | DIASTOLIC BLOOD PRESSURE: 82 MMHG | HEART RATE: 67 BPM | OXYGEN SATURATION: 96 % | SYSTOLIC BLOOD PRESSURE: 172 MMHG

## 2024-05-22 DIAGNOSIS — Z00.00 MEDICARE ANNUAL WELLNESS VISIT, SUBSEQUENT: Primary | ICD-10-CM

## 2024-05-22 DIAGNOSIS — I10 ESSENTIAL HYPERTENSION: Chronic | ICD-10-CM

## 2024-05-22 DIAGNOSIS — J40 BRONCHITIS: ICD-10-CM

## 2024-05-22 DIAGNOSIS — E11.9 TYPE 2 DIABETES MELLITUS WITHOUT COMPLICATION, WITHOUT LONG-TERM CURRENT USE OF INSULIN: ICD-10-CM

## 2024-05-22 DIAGNOSIS — E78.2 MIXED HYPERLIPIDEMIA: Chronic | ICD-10-CM

## 2024-05-22 LAB
EXPIRATION DATE: NORMAL
Lab: NORMAL
POC CREATININE URINE: NORMAL
POC MICROALBUMIN URINE: NORMAL

## 2024-05-22 RX ORDER — AZITHROMYCIN 250 MG/1
TABLET, FILM COATED ORAL
Qty: 6 TABLET | Refills: 0 | Status: SHIPPED | OUTPATIENT
Start: 2024-05-22

## 2024-05-22 RX ORDER — METFORMIN HYDROCHLORIDE 500 MG/1
500 TABLET, EXTENDED RELEASE ORAL
Qty: 90 TABLET | Refills: 3 | Status: SHIPPED | OUTPATIENT
Start: 2024-05-22

## 2024-05-22 RX ORDER — LISINOPRIL 20 MG/1
20 TABLET ORAL DAILY
Qty: 90 TABLET | Refills: 3 | Status: SHIPPED | OUTPATIENT
Start: 2024-05-22

## 2024-05-22 NOTE — PROGRESS NOTES
The ABCs of the Annual Wellness Visit  Subsequent Medicare Wellness Visit    Chief Complaint   Patient presents with    Medicare Wellness-subsequent      Subjective    History of Present Illness:  Joan Block is a 73 y.o. female who presents for a Subsequent Medicare Wellness Visit.    The following portions of the patient's history were reviewed and   updated as appropriate: allergies, current medications, past family history, past medical history, past social history, past surgical history, and problem list.    Compared to one year ago, the patient feels her physical   health is the same.    Compared to one year ago, the patient feels her mental   health is the same.    Recent Hospitalizations:  She was not admitted to the hospital during the last year.       Current Medical Providers:  Patient Care Team:  Twyla Jenkins MD as PCP - General (Internal Medicine)  Norman Brown MD as Surgeon (Orthopedic Surgery)  Kassi Rios APRN (Dermatology)    Outpatient Medications Prior to Visit   Medication Sig Dispense Refill    diclofenac (VOLTAREN) 75 MG EC tablet Take 1 tablet by mouth As Needed (pain). Pt reports taking 2-3 times weekly 30 tablet 1    lisinopril (PRINIVIL,ZESTRIL) 20 MG tablet Take 1 tablet by mouth Daily. 90 tablet 3    metFORMIN ER (GLUCOPHAGE-XR) 500 MG 24 hr tablet Take 1 tablet by mouth Daily With Breakfast. 90 tablet 1     No facility-administered medications prior to visit.       No opioid medication identified on active medication list. I have reviewed chart for other potential  high risk medication/s and harmful drug interactions in the elderly.        Aspirin is not on active medication list.  Aspirin use is not indicated based on review of current medical condition/s. Risk of harm outweighs potential benefits.  .    Patient Active Problem List   Diagnosis    Distal radial fracture    Closed fracture of left distal radius    Fracture, Colles, left, closed    Essential  "hypertension    Mixed hyperlipidemia    Obesity (BMI 30-39.9)    Sacroiliitis, not elsewhere classified    Prediabetes    Type 2 diabetes mellitus without complication, without long-term current use of insulin     Advance Care Planning  Advance Directive is not on file.  ACP discussion was held with the patient during this visit. Patient does not have an advance directive, information provided.          Objective    Vitals:    05/22/24 0817   BP: 172/82   BP Location: Left arm   Patient Position: Sitting   Cuff Size: Adult   Pulse: 67   Temp: 97 °F (36.1 °C)   TempSrc: Infrared   SpO2: 96%   Weight: 81.1 kg (178 lb 12.8 oz)   Height: 157.5 cm (62.01\")     Estimated body mass index is 32.69 kg/m² as calculated from the following:    Height as of this encounter: 157.5 cm (62.01\").    Weight as of this encounter: 81.1 kg (178 lb 12.8 oz).    BMI is >= 30 and <35. (Class 1 Obesity). The following options were offered after discussion;: information on healthy weight added to patient's after visit summary       Does the patient have evidence of cognitive impairment? No    Physical Exam  Vitals reviewed.   Constitutional:       General: She is not in acute distress.     Appearance: Normal appearance.   HENT:      Head: Normocephalic and atraumatic.      Nose: Nose normal.      Mouth/Throat:      Mouth: Mucous membranes are moist.   Eyes:      Conjunctiva/sclera: Conjunctivae normal.   Cardiovascular:      Rate and Rhythm: Normal rate and regular rhythm.      Heart sounds: Normal heart sounds.   Pulmonary:      Effort: Pulmonary effort is normal.      Breath sounds: Normal breath sounds.   Skin:     General: Skin is warm and dry.   Neurological:      General: No focal deficit present.      Mental Status: She is alert.   Psychiatric:         Mood and Affect: Mood normal.       Lab Results   Component Value Date    CHLPL 218 (H) 05/15/2024    TRIG 93 05/15/2024    HDL 42 05/15/2024     (H) 05/15/2024    VLDL 17 " 05/15/2024    HGBA1C 6.00 (H) 05/15/2024            HEALTH RISK ASSESSMENT    Smoking Status:  Social History     Tobacco Use   Smoking Status Former    Current packs/day: 0.00    Average packs/day: 0.5 packs/day for 45.0 years (22.5 ttl pk-yrs)    Types: Cigarettes    Start date: 1970    Quit date: 3/2/2015    Years since quittin.2   Smokeless Tobacco Never     Alcohol Consumption:  Social History     Substance and Sexual Activity   Alcohol Use No     Fall Risk Screen:    STEADI Fall Risk Assessment was completed, and patient is at LOW risk for falls.Assessment completed on:2024    Depression Screenin/22/2024     8:19 AM   PHQ-2/PHQ-9 Depression Screening   Little Interest or Pleasure in Doing Things 1-->several days   Feeling Down, Depressed or Hopeless 0-->not at all   PHQ-9: Brief Depression Severity Measure Score 1       Health Habits and Functional and Cognitive Screenin/21/2024     5:08 PM   Functional & Cognitive Status   Do you have difficulty preparing food and eating? No   Do you have difficulty bathing yourself, getting dressed or grooming yourself? No   Do you have difficulty using the toilet? No   Do you have difficulty moving around from place to place? No   Do you have trouble with steps or getting out of a bed or a chair? Yes   Current Diet Well Balanced Diet   Dental Exam Not up to date   Eye Exam Up to date   Exercise (times per week) 0 times per week   Current Exercises Include No Regular Exercise;House Cleaning   Do you need help using the phone?  No   Are you deaf or do you have serious difficulty hearing?  No   Do you need help to go to places out of walking distance? No   Do you need help shopping? No   Do you need help preparing meals?  No   Do you need help with housework?  No   Do you need help with laundry? No   Do you need help taking your medications? No   Do you need help managing money? No   Do you ever drive or ride in a car without wearing a seat belt?  No   Have you felt unusual stress, anger or loneliness in the last month? No   Who do you live with? Spouse   If you need help, do you have trouble finding someone available to you? No   Have you been bothered in the last four weeks by sexual problems? No   Do you have difficulty concentrating, remembering or making decisions? No       Age-appropriate Screening Schedule:  Refer to the list below for future screening recommendations based on patient's age, sex and/or medical conditions. Orders for these recommended tests are listed in the plan section. The patient has been provided with a written plan.    Health Maintenance   Topic Date Due    URINE MICROALBUMIN  Never done    RSV Vaccine - Adults (1 - 1-dose 60+ series) Never done    DIABETIC FOOT EXAM  Never done    DIABETIC EYE EXAM  01/18/2024    COVID-19 Vaccine (7 - 2023-24 season) 04/15/2024    COLORECTAL CANCER SCREENING  10/14/2024    DXA SCAN  05/22/2024 (Originally 1950)    LUNG CANCER SCREENING  05/22/2025 (Originally 7/25/2002)    INFLUENZA VACCINE  08/01/2024    HEMOGLOBIN A1C  11/15/2024    LIPID PANEL  05/15/2025    ANNUAL WELLNESS VISIT  05/22/2025    BMI FOLLOWUP  05/22/2025    MAMMOGRAM  11/07/2025    TDAP/TD VACCINES (2 - Td or Tdap) 06/02/2031    HEPATITIS C SCREENING  Completed    Pneumococcal Vaccine 65+  Completed    ZOSTER VACCINE  Completed              Assessment & Plan   CMS Preventative Services Quick Reference  Risk Factors Identified During Encounter  None Identified  The above risks/problems have been discussed with the patient.  Follow up actions/plans if indicated are seen below in the Assessment/Plan Section.  Pertinent information has been shared with the patient in the After Visit Summary.    Diagnoses and all orders for this visit:    1. Medicare annual wellness visit, subsequent (Primary)    2. Type 2 diabetes mellitus without complication, without long-term current use of insulin  -     Cancel: Microalbumin / Creatinine  Urine Ratio - Urine, Clean Catch  -     metFORMIN ER (GLUCOPHAGE-XR) 500 MG 24 hr tablet; Take 1 tablet by mouth Daily With Breakfast.  Dispense: 90 tablet; Refill: 3  -     POCT microalbumin    3. Essential hypertension  -     Cancel: Microalbumin / Creatinine Urine Ratio - Urine, Clean Catch  -     lisinopril (PRINIVIL,ZESTRIL) 20 MG tablet; Take 1 tablet by mouth Daily.  Dispense: 90 tablet; Refill: 3    4. Mixed hyperlipidemia    5. Bronchitis  -     azithromycin (Zithromax Z-Bill) 250 MG tablet; Take 2 tablets by mouth on day 1, then 1 tablet daily on days 2-5  Dispense: 6 tablet; Refill: 0        Downward trend of A1c and cholesterol.  Discussed BP today and she knows it is running much better at home.  Running 140/70s at home.     Think about MVI with vitamin D and calcium.    Wants to wait till the fall to discuss colonoscopy.  Declines DEXA, lung cancer screening today.     Follow Up:   Return in about 6 months (around 11/22/2024) for f/u HTN, T2DM.     An After Visit Summary and PPPS were made available to the patient.

## 2024-07-10 ENCOUNTER — OFFICE VISIT (OUTPATIENT)
Dept: INTERNAL MEDICINE | Facility: CLINIC | Age: 74
End: 2024-07-10
Payer: MEDICARE

## 2024-07-10 VITALS
DIASTOLIC BLOOD PRESSURE: 88 MMHG | HEIGHT: 62 IN | WEIGHT: 176.8 LBS | OXYGEN SATURATION: 96 % | BODY MASS INDEX: 32.54 KG/M2 | SYSTOLIC BLOOD PRESSURE: 132 MMHG | TEMPERATURE: 97.3 F | HEART RATE: 63 BPM

## 2024-07-10 DIAGNOSIS — E11.9 TYPE 2 DIABETES MELLITUS WITHOUT COMPLICATION, WITHOUT LONG-TERM CURRENT USE OF INSULIN: ICD-10-CM

## 2024-07-10 DIAGNOSIS — M70.72 BURSITIS OF LEFT HIP, UNSPECIFIED BURSA: Primary | ICD-10-CM

## 2024-07-10 PROCEDURE — 1159F MED LIST DOCD IN RCRD: CPT | Performed by: INTERNAL MEDICINE

## 2024-07-10 PROCEDURE — 99214 OFFICE O/P EST MOD 30 MIN: CPT | Performed by: INTERNAL MEDICINE

## 2024-07-10 PROCEDURE — 3079F DIAST BP 80-89 MM HG: CPT | Performed by: INTERNAL MEDICINE

## 2024-07-10 PROCEDURE — 3044F HG A1C LEVEL LT 7.0%: CPT | Performed by: INTERNAL MEDICINE

## 2024-07-10 PROCEDURE — 3075F SYST BP GE 130 - 139MM HG: CPT | Performed by: INTERNAL MEDICINE

## 2024-07-10 PROCEDURE — 1160F RVW MEDS BY RX/DR IN RCRD: CPT | Performed by: INTERNAL MEDICINE

## 2024-07-10 NOTE — PROGRESS NOTES
"Joan Block is a 73 y.o. female who presents with a chief complaint of   Chief Complaint   Patient presents with    Abdominal Pain     Stomach pain, dull ache.It is gone now.       HPI     For the past 3 weeks, had significant abdominal pain.  Taking 1000 mg of Tylenol.     The following portions of the patient's history were reviewed and updated as appropriate: allergies, current medications, past family history, past medical history, past social history, past surgical history and problem list.      Current Outpatient Medications:     diclofenac (VOLTAREN) 75 MG EC tablet, Take 1 tablet by mouth As Needed (pain). Pt reports taking 2-3 times weekly, Disp: 30 tablet, Rfl: 1    lisinopril (PRINIVIL,ZESTRIL) 20 MG tablet, Take 1 tablet by mouth Daily., Disp: 90 tablet, Rfl: 3            Physical Exam  /88 (BP Location: Left arm, Patient Position: Sitting, Cuff Size: Adult)   Pulse 63   Temp 97.3 °F (36.3 °C) (Infrared)   Ht 157.5 cm (62\")   Wt 80.2 kg (176 lb 12.8 oz)   SpO2 96%   BMI 32.34 kg/m²     Physical Exam  Vitals reviewed.   Constitutional:       General: She is not in acute distress.     Appearance: Normal appearance.   HENT:      Head: Normocephalic and atraumatic.      Nose: Nose normal.      Mouth/Throat:      Mouth: Mucous membranes are moist.   Eyes:      Conjunctiva/sclera: Conjunctivae normal.   Pulmonary:      Effort: Pulmonary effort is normal.   Skin:     General: Skin is warm and dry.   Neurological:      General: No focal deficit present.      Mental Status: She is alert.   Psychiatric:         Mood and Affect: Mood normal.           Results for orders placed or performed in visit on 05/22/24   POCT microalbumin    Specimen: Urine   Result Value Ref Range    Microalbumin, Urine 30 mg/L     Creatinine, Urine 200 mg/dL     Lot Number [id number]     Expiration Date 2/28/25            Diagnoses and all orders for this visit:    1. Bursitis of left hip, unspecified bursa " (Primary)    2. Type 2 diabetes mellitus without complication, without long-term current use of insulin      Try Tylenol and Diclofenac.  Continue taking Metformin at night.      Stop metformin. Check A1c in November.  Check sugars if symptoms re-occur.

## 2024-10-08 ENCOUNTER — OFFICE VISIT (OUTPATIENT)
Dept: INTERNAL MEDICINE | Facility: CLINIC | Age: 74
End: 2024-10-08
Payer: MEDICARE

## 2024-10-08 ENCOUNTER — OFFICE VISIT (OUTPATIENT)
Dept: ORTHOPEDIC SURGERY | Facility: CLINIC | Age: 74
End: 2024-10-08
Payer: MEDICARE

## 2024-10-08 VITALS
HEART RATE: 80 BPM | SYSTOLIC BLOOD PRESSURE: 174 MMHG | HEIGHT: 62 IN | BODY MASS INDEX: 33.49 KG/M2 | WEIGHT: 182 LBS | DIASTOLIC BLOOD PRESSURE: 93 MMHG

## 2024-10-08 VITALS
WEIGHT: 182.6 LBS | OXYGEN SATURATION: 95 % | DIASTOLIC BLOOD PRESSURE: 82 MMHG | SYSTOLIC BLOOD PRESSURE: 134 MMHG | HEIGHT: 62 IN | HEART RATE: 67 BPM | BODY MASS INDEX: 33.6 KG/M2 | TEMPERATURE: 98 F

## 2024-10-08 DIAGNOSIS — M70.62 TROCHANTERIC BURSITIS OF LEFT HIP: ICD-10-CM

## 2024-10-08 DIAGNOSIS — I10 ESSENTIAL HYPERTENSION: Chronic | ICD-10-CM

## 2024-10-08 DIAGNOSIS — E78.2 MIXED HYPERLIPIDEMIA: Chronic | ICD-10-CM

## 2024-10-08 DIAGNOSIS — E11.9 TYPE 2 DIABETES MELLITUS WITHOUT COMPLICATION, WITHOUT LONG-TERM CURRENT USE OF INSULIN: Primary | ICD-10-CM

## 2024-10-08 DIAGNOSIS — M54.32 SCIATICA OF LEFT SIDE: Chronic | ICD-10-CM

## 2024-10-08 PROCEDURE — 3075F SYST BP GE 130 - 139MM HG: CPT | Performed by: NURSE PRACTITIONER

## 2024-10-08 PROCEDURE — 99214 OFFICE O/P EST MOD 30 MIN: CPT | Performed by: NURSE PRACTITIONER

## 2024-10-08 PROCEDURE — G2211 COMPLEX E/M VISIT ADD ON: HCPCS | Performed by: NURSE PRACTITIONER

## 2024-10-08 PROCEDURE — 73502 X-RAY EXAM HIP UNI 2-3 VIEWS: CPT | Performed by: ORTHOPAEDIC SURGERY

## 2024-10-08 PROCEDURE — 3077F SYST BP >= 140 MM HG: CPT | Performed by: ORTHOPAEDIC SURGERY

## 2024-10-08 PROCEDURE — 3044F HG A1C LEVEL LT 7.0%: CPT | Performed by: NURSE PRACTITIONER

## 2024-10-08 PROCEDURE — 3079F DIAST BP 80-89 MM HG: CPT | Performed by: NURSE PRACTITIONER

## 2024-10-08 PROCEDURE — 20610 DRAIN/INJ JOINT/BURSA W/O US: CPT | Performed by: ORTHOPAEDIC SURGERY

## 2024-10-08 PROCEDURE — 1159F MED LIST DOCD IN RCRD: CPT | Performed by: ORTHOPAEDIC SURGERY

## 2024-10-08 PROCEDURE — 99214 OFFICE O/P EST MOD 30 MIN: CPT | Performed by: ORTHOPAEDIC SURGERY

## 2024-10-08 PROCEDURE — 1160F RVW MEDS BY RX/DR IN RCRD: CPT | Performed by: ORTHOPAEDIC SURGERY

## 2024-10-08 PROCEDURE — 3080F DIAST BP >= 90 MM HG: CPT | Performed by: ORTHOPAEDIC SURGERY

## 2024-10-08 RX ORDER — LISINOPRIL 20 MG/1
20 TABLET ORAL DAILY
Qty: 90 TABLET | Refills: 0 | Status: SHIPPED | OUTPATIENT
Start: 2024-10-08

## 2024-10-08 RX ORDER — LIDOCAINE HYDROCHLORIDE 10 MG/ML
4 INJECTION, SOLUTION EPIDURAL; INFILTRATION; INTRACAUDAL; PERINEURAL
Status: COMPLETED | OUTPATIENT
Start: 2024-10-08 | End: 2024-10-08

## 2024-10-08 RX ORDER — DICLOFENAC SODIUM 75 MG/1
TABLET, DELAYED RELEASE ORAL
Qty: 30 TABLET | Refills: 0 | OUTPATIENT
Start: 2024-10-08

## 2024-10-08 RX ORDER — DICLOFENAC SODIUM 75 MG/1
75 TABLET, DELAYED RELEASE ORAL AS NEEDED
Qty: 30 TABLET | Refills: 1 | Status: SHIPPED | OUTPATIENT
Start: 2024-10-08

## 2024-10-08 RX ORDER — TRIAMCINOLONE ACETONIDE 40 MG/ML
80 INJECTION, SUSPENSION INTRA-ARTICULAR; INTRAMUSCULAR
Status: COMPLETED | OUTPATIENT
Start: 2024-10-08 | End: 2024-10-08

## 2024-10-08 RX ADMIN — TRIAMCINOLONE ACETONIDE 80 MG: 40 INJECTION, SUSPENSION INTRA-ARTICULAR; INTRAMUSCULAR at 13:24

## 2024-10-08 RX ADMIN — LIDOCAINE HYDROCHLORIDE 4 ML: 10 INJECTION, SOLUTION EPIDURAL; INFILTRATION; INTRACAUDAL; PERINEURAL at 13:24

## 2024-10-08 NOTE — PROGRESS NOTES
Subjective: Left trochanteric bursitis     Patient ID: Joan Block is a 74 y.o. female.    Chief Complaint:    History of Present Illness 74-year-old female known to me of not been seen for over 2 years presents with recurrence of the left trochanteric bursitis.  The pain is laterally and in the buttocks.  No new history of injury.  Dates the onset of all of this to when she fell 3 years ago and broke her wrist which required surgery.       Social History     Occupational History    Not on file   Tobacco Use    Smoking status: Former     Current packs/day: 0.00     Average packs/day: 0.5 packs/day for 45.0 years (22.5 ttl pk-yrs)     Types: Cigarettes     Start date: 1970     Quit date: 3/2/2015     Years since quittin.6     Passive exposure: Never    Smokeless tobacco: Never   Vaping Use    Vaping status: Never Used   Substance and Sexual Activity    Alcohol use: No    Drug use: No    Sexual activity: Not Currently     Partners: Male     Birth control/protection: Post-menopausal      Review of Systems      Past Medical History:   Diagnosis Date    Arthritis of back 8 yrs ago    Bursitis of hip 2021    Diabetes mellitus     Fracture of wrist 2021    Fracture, radius 2021    Fracture, ulna 2021    Hyperlipidemia     Hypertension     Neuromuscular disorder     Osteopenia 2020    Tendinitis of knee      Past Surgical History:   Procedure Laterality Date    APPENDECTOMY      FOOT SURGERY      FRACTURE SURGERY      KNEE SURGERY      ORIF ULNA/RADIUS FRACTURES Left 06/15/2021    Procedure: ULNA/RADIUS OPEN REDUCTION INTERNAL FIXATION;  Surgeon: Norman Brown MD;  Location: [facility];  Service: Orthopedics;  Laterality: Left;  ULNA/RADIAL OPEN REDUCTION INTERNAL FIXATION    TONSILLECTOMY  1960    WRIST SURGERY       Family History   Problem Relation Age of Onset    Cancer Mother     Breast cancer Mother     Hyperlipidemia Mother         Both parents    Diabetes  Father          Objective:  Vitals:    10/08/24 1303   BP: 174/93   Pulse: 80         10/08/24  1303   Weight: 82.6 kg (182 lb)     Body mass index is 33.29 kg/m².           Ortho Exam   AP and lateral of the left hip is unremarkable for any acute or chronic changes.  She is alert and oriented x 3.  Head is normocephalic and sclerae clear.  She has no pain with passive internal/external Tatian of the leg and has a negative Stinchfield test and straight leg raise.  Some tenderness in the buttock area but more most pronounced tenderness over the left greater trochanteric and there is marked pain with abduction against resistance with a positive Gregor's test.  It is cool to touch.  No motor or sensory deficit.  There is no ecchymosis or bruising over the trigger point.  She is taking Voltaren with no relief of her symptoms.    Assessment:        1. Sciatica of left side    2. Trochanteric bursitis of left hip           Plan: Reviewed with the patient her history x-ray and physical exam.  She has reoccurrence of the trochanteric bursitis.  After reviewing treatment options as she is already taking the diclofenac regardlessly with injection of the left greater trochanter bursa with 4 cc of lidocaine and 2 cc of Kenalog which is accomplished after sterile prepping.  Return in 4 weeks if still symptomatic.  Answered all questions      Large Joint Arthrocentesis: L greater trochanteric bursa  Date/Time: 10/8/2024 1:24 PM  Consent given by: patient  Site marked: site marked  Timeout: Immediately prior to procedure a time out was called to verify the correct patient, procedure, equipment, support staff and site/side marked as required   Supporting Documentation  Indications: pain   Procedure Details  Location: hip - L greater trochanteric bursa  Preparation: Patient was prepped and draped in the usual sterile fashion  Needle size: 22 G  Approach: lateral  Medications administered: 4 mL lidocaine PF 1% 1 %; 80 mg triamcinolone  acetonide 40 MG/ML  Patient tolerance: patient tolerated the procedure well with no immediate complications            Work Status:    GIANA query complete.    Orders:  Orders Placed This Encounter   Procedures    Large Joint Arthrocentesis: L greater trochanteric bursa    XR Hip With or Without Pelvis 2 - 3 View Left       Medications:  No orders of the defined types were placed in this encounter.      Followup:  Return in about 4 weeks (around 11/5/2024).          Dictated utilizing Dragon dictation   Answers submitted by the patient for this visit:  Other (Submitted on 10/3/2024)  Please describe your symptoms.: Bursitis of left hip  Have you had these symptoms before?: Yes  How long have you been having these symptoms?: Greater than 2 weeks  Please list any medications you are currently taking for this condition.: Diclofenac- 75 mg  Please describe any probable cause for these symptoms. : Falling on left hip in june 2021  Primary Reason for Visit (Submitted on 10/3/2024)  What is the primary reason for your visit?: Problem Not Listed

## 2024-10-08 NOTE — PROGRESS NOTES
Chief Complaint   Patient presents with    Diabetes     F/u on diabetes management. Has been off of the Metformin since June. She is not fating.       Subjective     Joan Block is a 74 y.o. female being seen for a follow up appointment today regarding  DM 2, HTN and Hyperlipidemia. She is a patient of Dr. Jenkins who I am seeing for the first time today.     She is here to follow up on Diabetes. She was placed on Metformin XR 500mg daily in January due to prediabetes. She has symptoms of dry mouth, vaginal itching and urinary frequency. She stopped this in April due to GI intolerance of abd pain, described as constant gnawing. She tried  taking it with food, but no better.     She is having lisinopril 20mg daily. She does not check her BP at home, Denies CP, SOA, edema.     She has hyperlipidemia, and is statin intolerant.     History of Present Illness     No Known Allergies      Current Outpatient Medications:     diclofenac (VOLTAREN) 75 MG EC tablet, Take 1 tablet by mouth As Needed (pain). Pt reports taking 2-3 times weekly, Disp: 30 tablet, Rfl: 1    lisinopril (PRINIVIL,ZESTRIL) 20 MG tablet, Take 1 tablet by mouth Daily., Disp: 90 tablet, Rfl: 3    The following portions of the patient's history were reviewed and updated as appropriate: allergies, current medications, past family history, past medical history, past social history, past surgical history, and problem list.    Review of Systems   HENT: Negative.     Respiratory: Negative.     Endocrine: Positive for polydipsia and polyuria.   Genitourinary:  Negative for vaginal discharge. Vaginal pain: itching.  Neurological: Negative.  Negative for tremors, speech difficulty and headaches.   Hematological: Negative.  Negative for adenopathy. Does not bruise/bleed easily.   Psychiatric/Behavioral:  Negative for confusion.        Assessment     Physical Exam  Vitals reviewed.   Constitutional:       Appearance: Normal appearance. She is obese. She is not  ill-appearing.   Cardiovascular:      Rate and Rhythm: Normal rate and regular rhythm.      Pulses: Normal pulses.      Heart sounds: Normal heart sounds. No murmur heard.  Musculoskeletal:      Right lower leg: No edema.      Left lower leg: No edema.   Skin:     General: Skin is warm and dry.   Neurological:      General: No focal deficit present.      Mental Status: She is alert and oriented to person, place, and time.      Gait: Gait normal.   Psychiatric:         Mood and Affect: Mood normal.         Behavior: Behavior normal.         Thought Content: Thought content normal.         Plan     Her fasting labs were reviewed with the patient from 4 months ago    Diagnoses and all orders for this visit:    1. Type 2 diabetes mellitus without complication, without long-term current use of insulin (Primary)  Comments:  Chronic, Stop metformin due to intolerance. Start Mounjaro 2.5mg weekly, demo on pen use  Orders:  -     CBC & Differential  -     Comprehensive Metabolic Panel  -     Hemoglobin A1c  -     Tirzepatide (MOUNJARO) 2.5 MG/0.5ML solution pen-injector pen; Inject 0.5 mL under the skin into the appropriate area as directed 1 (One) Time Per Week.  Dispense: 2 mL; Refill: 0    2. Essential hypertension  Comments:  chronic, BP at goal on lisinopril 20mg daily  Orders:  -     lisinopril (PRINIVIL,ZESTRIL) 20 MG tablet; Take 1 tablet by mouth Daily.  Dispense: 90 tablet; Refill: 0    3. Mixed hyperlipidemia  Comments:  Chronic, not on statin therapy.    4. Trochanteric bursitis of left hip  Comments:  Refill diclofenac, seeing ortho today  Orders:  -     diclofenac (VOLTAREN) 75 MG EC tablet; Take 1 tablet by mouth As Needed (pain). Pt reports taking 2-3 times weekly  Dispense: 30 tablet; Refill: 1    Due to symptoms, will proceed with Mounjaro 2.5mg weekly. She is not checking her glucose, concern for hypoglycemia on other oral medications.     Follow up in 4 weeks

## 2024-10-09 LAB
ALBUMIN SERPL-MCNC: 4.2 G/DL (ref 3.5–5.2)
ALBUMIN/GLOB SERPL: 1.6 G/DL
ALP SERPL-CCNC: 66 U/L (ref 39–117)
ALT SERPL-CCNC: 15 U/L (ref 1–33)
AST SERPL-CCNC: 15 U/L (ref 1–32)
BASOPHILS # BLD AUTO: 0.06 10*3/MM3 (ref 0–0.2)
BASOPHILS NFR BLD AUTO: 0.8 % (ref 0–1.5)
BILIRUB SERPL-MCNC: 0.3 MG/DL (ref 0–1.2)
BUN SERPL-MCNC: 21 MG/DL (ref 8–23)
BUN/CREAT SERPL: 19.6 (ref 7–25)
CALCIUM SERPL-MCNC: 9.9 MG/DL (ref 8.6–10.5)
CHLORIDE SERPL-SCNC: 106 MMOL/L (ref 98–107)
CO2 SERPL-SCNC: 24.4 MMOL/L (ref 22–29)
CREAT SERPL-MCNC: 1.07 MG/DL (ref 0.57–1)
EGFRCR SERPLBLD CKD-EPI 2021: 55.3 ML/MIN/1.73
EOSINOPHIL # BLD AUTO: 0.14 10*3/MM3 (ref 0–0.4)
EOSINOPHIL NFR BLD AUTO: 1.8 % (ref 0.3–6.2)
ERYTHROCYTE [DISTWIDTH] IN BLOOD BY AUTOMATED COUNT: 13.3 % (ref 12.3–15.4)
GLOBULIN SER CALC-MCNC: 2.7 GM/DL
GLUCOSE SERPL-MCNC: 95 MG/DL (ref 65–99)
HBA1C MFR BLD: 6.1 % (ref 4.8–5.6)
HCT VFR BLD AUTO: 39.8 % (ref 34–46.6)
HGB BLD-MCNC: 12.9 G/DL (ref 12–15.9)
IMM GRANULOCYTES # BLD AUTO: 0.04 10*3/MM3 (ref 0–0.05)
IMM GRANULOCYTES NFR BLD AUTO: 0.5 % (ref 0–0.5)
LYMPHOCYTES # BLD AUTO: 1.84 10*3/MM3 (ref 0.7–3.1)
LYMPHOCYTES NFR BLD AUTO: 24.3 % (ref 19.6–45.3)
MCH RBC QN AUTO: 28.4 PG (ref 26.6–33)
MCHC RBC AUTO-ENTMCNC: 32.4 G/DL (ref 31.5–35.7)
MCV RBC AUTO: 87.7 FL (ref 79–97)
MONOCYTES # BLD AUTO: 0.49 10*3/MM3 (ref 0.1–0.9)
MONOCYTES NFR BLD AUTO: 6.5 % (ref 5–12)
NEUTROPHILS # BLD AUTO: 5 10*3/MM3 (ref 1.7–7)
NEUTROPHILS NFR BLD AUTO: 66.1 % (ref 42.7–76)
NRBC BLD AUTO-RTO: 0 /100 WBC (ref 0–0.2)
PLATELET # BLD AUTO: 270 10*3/MM3 (ref 140–450)
POTASSIUM SERPL-SCNC: 4.7 MMOL/L (ref 3.5–5.2)
PROT SERPL-MCNC: 6.9 G/DL (ref 6–8.5)
RBC # BLD AUTO: 4.54 10*6/MM3 (ref 3.77–5.28)
SODIUM SERPL-SCNC: 142 MMOL/L (ref 136–145)
WBC # BLD AUTO: 7.57 10*3/MM3 (ref 3.4–10.8)

## 2024-11-04 ENCOUNTER — TRANSCRIBE ORDERS (OUTPATIENT)
Dept: ADMINISTRATIVE | Facility: HOSPITAL | Age: 74
End: 2024-11-04
Payer: MEDICARE

## 2024-11-04 DIAGNOSIS — Z12.31 BREAST CANCER SCREENING BY MAMMOGRAM: Primary | ICD-10-CM

## 2024-11-05 ENCOUNTER — OFFICE VISIT (OUTPATIENT)
Dept: ORTHOPEDIC SURGERY | Facility: CLINIC | Age: 74
End: 2024-11-05
Payer: MEDICARE

## 2024-11-05 VITALS — WEIGHT: 182 LBS | HEIGHT: 62 IN | BODY MASS INDEX: 33.49 KG/M2

## 2024-11-05 DIAGNOSIS — M41.26 OTHER IDIOPATHIC SCOLIOSIS, LUMBAR REGION: ICD-10-CM

## 2024-11-05 DIAGNOSIS — M70.62 TROCHANTERIC BURSITIS OF LEFT HIP: Primary | ICD-10-CM

## 2024-11-05 DIAGNOSIS — M47.817 DJD (DEGENERATIVE JOINT DISEASE), LUMBOSACRAL: ICD-10-CM

## 2024-11-05 PROCEDURE — 99213 OFFICE O/P EST LOW 20 MIN: CPT | Performed by: ORTHOPAEDIC SURGERY

## 2024-11-05 RX ORDER — CYCLOBENZAPRINE HCL 5 MG
5 TABLET ORAL 3 TIMES DAILY PRN
COMMUNITY
End: 2024-11-06 | Stop reason: SDUPTHER

## 2024-11-05 NOTE — PROGRESS NOTES
Subjective: Left hip and leg pain     Patient ID: Joan Block is a 74 y.o. female.    Chief Complaint:    History of Present Illness 74-year-old female returns noting no improvement at all in her leg pain on that left side following the injection.  Still having significant discomfort states she is even getting some radiation down the front of the leg past the knee into the ankle.       Social History     Occupational History    Not on file   Tobacco Use    Smoking status: Former     Current packs/day: 0.00     Average packs/day: 0.7 packs/day for 67.5 years (45.0 ttl pk-yrs)     Types: Cigarettes     Start date: 1970     Quit date: 3/2/2015     Years since quittin.6     Passive exposure: Never    Smokeless tobacco: Never   Vaping Use    Vaping status: Never Used   Substance and Sexual Activity    Alcohol use: No    Drug use: No    Sexual activity: Not Currently     Partners: Male     Birth control/protection: Post-menopausal      Review of Systems      Past Medical History:   Diagnosis Date    Arthritis of back 8 yrs ago    Bursitis of hip 2021    Diabetes mellitus     Fracture of wrist 2021    Fracture, radius 2021    Fracture, ulna 2021    Hyperlipidemia     Hypertension     Neuromuscular disorder     Osteopenia 2020    Tendinitis of knee 2019     Past Surgical History:   Procedure Laterality Date    APPENDECTOMY      FOOT SURGERY      FRACTURE SURGERY      KNEE SURGERY      ORIF ULNA/RADIUS FRACTURES Left 06/15/2021    Procedure: ULNA/RADIUS OPEN REDUCTION INTERNAL FIXATION;  Surgeon: Norman Brown MD;  Location: [facility];  Service: Orthopedics;  Laterality: Left;  ULNA/RADIAL OPEN REDUCTION INTERNAL FIXATION    TONSILLECTOMY  1960    WRIST SURGERY       Family History   Problem Relation Age of Onset    Cancer Mother     Breast cancer Mother     Hyperlipidemia Mother         Both parents    Diabetes Father          Objective:  There were no vitals filed for  this visit.      11/05/24  1303   Weight: 82.6 kg (182 lb)     Body mass index is 33.29 kg/m².           Ortho Exam   AP and lateral x-ray of the lumbar spine taken today shows significant lumbar scoliosis with curvature apex at L2-L3 with loss of disc space.  She is alert and oriented x 3.  She does have a positive straight leg raise on that left side.  No pain with passive internal/external rotation of the leg.  Minimal tenderness over the greater trochanter.  Negative Stinchfield test.  She has good distal pulse of calf nontender.    Assessment:        1. Trochanteric bursitis of left hip    2. DJD (degenerative joint disease), lumbosacral    3. Other idiopathic scoliosis, lumbar region           Plan: Reviewed the x-rays with the patient which shows a significant scoliosis of the lumbar spine.  I want to proceed with an MRI to fully evaluate the lumbar spine before making further recommendations.  Patient was in agreement.            Work Status:    GIANA query complete.    Orders:  Orders Placed This Encounter   Procedures    XR Spine Lumbar AP & Lateral    MRI Lumbar Spine Without Contrast       Medications:  No orders of the defined types were placed in this encounter.      Followup:  Return in about 4 weeks (around 12/3/2024).          Dictated utilizing Dragon dictation

## 2024-11-06 ENCOUNTER — TELEPHONE (OUTPATIENT)
Dept: INTERNAL MEDICINE | Facility: CLINIC | Age: 74
End: 2024-11-06
Payer: MEDICARE

## 2024-11-06 RX ORDER — CYCLOBENZAPRINE HCL 5 MG
5 TABLET ORAL 3 TIMES DAILY PRN
Status: CANCELLED | OUTPATIENT
Start: 2024-11-06

## 2024-11-06 RX ORDER — CYCLOBENZAPRINE HCL 5 MG
5 TABLET ORAL 3 TIMES DAILY PRN
Qty: 90 TABLET | Refills: 1 | Status: SHIPPED | OUTPATIENT
Start: 2024-11-06

## 2024-11-06 NOTE — TELEPHONE ENCOUNTER
Patient called inquiring about a medication not being sent into the pharmacy. I did not see anything in your note about it.     Rx Refill Note  Requested Prescriptions     Pending Prescriptions Disp Refills    cyclobenzaprine (FLEXERIL) 5 MG tablet       Sig: Take 1 tablet by mouth 3 (Three) Times a Day As Needed for Muscle Spasms.      Last office visit with prescribing clinician: 11/5/2024  Next office visit with prescribing clinician: Visit date not found   Last Filled:     Trochanteric bursitis of left hip +2 more       Alana Deras MA  11/06/24, 09:34 EST    Previous RX pended for your approval, change or denial.     {TIP  Encounters:    {TIP  Please add Last Relevant Lab Date if appropriate:  {TIP  Is Refill Pharmacy correct?:

## 2024-11-11 ENCOUNTER — HOSPITAL ENCOUNTER (OUTPATIENT)
Dept: MRI IMAGING | Facility: HOSPITAL | Age: 74
Discharge: HOME | End: 2024-11-11
Admitting: ORTHOPAEDIC SURGERY
Payer: MEDICARE

## 2024-11-11 DIAGNOSIS — M41.26 OTHER IDIOPATHIC SCOLIOSIS, LUMBAR REGION: ICD-10-CM

## 2024-11-11 DIAGNOSIS — M47.817 DJD (DEGENERATIVE JOINT DISEASE), LUMBOSACRAL: ICD-10-CM

## 2024-11-11 PROCEDURE — 72148 MRI LUMBAR SPINE W/O DYE: CPT

## 2024-11-14 ENCOUNTER — OFFICE VISIT (OUTPATIENT)
Dept: ORTHOPEDIC SURGERY | Facility: CLINIC | Age: 74
End: 2024-11-14
Payer: MEDICARE

## 2024-11-14 VITALS — BODY MASS INDEX: 33.49 KG/M2 | HEIGHT: 62 IN | WEIGHT: 182 LBS

## 2024-11-14 DIAGNOSIS — M41.26 OTHER IDIOPATHIC SCOLIOSIS, LUMBAR REGION: ICD-10-CM

## 2024-11-14 DIAGNOSIS — M47.817 DJD (DEGENERATIVE JOINT DISEASE), LUMBOSACRAL: Primary | ICD-10-CM

## 2024-11-14 DIAGNOSIS — M48.061 FORAMINAL STENOSIS OF LUMBAR REGION: ICD-10-CM

## 2024-11-14 PROCEDURE — 1160F RVW MEDS BY RX/DR IN RCRD: CPT | Performed by: ORTHOPAEDIC SURGERY

## 2024-11-14 PROCEDURE — 99214 OFFICE O/P EST MOD 30 MIN: CPT | Performed by: ORTHOPAEDIC SURGERY

## 2024-11-14 PROCEDURE — 1159F MED LIST DOCD IN RCRD: CPT | Performed by: ORTHOPAEDIC SURGERY

## 2024-11-14 NOTE — PROGRESS NOTES
Subjective: Radiculopathy lumbar spine     Patient ID: Joan Block is a 74 y.o. female.    Chief Complaint:    History of Present Illness 74-year-old female returns to review the results of the MRI whose images and report are personally reviewed.  It shows multilevel disc degenerative changes in particular at L4/5 there is nerve entrapment.  She is having right radiculopathy.       Social History     Occupational History    Not on file   Tobacco Use    Smoking status: Former     Current packs/day: 0.00     Average packs/day: 0.7 packs/day for 67.5 years (45.0 ttl pk-yrs)     Types: Cigarettes     Start date: 1970     Quit date: 3/2/2015     Years since quittin.7     Passive exposure: Never    Smokeless tobacco: Never   Vaping Use    Vaping status: Never Used   Substance and Sexual Activity    Alcohol use: No    Drug use: No    Sexual activity: Not Currently     Partners: Male     Birth control/protection: Post-menopausal      Review of Systems      Past Medical History:   Diagnosis Date    Arthritis of back 8 yrs ago    Bursitis of hip 2021    Diabetes mellitus     Fracture of wrist 2021    Fracture, radius 2021    Fracture, ulna 2021    Hyperlipidemia     Hypertension     Neuromuscular disorder     Osteopenia 2020    Tendinitis of knee      Past Surgical History:   Procedure Laterality Date    APPENDECTOMY      FOOT SURGERY      FRACTURE SURGERY      KNEE SURGERY      ORIF ULNA/RADIUS FRACTURES Left 06/15/2021    Procedure: ULNA/RADIUS OPEN REDUCTION INTERNAL FIXATION;  Surgeon: Norman Brown MD;  Location: [facility];  Service: Orthopedics;  Laterality: Left;  ULNA/RADIAL OPEN REDUCTION INTERNAL FIXATION    TONSILLECTOMY  1960    WRIST SURGERY       Family History   Problem Relation Age of Onset    Cancer Mother     Breast cancer Mother     Hyperlipidemia Mother         Both parents    Diabetes Father          Objective:  There were no vitals filed for this  visit.      11/14/24  1045   Weight: 82.6 kg (182 lb)     Body mass index is 33.29 kg/m².           Ortho Exam   she is alert and oriented x 3.  I reviewed the images and the report with the patient shows the nerve entrapment at L in the right side at L4-5.  She is having right radiculopathy that is failed respond to management today.  She is good distal pulses.  Calf is nontender.    Assessment:    MRI Lumbar Spine Without Contrast    Result Date: 11/11/2024  Impression: 1. Multilevel degenerative disc disease and degenerative facet change resulting in multilevel neural foraminal narrowing and lateral recess stenosis as detailed above. Electronically Signed: Wesley Hardy MD  11/11/2024 12:20 PM EST  Workstation ID: [id number]              1. DJD (degenerative joint disease), lumbosacral    2. Other idiopathic scoliosis, lumbar region    3. Foraminal stenosis of lumbar region           Plan: Again reviewed the report and the images with the patient.  She has nerve impingement on that right side somata schedule her for lumbar epidural blocks.  Return in 2 months to  the efficacy of those blocks.  Answered all questions            Work Status:    GIANA query complete.    Orders:  Orders Placed This Encounter   Procedures    Ambulatory Referral to Hospital Pain Management Department       Medications:  No orders of the defined types were placed in this encounter.      Followup:  Return in about 2 months (around 1/14/2025).          Dictated utilizing Dragon dictation

## 2024-11-20 ENCOUNTER — OFFICE VISIT (OUTPATIENT)
Dept: INTERNAL MEDICINE | Facility: CLINIC | Age: 74
End: 2024-11-20
Payer: MEDICARE

## 2024-11-20 VITALS
DIASTOLIC BLOOD PRESSURE: 88 MMHG | TEMPERATURE: 98.4 F | WEIGHT: 175.6 LBS | BODY MASS INDEX: 32.31 KG/M2 | OXYGEN SATURATION: 96 % | SYSTOLIC BLOOD PRESSURE: 132 MMHG | HEIGHT: 62 IN | HEART RATE: 94 BPM

## 2024-11-20 DIAGNOSIS — E11.9 TYPE 2 DIABETES MELLITUS WITHOUT COMPLICATION, WITHOUT LONG-TERM CURRENT USE OF INSULIN: Primary | ICD-10-CM

## 2024-11-20 DIAGNOSIS — D35.00 ADRENAL ADENOMA, UNSPECIFIED LATERALITY: ICD-10-CM

## 2024-11-20 NOTE — PROGRESS NOTES
"      Joan Block is a 74 y.o. female who presents with a chief complaint of   Chief Complaint   Patient presents with    Hypertension       HPI     Answers submitted by the patient for this visit:  Other (Submitted on 11/13/2024)  Please describe your symptoms.: Medicare check up  Have you had these symptoms before?: No  How long have you been having these symptoms?: Greater than 2 weeks  Please list any medications you are currently taking for this condition.: Muscle relaxers and tylenol and lisinipril  Primary Reason for Visit (Submitted on 11/13/2024)  What is the primary reason for your visit?: Problem Not Listed      The following portions of the patient's history were reviewed and updated as appropriate: allergies, current medications, past family history, past medical history, past social history, past surgical history and problem list.      Current Outpatient Medications:     cyclobenzaprine (FLEXERIL) 5 MG tablet, Take 1 tablet by mouth 3 (Three) Times a Day As Needed for Muscle Spasms., Disp: 90 tablet, Rfl: 1    diclofenac (VOLTAREN) 75 MG EC tablet, Take 1 tablet by mouth As Needed (pain). Pt reports taking 2-3 times weekly, Disp: 30 tablet, Rfl: 1    lisinopril (PRINIVIL,ZESTRIL) 20 MG tablet, Take 1 tablet by mouth Daily., Disp: 90 tablet, Rfl: 0    empagliflozin (Jardiance) 10 MG tablet tablet, Take 1 tablet by mouth Daily., Disp: 35 tablet, Rfl: 0            Physical Exam  /88 (BP Location: Left arm, Patient Position: Sitting, Cuff Size: Adult)   Pulse 94   Temp 98.4 °F (36.9 °C) (Infrared)   Ht 157.5 cm (62\")   Wt 79.7 kg (175 lb 9.6 oz)   SpO2 96%   BMI 32.12 kg/m²     Physical Exam  Vitals reviewed.   Constitutional:       General: She is not in acute distress.     Appearance: Normal appearance.   HENT:      Head: Normocephalic and atraumatic.      Nose: Nose normal.      Mouth/Throat:      Mouth: Mucous membranes are moist.   Eyes:      Conjunctiva/sclera: Conjunctivae normal. "   Pulmonary:      Effort: Pulmonary effort is normal.   Skin:     General: Skin is warm and dry.   Neurological:      General: No focal deficit present.      Mental Status: She is alert.   Psychiatric:         Mood and Affect: Mood normal.           Results for orders placed or performed in visit on 10/08/24   Comprehensive Metabolic Panel    Collection Time: 10/08/24 11:01 AM    Specimen: Blood   Result Value Ref Range    Glucose 95 65 - 99 mg/dL    BUN 21 8 - 23 mg/dL    Creatinine 1.07 (H) 0.57 - 1.00 mg/dL    EGFR Result 55.3 (L) >60.0 mL/min/1.73    BUN/Creatinine Ratio 19.6 7.0 - 25.0    Sodium 142 136 - 145 mmol/L    Potassium 4.7 3.5 - 5.2 mmol/L    Chloride 106 98 - 107 mmol/L    Total CO2 24.4 22.0 - 29.0 mmol/L    Calcium 9.9 8.6 - 10.5 mg/dL    Total Protein 6.9 6.0 - 8.5 g/dL    Albumin 4.2 3.5 - 5.2 g/dL    Globulin 2.7 gm/dL    A/G Ratio 1.6 g/dL    Total Bilirubin 0.3 0.0 - 1.2 mg/dL    Alkaline Phosphatase 66 39 - 117 U/L    AST (SGOT) 15 1 - 32 U/L    ALT (SGPT) 15 1 - 33 U/L   Hemoglobin A1c    Collection Time: 10/08/24 11:01 AM    Specimen: Blood   Result Value Ref Range    Hemoglobin A1C 6.10 (H) 4.80 - 5.60 %   CBC & Differential    Collection Time: 10/08/24 11:01 AM    Specimen: Blood   Result Value Ref Range    WBC 7.57 3.40 - 10.80 10*3/mm3    RBC 4.54 3.77 - 5.28 10*6/mm3    Hemoglobin 12.9 12.0 - 15.9 g/dL    Hematocrit 39.8 34.0 - 46.6 %    MCV 87.7 79.0 - 97.0 fL    MCH 28.4 26.6 - 33.0 pg    MCHC 32.4 31.5 - 35.7 g/dL    RDW 13.3 12.3 - 15.4 %    Platelets 270 140 - 450 10*3/mm3    Neutrophil Rel % 66.1 42.7 - 76.0 %    Lymphocyte Rel % 24.3 19.6 - 45.3 %    Monocyte Rel % 6.5 5.0 - 12.0 %    Eosinophil Rel % 1.8 0.3 - 6.2 %    Basophil Rel % 0.8 0.0 - 1.5 %    Neutrophils Absolute 5.00 1.70 - 7.00 10*3/mm3    Lymphocytes Absolute 1.84 0.70 - 3.10 10*3/mm3    Monocytes Absolute 0.49 0.10 - 0.90 10*3/mm3    Eosinophils Absolute 0.14 0.00 - 0.40 10*3/mm3    Basophils Absolute 0.06 0.00 -  0.20 10*3/mm3    Immature Granulocyte Rel % 0.5 0.0 - 0.5 %    Immature Grans Absolute 0.04 0.00 - 0.05 10*3/mm3    nRBC 0.0 0.0 - 0.2 /100 WBC           Diagnoses and all orders for this visit:    1. Type 2 diabetes mellitus without complication, without long-term current use of insulin (Primary)  -     empagliflozin (Jardiance) 10 MG tablet tablet; Take 1 tablet by mouth Daily.  Dispense: 35 tablet; Refill: 0    2. Adrenal adenoma, unspecified laterality      Try Jardiance samples.  Contact me in 2 weeks to tell me how this is going.     Adrenal adenomas seen on MRI.  Note written into chart to remind me about repeat CT scan in 6 months to 1 year.      F/u in 2 months.

## 2024-11-21 PROBLEM — D35.00 ADRENAL ADENOMA: Status: ACTIVE | Noted: 2024-11-21

## 2024-11-30 DIAGNOSIS — M70.62 TROCHANTERIC BURSITIS OF LEFT HIP: ICD-10-CM

## 2024-12-03 RX ORDER — DICLOFENAC SODIUM 75 MG/1
TABLET, DELAYED RELEASE ORAL
Qty: 30 TABLET | Refills: 2 | Status: SHIPPED | OUTPATIENT
Start: 2024-12-03

## 2024-12-06 DIAGNOSIS — E11.9 TYPE 2 DIABETES MELLITUS WITHOUT COMPLICATION, WITHOUT LONG-TERM CURRENT USE OF INSULIN: ICD-10-CM

## 2024-12-06 NOTE — TELEPHONE ENCOUNTER
Patient comment: You asked me to report after taking for 2 weeks. I have not had any side effects. I have 2 more weeks from the sample but will need before I see you again. Thank you   Rx Refill Note  Requested Prescriptions     Pending Prescriptions Disp Refills    empagliflozin (Jardiance) 10 MG tablet tablet 90 tablet 1     Sig: Take 1 tablet by mouth Daily.      Last office visit with prescribing clinician: 11/20/2024   Last telemedicine visit with prescribing clinician: Visit date not found   Next office visit with prescribing clinician: 1/20/2025                         Would you like a call back once the refill request has been completed: [] Yes [] No    If the office needs to give you a call back, can they leave a voicemail: [] Yes [] No    Jenelle Hernandez MA  12/06/24, 16:14 EST

## 2025-01-02 ENCOUNTER — PATIENT MESSAGE (OUTPATIENT)
Dept: INTERNAL MEDICINE | Facility: CLINIC | Age: 75
End: 2025-01-02
Payer: MEDICARE

## 2025-01-02 DIAGNOSIS — E11.9 TYPE 2 DIABETES MELLITUS WITHOUT COMPLICATION, WITHOUT LONG-TERM CURRENT USE OF INSULIN: Primary | ICD-10-CM

## 2025-01-03 ENCOUNTER — HOSPITAL ENCOUNTER (OUTPATIENT)
Dept: MAMMOGRAPHY | Facility: HOSPITAL | Age: 75
Discharge: HOME | End: 2025-01-03
Admitting: INTERNAL MEDICINE
Payer: MEDICARE

## 2025-01-03 ENCOUNTER — TELEPHONE (OUTPATIENT)
Dept: INTERNAL MEDICINE | Facility: CLINIC | Age: 75
End: 2025-01-03

## 2025-01-03 DIAGNOSIS — Z12.31 BREAST CANCER SCREENING BY MAMMOGRAM: ICD-10-CM

## 2025-01-03 PROCEDURE — 77063 BREAST TOMOSYNTHESIS BI: CPT

## 2025-01-03 PROCEDURE — 77067 SCR MAMMO BI INCL CAD: CPT | Performed by: RADIOLOGY

## 2025-01-03 PROCEDURE — 77067 SCR MAMMO BI INCL CAD: CPT

## 2025-01-03 PROCEDURE — 77063 BREAST TOMOSYNTHESIS BI: CPT | Performed by: RADIOLOGY

## 2025-01-03 RX ORDER — GLIPIZIDE 5 MG/1
5 TABLET ORAL EVERY MORNING
Qty: 30 TABLET | Refills: 0 | Status: SHIPPED | OUTPATIENT
Start: 2025-01-03

## 2025-01-03 NOTE — TELEPHONE ENCOUNTER
Caller: Joan Block    Relationship: Self    Best call back number: [phone]     What medication are you requesting:      What are your current symptoms: VAGINAL ITCHING    How long have you been experiencing symptoms:      Have you had these symptoms before:    [] Yes  [] No    Have you been treated for these symptoms before:   [] Yes  [] No    If a prescription is needed, what is your preferred pharmacy and phone number: [facility] - [place], [place] - [address] [phone] [facility] [phone]      Additional notes: PATIENT CALLED SHE HAD SENT A MY CHART MESSAGE FOR VAGINAL ITCHING DUE TO THE JARDIANCE MEDICATION.  WANTS TO KNOW IF DR GARCIA WILL CALL IN MEDICATION FOR THE VAGINAL ITCHING.  SHE HAS TRIED OVER THE COUNTER AZO AND VAGINAL CREAM, WHICH DID NOT WORK.

## 2025-01-08 ENCOUNTER — OFFICE VISIT (OUTPATIENT)
Dept: PAIN MEDICINE | Facility: CLINIC | Age: 75
End: 2025-01-08
Payer: MEDICARE

## 2025-01-08 ENCOUNTER — PREP FOR SURGERY (OUTPATIENT)
Dept: SURGERY | Facility: SURGERY CENTER | Age: 75
End: 2025-01-08
Payer: MEDICARE

## 2025-01-08 VITALS
TEMPERATURE: 98.8 F | SYSTOLIC BLOOD PRESSURE: 158 MMHG | OXYGEN SATURATION: 95 % | RESPIRATION RATE: 18 BRPM | HEIGHT: 62 IN | DIASTOLIC BLOOD PRESSURE: 96 MMHG | HEART RATE: 88 BPM | WEIGHT: 180.2 LBS | BODY MASS INDEX: 33.16 KG/M2

## 2025-01-08 DIAGNOSIS — M54.16 LUMBAR RADICULOPATHY: Primary | ICD-10-CM

## 2025-01-08 DIAGNOSIS — M48.061 LUMBAR FORAMINAL STENOSIS: ICD-10-CM

## 2025-01-08 PROCEDURE — 1125F AMNT PAIN NOTED PAIN PRSNT: CPT | Performed by: NURSE PRACTITIONER

## 2025-01-08 PROCEDURE — 3077F SYST BP >= 140 MM HG: CPT | Performed by: NURSE PRACTITIONER

## 2025-01-08 PROCEDURE — 1160F RVW MEDS BY RX/DR IN RCRD: CPT | Performed by: NURSE PRACTITIONER

## 2025-01-08 PROCEDURE — 3079F DIAST BP 80-89 MM HG: CPT | Performed by: NURSE PRACTITIONER

## 2025-01-08 PROCEDURE — 99214 OFFICE O/P EST MOD 30 MIN: CPT | Performed by: NURSE PRACTITIONER

## 2025-01-08 PROCEDURE — 1159F MED LIST DOCD IN RCRD: CPT | Performed by: NURSE PRACTITIONER

## 2025-01-08 RX ORDER — DIAZEPAM 5 MG/1
5 TABLET ORAL ONCE
Status: CANCELLED | OUTPATIENT
Start: 2025-01-08 | End: 2025-01-08

## 2025-01-08 NOTE — PROGRESS NOTES
CHIEF COMPLAINT  Back pain  Pt here to discuss left sided low back  pain that radiates across her back to the right and goes down her right leg.  Lying down and walking increases lumbar pain,sitting is better.    Subjective   Joan Block is a 74 y.o. female.   She presents to the office for evaluation of back pain. She was referred here by Dr. Norman Brown.    Ms. Block's pain back pain is recurrent and started on October 1, 2024. She states that her pain gradually increased while she was pushing her  in his wheelchair. She states that she bent over to  grocery bags and her pain increased significantly. Previously she had a flare of back pain in 2022 which lasted for 6 weeks.     Today her pain is 5/10VAS in severity. Her back pain was continuous from October to December. This has improved slightly and is now intermittent pain.  She describes the pain as aching pain in her left low back. This pain radiates across her back and then radiates into her right groin and the lateral aspect of her right leg into her right foot.  Her pain is worsened by walking, bending, and getting out of bed first thing in the morning.  Her pain is improved by laying on a heating pad and alternating ibuprofen and tylenol.  She is utilizing magnesium supplements and tonic water for her muscle spasms and states this is helpful.     Past pain medications: Diclofenac, Flexeril     Current pain medications: OTC Tylenol and Ibuprofen     Past therapies:  Physical Therapy: Yes, completed in the past, has not completed recently.   Chiropractor: None  Massage Therapy: None  Red Light Therapy: Yes, No relief  TENS: Yes, no relief  Neck or back surgery: None  Past pain management: None     Previous Injections:   None     Back Pain  This is a chronic problem. The current episode started more than 1 month ago. The problem occurs intermittently. The problem has been comes and goes since onset. The pain is present in the lumbar  spine. The quality of the pain is described as aching. The pain radiates to the right thigh and right foot (lateral aspect of RLE). The pain is at a severity of 5/10. The symptoms are aggravated by bending (walking). Associated symptoms include weakness (RLE). Pertinent negatives include no abdominal pain, chest pain, dysuria, fever, headaches or numbness. She has tried heat, muscle relaxant, analgesics and NSAIDs for the symptoms.        Current Outpatient Medications:     cyclobenzaprine (FLEXERIL) 5 MG tablet, Take 1 tablet by mouth 3 (Three) Times a Day As Needed for Muscle Spasms., Disp: 90 tablet, Rfl: 1    lisinopril (PRINIVIL,ZESTRIL) 20 MG tablet, Take 1 tablet by mouth Daily., Disp: 90 tablet, Rfl: 0    empagliflozin (Jardiance) 10 MG tablet tablet, Take 1 tablet by mouth Daily., Disp: 90 tablet, Rfl: 1    glipizide (Glucotrol) 5 MG tablet, Take 1 tablet by mouth Every Morning., Disp: 30 tablet, Rfl: 0    The following portions of the patient's history were reviewed and updated as appropriate: allergies, current medications, past family history, past medical history, past social history, past surgical history, and problem list.    REVIEW OF PERTINENT MEDICAL DATA    Office visit with Dr. Brown reviewed.  Patient returns with MRI report.  This shows multi level disc degeneration changes particularly at L4-5 there is nerve entrapment.  She is having right radiculopathy.  She has good distal pulses calf is nontender.  Schedule for lumbar epidural injection with return in 2 months to  efficacy of this blocks.    MRI LUMBAR SPINE WO CONTRAST     Date of Exam: 11/11/2024 10:57 AM EST     Indication: Evaluate for foraminal stenosis, scoliosis.     Comparison: None available.     Technique:  Routine multiplanar/multisequence sequence images of the lumbar spine were obtained without contrast administration.      Findings:  There is dextroconvex ileus of the mid lumbar spine. There is disc desiccation and disc  space narrowing throughout the lumbar spine. There are edematous degenerative endplate changes at the L2/3 level. There is otherwise normal height, alignment, and   signal intensity of the other lumbar vertebral bodies. Spinal cord terminates at the T12 level with normal signal seen within the conus.     There is a partially imaged 2 cm nodule of the right adrenal gland. There is a 1.5 cm nodule of the left adrenal gland. These are likely adenomas in the absence of a known malignancy.     Axial images demonstrate:     T12/L1: No severe disc bulge. Facet joints are within normal its. No spinal canal stenosis or neural foraminal narrowing     L1/2: No significant disc bulge. There are mild degenerative facet changes. No spinal canal stenosis or neural foraminal narrowing.     L2/3: Mild circumferential disc bulge and mild degenerative facet change. No spinal canal stenosis. There is mild bilateral neural foraminal narrowing.     L3/4: Mild circumferential disc bulge asymmetric to the left. There are mild degenerative facet changes. No spinal canal stenosis. There is moderate left and mild right neural foraminal narrowing.     L 4/5: Moderate circumferential disc bulge asymmetric to the right. There are moderate degenerative facet changes bilaterally. There is narrowing of the right lateral recess with disc material contacting the transiting right L5 nerve root. There is   borderline canal stenosis. There is mild left and severe right neural foraminal narrowing.     L5/S1: No significant is bulge. There are mild degenerative facet changes. No spinal canal stenosis or neural foraminal narrowing.     IMPRESSION:  Impression:     1. Multilevel degenerative disc disease and degenerative facet change resulting in multilevel neural foraminal narrowing and lateral recess stenosis as detailed above.     Electronically Signed: Wesley Hardy MD    11/11/2024 12:20 PM EST    Workstation ID: [id number]    10/8/2024:  Hemoglobin  "A1c-6.1  Creatinine-1.07  Platelets-270    Review of Systems   Constitutional:  Negative for chills and fever.   Respiratory:  Negative for chest tightness and shortness of breath.    Cardiovascular:  Negative for chest pain.   Gastrointestinal:  Negative for abdominal pain and constipation.   Genitourinary:  Negative for difficulty urinating and dysuria.   Musculoskeletal:  Positive for back pain.   Neurological:  Positive for weakness (RLE). Negative for dizziness, numbness and headaches.   Psychiatric/Behavioral:  Negative for suicidal ideas. The patient is not nervous/anxious.      --  The aforementioned information the Chief Complaint section and above subjective data including any HPI data, and also the Review of Systems data, has been personally reviewed and affirmed.  --    Vitals:    01/08/25 1401 01/08/25 1445   BP: (!) 187/87 158/96   BP Location: Right arm Left arm   Patient Position: Sitting Sitting   Cuff Size: Adult    Pulse: 88    Resp: 18    Temp: 98.8 °F (37.1 °C)    TempSrc: Temporal    SpO2: 95%    Weight: 81.7 kg (180 lb 3.2 oz)    Height: 157.5 cm (62\")    PainSc:   5      Objective   Physical Exam  Vitals and nursing note reviewed.   Constitutional:       Appearance: Normal appearance. She is well-developed.   Eyes:      General: Lids are normal.   Cardiovascular:      Rate and Rhythm: Normal rate.   Pulmonary:      Effort: Pulmonary effort is normal.   Musculoskeletal:      Cervical back: Normal range of motion.      Lumbar back: Tenderness and bony tenderness present. Decreased range of motion. Positive right straight leg raise test. Negative left straight leg raise test.   Neurological:      Mental Status: She is alert and oriented to person, place, and time.      Gait: Gait normal.      Comments: Right Iliopsoas: 4/5  Left Iliopsoas: 4/5  Right Quadriceps: 5/5  Left Quadriceps: 5/5  Right Hamstrings: 5/5  Left Hamstrings: 5/5  Right Anterior Tibial: 5/5  Left Anterior Tibial: 5/5  Right " Gastroc: 5/5  Left Gastroc: 5/5     Psychiatric:         Attention and Perception: Attention normal.         Mood and Affect: Mood normal.         Speech: Speech normal.         Behavior: Behavior normal.         Judgment: Judgment normal.       Assessment & Plan   Diagnoses and all orders for this visit:    1. Lumbar radiculopathy (Primary)    2. Lumbar foraminal stenosis      --- Joandeepak Block reports a pain score of 5.  Given her pain assessment as noted, treatment options were discussed and the following options were decided upon as a follow-up plan to address the patient's pain: steroid injections.    --- LESI at L4/5 (valium)  Reviewed the procedure at length with the patient.  Included in the review was expectations, complications, risk and benefits.The procedure was described in detail and the risks, benefits and alternatives were discussed with the patient (including but not limited to: bleeding, infection, nerve damage, worsening of pain, inability to perform injection, paralysis, seizures, coma, no pain relief and death) who agreed to proceed.  Discussed the potential for sedation if warranted/wanted.  The procedure will plan to be performed at [facility] with fluoroscopic guidance(unless ultrasound is indicated) and could potentially have steroids and contrast dye used. Questions were answered and in a way the patient could understand.  Patient verbalized understanding and wishes to proceed.  This intervention will be ordered.  Discussed with patient that all procedures are part of a multimodal plan of care and include either formal PT or a home exercise program.  Patient has no evidence of coagulopathy or current infection.    --- Consider return to PT after her LESI once her pain has improved. At this time d/t the level of discomfort she has in her RLE I recommend proceeding with LESI.   --- Follow-up after procedure     GIANA REPORT    As the clinician, I personally reviewed  the GIANA from 1/8/2025 while the patient was in the office today.    Dictated utilizing Dragon dictation.

## 2025-01-09 ENCOUNTER — TELEPHONE (OUTPATIENT)
Dept: PAIN MEDICINE | Facility: HOSPITAL | Age: 75
End: 2025-01-09
Payer: MEDICARE

## 2025-01-09 ENCOUNTER — OFFICE VISIT (OUTPATIENT)
Dept: INTERNAL MEDICINE | Facility: CLINIC | Age: 75
End: 2025-01-09
Payer: MEDICARE

## 2025-01-09 VITALS
OXYGEN SATURATION: 97 % | HEIGHT: 62 IN | SYSTOLIC BLOOD PRESSURE: 160 MMHG | WEIGHT: 180 LBS | HEART RATE: 97 BPM | DIASTOLIC BLOOD PRESSURE: 82 MMHG | BODY MASS INDEX: 33.13 KG/M2 | TEMPERATURE: 99.3 F

## 2025-01-09 DIAGNOSIS — I10 ESSENTIAL HYPERTENSION: Primary | Chronic | ICD-10-CM

## 2025-01-09 DIAGNOSIS — B37.31 VAGINAL CANDIDIASIS: ICD-10-CM

## 2025-01-09 DIAGNOSIS — I10 ESSENTIAL HYPERTENSION: Chronic | ICD-10-CM

## 2025-01-09 PROCEDURE — 3077F SYST BP >= 140 MM HG: CPT | Performed by: FAMILY MEDICINE

## 2025-01-09 PROCEDURE — 1160F RVW MEDS BY RX/DR IN RCRD: CPT | Performed by: FAMILY MEDICINE

## 2025-01-09 PROCEDURE — 3079F DIAST BP 80-89 MM HG: CPT | Performed by: FAMILY MEDICINE

## 2025-01-09 PROCEDURE — 99214 OFFICE O/P EST MOD 30 MIN: CPT | Performed by: FAMILY MEDICINE

## 2025-01-09 PROCEDURE — 1125F AMNT PAIN NOTED PAIN PRSNT: CPT | Performed by: FAMILY MEDICINE

## 2025-01-09 PROCEDURE — 1159F MED LIST DOCD IN RCRD: CPT | Performed by: FAMILY MEDICINE

## 2025-01-09 RX ORDER — LISINOPRIL 30 MG/1
30 TABLET ORAL DAILY
Qty: 30 TABLET | Refills: 1 | Status: SHIPPED | OUTPATIENT
Start: 2025-01-09

## 2025-01-09 RX ORDER — FLUCONAZOLE 150 MG/1
150 TABLET ORAL ONCE
Qty: 1 TABLET | Refills: 0 | Status: SHIPPED | OUTPATIENT
Start: 2025-01-09 | End: 2025-01-09

## 2025-01-09 NOTE — PROGRESS NOTES
Subjective   Joan Block is a 74 y.o. female presenting today for follow up of   Chief Complaint   Patient presents with    Hypertension     Has been having hip pain and is supposed to have an epidural for the pain but is unable to until BP is lower.        History of Present Illness     This is a 73 yo patient who sees Dr. Jenkins.  She has hypertension and lumbar radicular pain.  She is scheduled for a lumbar epidural injection with pain management on 1/14/2025.  Her blood pressure was elevated at her pain management appointment to 187/87 on the right and 158/96 on the left.  She was told to have this addressed before her injection.  She takes lisinopril 20 mg daily and tolerates this well.  She has a blood pressure cuff at home but doesn't like to use it because it hurts her arm.    Pt reports vaginal itch that started after starting Jardiance.  She has since discontinued the Jardiance.  Vagisil helps temporarily.      Patient Active Problem List   Diagnosis    Distal radial fracture    Closed fracture of left distal radius    Fracture, Col[place], left, closed    Essential hypertension    Mixed hyperlipidemia    Obesity (BMI 30-39.9)    Sacroiliitis, not elsewhere classified    Prediabetes    Type 2 diabetes mellitus without complication, without long-term current use of insulin    Bursitis of left hip    Sciatica of left side    Adrenal adenoma    Lumbar radiculopathy    Lumbar foraminal stenosis       Current Outpatient Medications on File Prior to Visit   Medication Sig    [DISCONTINUED] lisinopril (PRINIVIL,ZESTRIL) 20 MG tablet Take 1 tablet by mouth Daily.    cyclobenzaprine (FLEXERIL) 5 MG tablet Take 1 tablet by mouth 3 (Three) Times a Day As Needed for Muscle Spasms. (Patient not taking: Reported on 1/9/2025)    empagliflozin (Jardiance) 10 MG tablet tablet Take 1 tablet by mouth Daily.    glipizide (Glucotrol) 5 MG tablet Take 1 tablet by mouth Every Morning.     No current facility-administered  "medications on file prior to visit.          The following portions of the patient's history were reviewed and updated as appropriate: allergies, current medications, past family history, past medical history, past social history, past surgical history and problem list.    Review of Systems   Constitutional: Negative.    Respiratory: Negative.     Cardiovascular: Negative.    Genitourinary:  Positive for vaginal pain (itch).   Musculoskeletal:  Positive for back pain.       Objective   Vitals:    01/09/25 1417 01/09/25 1458 01/09/25 1459   BP: 154/80 174/86 160/82   BP Location: Left arm Right arm    Patient Position: Sitting     Cuff Size: Large Adult     Pulse: 97     Temp: 99.3 °F (37.4 °C)     TempSrc: Infrared     SpO2: 97%     Weight: 81.6 kg (180 lb)     Height: 157.5 cm (62\")         BP Readings from Last 3 Encounters:   01/09/25 160/82   01/08/25 158/96   11/20/24 132/88        Wt Readings from Last 3 Encounters:   01/09/25 81.6 kg (180 lb)   01/08/25 81.7 kg (180 lb 3.2 oz)   11/20/24 79.7 kg (175 lb 9.6 oz)        Body mass index is 32.92 kg/m².  Nursing notes and vitals reviewed.    Physical Exam  Vitals and nursing note reviewed.   Constitutional:       General: She is not in acute distress.     Appearance: She is obese. She is not ill-appearing.   HENT:      Head: Normocephalic and atraumatic.      Mouth/Throat:      Mouth: Mucous membranes are moist.   Eyes:      Extraocular Movements: Extraocular movements intact.      Conjunctiva/sclera: Conjunctivae normal.   Cardiovascular:      Rate and Rhythm: Normal rate and regular rhythm.      Heart sounds: Normal heart sounds.   Pulmonary:      Effort: Pulmonary effort is normal.      Breath sounds: Normal breath sounds.   Musculoskeletal:      Cervical back: Neck supple. No rigidity.   Skin:     General: Skin is warm and dry.   Neurological:      General: No focal deficit present.      Mental Status: She is alert and oriented to person, place, and time. "   Psychiatric:         Mood and Affect: Mood normal.         Behavior: Behavior normal.         No results found for this or any previous visit (from the past 4 weeks).      Assessment & Plan   Diagnoses and all orders for this visit:    1. Essential hypertension (Primary)  -     lisinopril (PRINIVIL,ZESTRIL) 30 MG tablet; Take 1 tablet by mouth Daily.  Dispense: 30 tablet; Refill: 1    2. Vaginal candidiasis       -       fluconazole 150 mg tablet; Take 1 tablet by mouth X 1.      Repeat blood pressure in the office 174/86 right, 160/82 left.  We will try increasing the lisinopril to 30 mg daily.  Monitor home blood pressures.  She has f/u with Dr. Jenkins later this month.  Watch for light-headedness, which could signify that BP is too low.    Vaginal itch after Jardiance.  Fluconazole prescribed.      Medications, including side effects, were discussed with the patient. Patient verbalized understanding.  The plan of care was discussed. All questions were answered. Patient verbalized understanding.      Return for Next scheduled follow up.

## 2025-01-09 NOTE — TELEPHONE ENCOUNTER
Pre procedure pain injection phone call: Spoke to patient instructed on arrival time, location and to bring a . Also instructed okay to eat and drink prior to arrival.

## 2025-01-13 NOTE — DISCHARGE INSTRUCTIONS
[facility] - Post-procedure Instructions              --  While there are no absolute restrictions, it is recommended that you do not perform strenuous activity today. In the morning, you may resume your level of activity as before your block.    --  If you have a band-aid at your injection site, please remove it later today. Observe the area for any redness, swelling, pus-like drainage, or a temperature over 101°. If any of these symptoms occur, please call your doctor at [phone]. If after office hours, leave a message and the on-call provider will return your call.    --  Ice may be applied to your injection site. It is recommended you avoid direct heat (heating pad; hot tub) for 1-2 days.    --  Call [facility] at [phone] if you experience persistent headache, persistent bleeding from the injection site, or severe pain not relieved by heat or oral medication.    --  Do not make important decisions today.    --  Due to the effects of the block and/or the Sedation, DO NOT drive or operate hazardous machinery for 12 hours.  Local anesthetics may cause numbness after procedure and precautions must be taken with regards to operating equipment as well as with walking, even if ambulating with assistance of another person or with an assistive device.    --  Do not drink alcohol for 12 hours.    -- You may return to work tomorrow, or as directed by your referring doctor.    --  Occasionally you may notice a slight increase in your pain after the procedure. This should start to improve within the next 24-48 hours. Radiofrequency ablation procedure pain may last 3-4 weeks.    --  It may take as long as 3-4 days before you notice a gradual improvement in your pain and/or other symptoms.    -- You may continue to take your prescribed pain medication as needed.    --  Some normal possible side effects of steroid use could include fluid retention, increased blood sugar, dull headache, increased  sweating, increased appetite, mood swings and flushing.    --  Diabetics are recommended to watch their blood glucose level closely for 24-48 hours after the injection.    --  Must stay in PACU for 20 min upon arrival and prove no leg weakness before being discharged.    --  IN THE EVENT OF A LIFE THREATENING EMERGENCY, (CHEST PAIN, BREATHING DIFFICULTIES, PARALYSIS…) YOU SHOULD GO TO YOUR NEAREST EMERGENCY ROOM.    --  You should be contacted by our office within 2-3 days to schedule follow up or next appointment date (if not already scheduled).  If not contacted within 7 days, please call the office at [phone].

## 2025-01-14 ENCOUNTER — HOSPITAL ENCOUNTER (OUTPATIENT)
Dept: GENERAL RADIOLOGY | Facility: HOSPITAL | Age: 75
Discharge: HOME | End: 2025-01-14
Payer: MEDICARE

## 2025-01-14 ENCOUNTER — HOSPITAL ENCOUNTER (OUTPATIENT)
Dept: PAIN MEDICINE | Facility: HOSPITAL | Age: 75
Discharge: HOME | End: 2025-01-14
Payer: MEDICARE

## 2025-01-14 VITALS
RESPIRATION RATE: 19 BRPM | OXYGEN SATURATION: 96 % | SYSTOLIC BLOOD PRESSURE: 172 MMHG | HEART RATE: 78 BPM | DIASTOLIC BLOOD PRESSURE: 85 MMHG | TEMPERATURE: 98.1 F

## 2025-01-14 DIAGNOSIS — R52 PAIN: ICD-10-CM

## 2025-01-14 LAB — GLUCOSE BLDC GLUCOMTR-MCNC: 141 MG/DL (ref 70–130)

## 2025-01-14 PROCEDURE — 82948 REAGENT STRIP/BLOOD GLUCOSE: CPT

## 2025-01-14 PROCEDURE — 25010000002 BUPIVACAINE (PF) 0.25 % SOLUTION: Performed by: ANESTHESIOLOGY

## 2025-01-14 PROCEDURE — 25010000002 DEXAMETHASONE SODIUM PHOSPHATE 10 MG/ML SOLUTION: Performed by: ANESTHESIOLOGY

## 2025-01-14 PROCEDURE — 77002 NEEDLE LOCALIZATION BY XRAY: CPT

## 2025-01-14 PROCEDURE — C1755 CATHETER, INTRASPINAL: HCPCS

## 2025-01-14 PROCEDURE — 25510000001 IOPAMIDOL 61 % SOLUTION: Performed by: ANESTHESIOLOGY

## 2025-01-14 PROCEDURE — 25010000002 LIDOCAINE PF 1% 1 % SOLUTION: Performed by: ANESTHESIOLOGY

## 2025-01-14 RX ORDER — SODIUM CHLORIDE 9 MG/ML
4 INJECTION, SOLUTION INTRAMUSCULAR; INTRAVENOUS; SUBCUTANEOUS ONCE
Status: COMPLETED | OUTPATIENT
Start: 2025-01-14 | End: 2025-01-14

## 2025-01-14 RX ORDER — DIAZEPAM 5 MG/1
5 TABLET ORAL ONCE
Status: DISCONTINUED | OUTPATIENT
Start: 2025-01-14 | End: 2025-01-15 | Stop reason: HOSPADM

## 2025-01-14 RX ORDER — IOPAMIDOL 612 MG/ML
3 INJECTION, SOLUTION INTRAVASCULAR
Status: COMPLETED | OUTPATIENT
Start: 2025-01-14 | End: 2025-01-14

## 2025-01-14 RX ORDER — LIDOCAINE HYDROCHLORIDE 10 MG/ML
5 INJECTION, SOLUTION EPIDURAL; INFILTRATION; INTRACAUDAL; PERINEURAL ONCE
Status: COMPLETED | OUTPATIENT
Start: 2025-01-14 | End: 2025-01-14

## 2025-01-14 RX ORDER — DEXAMETHASONE SODIUM PHOSPHATE 10 MG/ML
10 INJECTION, SOLUTION INTRAMUSCULAR; INTRAVENOUS ONCE
Status: COMPLETED | OUTPATIENT
Start: 2025-01-14 | End: 2025-01-14

## 2025-01-14 RX ORDER — BUPIVACAINE HYDROCHLORIDE 2.5 MG/ML
0.5 INJECTION, SOLUTION EPIDURAL; INFILTRATION; INTRACAUDAL ONCE
Status: COMPLETED | OUTPATIENT
Start: 2025-01-14 | End: 2025-01-14

## 2025-01-14 RX ADMIN — DEXAMETHASONE SODIUM PHOSPHATE 10 MG: 10 INJECTION INTRAMUSCULAR; INTRAVENOUS at 10:09

## 2025-01-14 RX ADMIN — LIDOCAINE HYDROCHLORIDE 5 ML: 10 INJECTION, SOLUTION EPIDURAL; INFILTRATION; INTRACAUDAL; PERINEURAL at 10:03

## 2025-01-14 RX ADMIN — SODIUM CHLORIDE 4 ML: 9 INJECTION, SOLUTION INTRAMUSCULAR; INTRAVENOUS; SUBCUTANEOUS at 10:09

## 2025-01-14 RX ADMIN — IOPAMIDOL 3 ML: 612 INJECTION, SOLUTION INTRAVENOUS at 10:06

## 2025-01-14 RX ADMIN — BUPIVACAINE HYDROCHLORIDE 0.5 ML: 2.5 INJECTION, SOLUTION EPIDURAL; INFILTRATION; INTRACAUDAL; PERINEURAL at 10:09

## 2025-01-14 NOTE — H&P
Joan Block is a 74 y.o.  female who returns today for a scheduled procedure.  The previous clinic note has been reviewed.  There has been no change in the location or quality of the patient's pain.  The pain is currently rated as 3/10 in severity.  The patient is alert at the time of exam and is emotionally appropriate without significant debilities that would disqualify from the procedure.     Planned Procedure: L4-5 Epidural steroid injection     Vitals:    01/14/25 0927   BP: 175/51   Pulse: 79   Resp: 16   Temp: 98.3 °F (36.8 °C)   SpO2: 96%       The risks and benefits of the procedure have been discussed with the patient who has elected to proceed.  There are no contraindications to the procedure at this time.  Appropriate consent forms have been signed.  All questions regarding the planned procedure have been addressed.  Please see the separate documentation for details of the interventional procedure.

## 2025-01-14 NOTE — PROCEDURES
Procedures    L4/L5 Interlaminar Lumbar Epidural Steroid Injection   [facility]    PREOPERATIVE DIAGNOSIS:   Lumbar Radiculopathy and Lumbar foraminal stenosis   POSTOPERATIVE DIAGNOSIS:  Same as preop diagnosis    PROCEDURE:   Lumbar Epidural Steroid Injection, Therapeutic Interlaminar Injection, with epidurogram, at  L4/L5 level    PRE-PROCEDURE DISCUSSION WITH PATIENT:    Risks and complications were discussed with the patient prior to starting the procedure and informed consent was obtained.  We discussed various topics including but not limited to bleeding, infection, injury, paralysis, nerve injury, dural puncture, coma, death, worsening of clinical picture, lack of pain relief, and postprocedural soreness.    SURGEON:  Kamini Ballesteros MD    REASON FOR PROCEDURE:    Diagnostic injection at this level is needed    SEDATION:  No sedation was used for this procedure  ANESTHETIC:  Marcaine 0.25%  STEROID:   10mg dexamethasone    DESCRIPTON OF PROCEDURE:    After obtaining informed consent, I.V. was not started in the preop area.   The patient was taken to the operating room and placed in the prone position.  Heart rate, blood pressure, and pulse oximeter were monitored throughout, and sedation was provided as needed by the RN under my guidance. All pressure points were well padded.  The lumbar spine area was prepped with Chloraprep and draped in a sterile fashion.      AP fluoroscopic image was used to visualize the L4/L5 interspace.  The skin and subcutaneous tissue over the area was anesthetized with 1% Lidocaine.  An 18-Gauge Tuohy needle was then advanced through the anesthetized skin tract under fluoroscopic guidance in a coaxial view using a loss of resistance technique.  Lateral fluoroscopy was used to verify appropriate needle depth.  Once the needle tip was felt to be in the posterior epidural space, aspiration was noted to be negative for blood or CSF.  A volume of 1mL of Isovue was then  injected under live fluoroscopy in an AP view which produced good epidural spread with no evidence of loculation, vascular run-off or intrathecal spread.  Subsequently, a total volume of 5mL consisting of 10mg of dexamethasone, 0.5mL of 0.25% bupivcacaine and normal saline was injected without resistance.  The needle was removed intact.     ESTIMATED BLOOD LOSS:  <5 mL  SPECIMENS:  None    COMPLICATIONS:     No complications were noted., There was no indication of vascular uptake on live injection of contrast dye., and There was no indication of intrathecal uptake on live injection of contrast dye.    TOLERANCE & DISCHARGE CONDITION:    The patient tolerated the procedure well.  The patient was transported to the recovery area without difficulties.  The patient was discharged to home under the care of family in stable and satisfactory condition.    PLAN OF CARE:  The patient was given our standard instruction sheet.  The patient will Return to clinic 4-6 wks  The patient will resume all medications as per the medication reconciliation sheet.

## 2025-01-16 DIAGNOSIS — I10 ESSENTIAL HYPERTENSION: Chronic | ICD-10-CM

## 2025-01-16 RX ORDER — LISINOPRIL 40 MG/1
40 TABLET ORAL DAILY
Qty: 90 TABLET | Refills: 1 | Status: SHIPPED | OUTPATIENT
Start: 2025-01-16

## 2025-01-17 ENCOUNTER — TELEPHONE (OUTPATIENT)
Dept: PAIN MEDICINE | Facility: HOSPITAL | Age: 75
End: 2025-01-17
Payer: MEDICARE

## 2025-01-17 NOTE — TELEPHONE ENCOUNTER
This RN called patient to follow up about her injection she received 1/14/2025. Pt states she previously felt pain in legs and back. Currently, the leg pain has gone away but still experiencing some back pain at this time. Pain scale 6/10. Pt denies any signs of infection.

## 2025-01-20 ENCOUNTER — OFFICE VISIT (OUTPATIENT)
Dept: INTERNAL MEDICINE | Facility: CLINIC | Age: 75
End: 2025-01-20
Payer: MEDICARE

## 2025-01-20 VITALS
TEMPERATURE: 97.7 F | BODY MASS INDEX: 32.31 KG/M2 | DIASTOLIC BLOOD PRESSURE: 90 MMHG | HEART RATE: 86 BPM | WEIGHT: 175.6 LBS | OXYGEN SATURATION: 95 % | HEIGHT: 62 IN | SYSTOLIC BLOOD PRESSURE: 182 MMHG

## 2025-01-20 DIAGNOSIS — E11.9 TYPE 2 DIABETES MELLITUS WITHOUT COMPLICATION, WITHOUT LONG-TERM CURRENT USE OF INSULIN: Primary | ICD-10-CM

## 2025-01-20 DIAGNOSIS — F43.0 STRESS REACTION: ICD-10-CM

## 2025-01-20 DIAGNOSIS — I10 ESSENTIAL HYPERTENSION: Chronic | ICD-10-CM

## 2025-01-20 PROCEDURE — 99214 OFFICE O/P EST MOD 30 MIN: CPT | Performed by: INTERNAL MEDICINE

## 2025-01-20 PROCEDURE — 3077F SYST BP >= 140 MM HG: CPT | Performed by: INTERNAL MEDICINE

## 2025-01-20 PROCEDURE — 3080F DIAST BP >= 90 MM HG: CPT | Performed by: INTERNAL MEDICINE

## 2025-01-20 PROCEDURE — 1160F RVW MEDS BY RX/DR IN RCRD: CPT | Performed by: INTERNAL MEDICINE

## 2025-01-20 PROCEDURE — G2211 COMPLEX E/M VISIT ADD ON: HCPCS | Performed by: INTERNAL MEDICINE

## 2025-01-20 PROCEDURE — 1159F MED LIST DOCD IN RCRD: CPT | Performed by: INTERNAL MEDICINE

## 2025-01-20 PROCEDURE — 1126F AMNT PAIN NOTED NONE PRSNT: CPT | Performed by: INTERNAL MEDICINE

## 2025-01-20 RX ORDER — CYCLOBENZAPRINE HCL 5 MG
5 TABLET ORAL 3 TIMES DAILY PRN
COMMUNITY

## 2025-01-20 RX ORDER — BUPROPION HYDROCHLORIDE 150 MG/1
150 TABLET ORAL DAILY
Qty: 90 TABLET | Refills: 1 | Status: SHIPPED | OUTPATIENT
Start: 2025-01-20

## 2025-01-20 RX ORDER — AMLODIPINE BESYLATE 5 MG/1
5 TABLET ORAL DAILY
Qty: 90 TABLET | Refills: 1 | Status: SHIPPED | OUTPATIENT
Start: 2025-01-20

## 2025-01-20 NOTE — PROGRESS NOTES
"Chief Complaint  Diabetes    Subjective        Joan Block presents to [facility]  Diabetes        BP still running high.  Not sure about blood sugars.     Objective   Vital Signs:  BP (!) 182/90   Pulse 86   Temp 97.7 °F (36.5 °C)   Ht 157.5 cm (62\")   Wt 79.7 kg (175 lb 9.6 oz)   SpO2 95%   BMI 32.12 kg/m²   Estimated body mass index is 32.12 kg/m² as calculated from the following:    Height as of this encounter: 157.5 cm (62\").    Weight as of this encounter: 79.7 kg (175 lb 9.6 oz).            Physical Exam  Vitals reviewed.   Constitutional:       General: She is not in acute distress.     Appearance: Normal appearance.   HENT:      Head: Normocephalic and atraumatic.      Nose: Nose normal.      Mouth/Throat:      Mouth: Mucous membranes are moist.   Eyes:      Conjunctiva/sclera: Conjunctivae normal.   Pulmonary:      Effort: Pulmonary effort is normal.   Skin:     General: Skin is warm and dry.   Neurological:      General: No focal deficit present.      Mental Status: She is alert.   Psychiatric:         Mood and Affect: Mood normal.        Result Review :           Assessment and Plan   Diagnoses and all orders for this visit:    1. Type 2 diabetes mellitus without complication, without long-term current use of insulin (Primary)  -     Hemoglobin A1c  -     Basic Metabolic Panel  -     CBC (No Diff)    2. Essential hypertension  -     amLODIPine (NORVASC) 5 MG tablet; Take 1 tablet by mouth Daily.  Dispense: 90 tablet; Refill: 1  -     Basic Metabolic Panel  -     CBC (No Diff)    3. Stress reaction  -     buPROPion XL (Wellbutrin XL) 150 MG 24 hr tablet; Take 1 tablet by mouth Daily.  Dispense: 90 tablet; Refill: 1      Add amlodipine.  Start wellbutrin due to stress, anxiety.  See if this may help BP as well.  We will see where A1c is and see if diabetes needs more aggressive treatment as she has had reaction to multiple medications including metformin, " mounjaro, jardiance, glipizide.          Follow Up   Return in about 1 month (around 2/20/2025) for f/u T2DM, HTN.  Patient was given instructions and counseling regarding her condition or for health maintenance advice. Please see specific information pulled into the AVS if appropriate.

## 2025-01-21 LAB
BUN SERPL-MCNC: 14 MG/DL (ref 8–27)
BUN/CREAT SERPL: 15 (ref 12–28)
CALCIUM SERPL-MCNC: 10 MG/DL (ref 8.7–10.3)
CHLORIDE SERPL-SCNC: 106 MMOL/L (ref 96–106)
CO2 SERPL-SCNC: 23 MMOL/L (ref 20–29)
CREAT SERPL-MCNC: 0.95 MG/DL (ref 0.57–1)
EGFRCR SERPLBLD CKD-EPI 2021: 64 ML/MIN/1.73
ERYTHROCYTE [DISTWIDTH] IN BLOOD BY AUTOMATED COUNT: 13.4 % (ref 11.7–15.4)
GLUCOSE SERPL-MCNC: 91 MG/DL (ref 70–99)
HBA1C MFR BLD: 5.8 % (ref 4.8–5.6)
HCT VFR BLD AUTO: 43.1 % (ref 34–46.6)
HGB BLD-MCNC: 13.6 G/DL (ref 11.1–15.9)
MCH RBC QN AUTO: 28.3 PG (ref 26.6–33)
MCHC RBC AUTO-ENTMCNC: 31.6 G/DL (ref 31.5–35.7)
MCV RBC AUTO: 90 FL (ref 79–97)
PLATELET # BLD AUTO: 332 X10E3/UL (ref 150–450)
POTASSIUM SERPL-SCNC: 4.4 MMOL/L (ref 3.5–5.2)
RBC # BLD AUTO: 4.8 X10E6/UL (ref 3.77–5.28)
SODIUM SERPL-SCNC: 142 MMOL/L (ref 134–144)
WBC # BLD AUTO: 9.2 X10E3/UL (ref 3.4–10.8)

## 2025-01-28 ENCOUNTER — OFFICE VISIT (OUTPATIENT)
Age: 75
End: 2025-01-28
Payer: MEDICARE

## 2025-01-28 ENCOUNTER — PREP FOR SURGERY (OUTPATIENT)
Dept: OTHER | Facility: HOSPITAL | Age: 75
End: 2025-01-28
Payer: MEDICARE

## 2025-01-28 VITALS
SYSTOLIC BLOOD PRESSURE: 137 MMHG | HEART RATE: 100 BPM | DIASTOLIC BLOOD PRESSURE: 88 MMHG | OXYGEN SATURATION: 98 % | HEIGHT: 62 IN | TEMPERATURE: 97.5 F | BODY MASS INDEX: 32.09 KG/M2 | WEIGHT: 174.4 LBS

## 2025-01-28 DIAGNOSIS — M54.50 CHRONIC BILATERAL LOW BACK PAIN WITHOUT SCIATICA: ICD-10-CM

## 2025-01-28 DIAGNOSIS — M47.816 FACET ARTHRITIS, DEGENERATIVE, LUMBAR SPINE: ICD-10-CM

## 2025-01-28 DIAGNOSIS — M51.362 DEGENERATION OF INTERVERTEBRAL DISC OF LUMBAR REGION WITH DISCOGENIC BACK PAIN AND LOWER EXTREMITY PAIN: ICD-10-CM

## 2025-01-28 DIAGNOSIS — G89.29 CHRONIC BILATERAL LOW BACK PAIN WITHOUT SCIATICA: ICD-10-CM

## 2025-01-28 DIAGNOSIS — M47.816 FACET ARTHRITIS, DEGENERATIVE, LUMBAR SPINE: Primary | ICD-10-CM

## 2025-01-28 DIAGNOSIS — M51.362 DEGENERATION OF INTERVERTEBRAL DISC OF LUMBAR REGION WITH DISCOGENIC BACK PAIN AND LOWER EXTREMITY PAIN: Primary | ICD-10-CM

## 2025-01-28 DIAGNOSIS — G89.29 CHRONIC LEFT SI JOINT PAIN: ICD-10-CM

## 2025-01-28 DIAGNOSIS — M53.3 CHRONIC LEFT SI JOINT PAIN: ICD-10-CM

## 2025-01-28 PROCEDURE — 1159F MED LIST DOCD IN RCRD: CPT

## 2025-01-28 PROCEDURE — 3079F DIAST BP 80-89 MM HG: CPT

## 2025-01-28 PROCEDURE — 3075F SYST BP GE 130 - 139MM HG: CPT

## 2025-01-28 PROCEDURE — 1125F AMNT PAIN NOTED PAIN PRSNT: CPT

## 2025-01-28 PROCEDURE — 99214 OFFICE O/P EST MOD 30 MIN: CPT

## 2025-01-28 PROCEDURE — 1160F RVW MEDS BY RX/DR IN RCRD: CPT

## 2025-01-28 NOTE — PROGRESS NOTES
CHIEF COMPLAINT  Back pain     Subjective   Joan Block is a 74 y.o. female  who presents to the office for follow-up of procedure.  She completed a L4/L5 LESI on 1/14/25 performed by Dr. Ballesteros for management of low back and right leg pain. Patient reports 100% relief from the procedure x 2 days.     Today pain is 4/10VAS in severity. Her main complaint today is axial low back pain that radiates into bilateral hips, left side worse than right. Pain will intermittent radiate down right lateral leg into foot. She contributes 75% of pain to her back and 25% to leg pain. Describes this pain as a nearly continuous ache. Pain is worsened by prolonged standing/standing, walking long distances, and bending. Pain improves with rest/reposition, use of a heating pad, and medications. She utilizes tonic water, magnesium supplements, and alternates OTC Tylenol or Ibuprofen PRN.     She has completed physical, chiropractic, and red light therapy in the past with no relief.     She is followed by Dr. Norman Brown with orthopedics. She received a Left GTB injection in October that offered no relief.     Procedures:  1/14/25 - L4/L5 LESI - 100% relief x 2 days.     Back Pain  This is a chronic problem. The current episode started more than 1 year ago. The problem occurs constantly. The problem has been comes and goes since onset. The pain is present in the lumbar spine. The quality of the pain is described as aching. The pain radiates to the right thigh and right foot (right lateral thigh). The pain is at a severity of 4/10. The pain is moderate. The symptoms are aggravated by standing, sitting, bending, position and twisting. She has tried heat, muscle relaxant, analgesics and NSAIDs for the symptoms.      PEG Assessment   What number best describes your pain on average in the past week?7  What number best describes how, during the past week, pain has interfered with your enjoyment of life?8  What number best describes how,  "during the past week, pain has interfered with your general activity?  10    Review of Pertinent Medical Data ---  Reviewed office note from [facility] from 1/8/2025.  Patient presents for evaluation of back pain.  She was referred by Dr. Norman Brown.  Pain is on the left side of her low back and radiates into her right groin and right lateral aspect of her right leg into her right foot.  Pain is worsened by walking, bending and getting out of bed in the morning.  Pain improves with use of a heating pad and alternating ibuprofen and Tylenol.  Plan at this time was to trial an L4-5 LESI.  Scarlet encouraged patient to return to PT after her pain has improved following her procedure.        The following portions of the patient's history were reviewed and updated as appropriate: allergies, current medications, past family history, past medical history, past social history, past surgical history, and problem list.    Review of Systems   Constitutional:  Negative for activity change.   Eyes:  Negative for visual disturbance.   Respiratory:  Negative for chest tightness and shortness of breath.    Gastrointestinal:  Negative for constipation and diarrhea.   Genitourinary:  Negative for difficulty urinating and dyspareunia.   Musculoskeletal:  Positive for back pain.   Neurological:  Negative for dizziness and light-headedness.   Psychiatric/Behavioral:  Positive for sleep disturbance. Negative for agitation, self-injury and suicidal ideas. The patient is nervous/anxious.      I have reviewed and confirmed the accuracy of the ROS as documented by the MA/LPN/RN TRU [facility]    Vitals:    01/28/25 1244   BP: 137/88   Pulse: 100   Temp: 97.5 °F (36.4 °C)   SpO2: 98%   Weight: 79.1 kg (174 lb 6.4 oz)   Height: 157.5 cm (62\")   PainSc:   4   PainLoc: Back     Objective   Physical Exam  Constitutional:       Appearance: Normal appearance.   HENT:      Head: Normocephalic.   Cardiovascular:      Rate and Rhythm: " Normal rate and regular rhythm.   Pulmonary:      Effort: Pulmonary effort is normal.      Breath sounds: Normal breath sounds.   Musculoskeletal:         General: Normal range of motion.      Cervical back: Normal range of motion.      Lumbar back: Tenderness and bony tenderness present. Decreased range of motion. Positive right straight leg raise test and positive left straight leg raise test.      Comments: Slight pain with Left ANTOINE's test  + Left SI joint tenderness   + lumbar facet loading/tenderness   Skin:     General: Skin is warm and dry.      Capillary Refill: Capillary refill takes less than 2 seconds.   Neurological:      General: No focal deficit present.      Mental Status: She is alert and oriented to person, place, and time.   Psychiatric:         Mood and Affect: Mood normal.         Behavior: Behavior normal.         Thought Content: Thought content normal.         Cognition and Memory: Cognition normal.       Assessment & Plan   Diagnoses and all orders for this visit:    1. Facet arthritis, degenerative, lumbar spine (Primary)    2. Degeneration of intervertebral disc of lumbar region with discogenic back pain and lower extremity pain    3. Chronic bilateral low back pain without sciatica    4. Chronic left SI joint pain      Joan Block reports a pain score of 4.  Given her pain assessment as noted, treatment options were discussed and the following options were decided upon as a follow-up plan to address the patient's pain: continuation of current treatment plan for pain.    --- Bilateral L4-S1 MBB x 2 with goal of RFA  -------  Education about Medial Branch Blockade and RF Therapy:    This medial branch blockade (MBB) suggested is intended for diagnostic purposes, with the intent of offering the patient Radiofrequency thermal rhizotomy (RF) if the MBB is diagnostically effective.  The diagnostic blockade is necessary to determine the likelihood that RF therapy could be efficacious in  "providing long term relief to the patient.    Medial branches are sensory nerve branches that connect to a facet joint and transmit sensations & pain signals from that joint.  Facet is a term for the type of joints found in the spine.  Medial branches are the nerves that go to a facet, and therefore are also sometimes called \"facet joint nerves\" (FJNs).      In a medial branch blockade procedure, xray fluoroscopy is used to verify the locations of the outside of the joint lines which are being targeted.  Under xray guidance, needles are placed to these areas.  Contrast dye is injected to confirm proper placement, with dye flowing over the joint area, and to ensure that the dye does not flow into unintended areas such as a vein.  When this is confirmed, local anesthetic is injected to block the medial branch at that joint level.      If MBBs are diagnostically successful in blocking pain, then the patient is most likely a great candidate for Radiofrequency of those facet joint nerves.  In the RF procedure, needles are placed to the joint lines in the same fashion, and after testing, the needle tips are heated to thermally treat the nerves, blocking the nerves by in essence damaging the nerves with the heat treatment(non-pulsed).       Medically, a successful RF procedure should provide a patient with 50% pain relief or more for at least 6 months.  Clinical experience suggests that successful patients receive relief more in the range of 12 months on average.  We also discussed that a fortunate minority of patients receive therapeutic success from the MBB, and may not require RF ablation.  If a patient receives more than 8 weeks of relief from MBB, then occasional repeat MBB for therapeutic purposes is a very reasonable alternative therapy.  This course of therapy is consistent with our LCDs according to our CMS  in the area, and therefore other insurance providers should follow accordingly.  We will " monitor our patients to screen for these therapeutic responders and will offer RF therapy only when necessary.      We discussed that MBB & RF are not without risks.  Guidelines regarding anticoagulant use & neuraxial procedures will be respected.  Patients that are ill or otherwise may be at risk for sepsis will not have their spines accessed by neuraxial injections of any type.  This patient will not be offered these therapies if there is an increased risk.   We discussed that there is a risk of postprocedural pain and also a risk of worsening of clinical picture with these procedures as with any neuraxial procedure.    -------  --- May consider a Left SI Joint injection in the future if pain persists following LMBB/RFA procedures   --- Follow-up for procedure     Diagnostic Facet Joint Procedure:   MBB   The first diagnostic facet joint procedure is considered medically reasonable and necessary for the diagnosis and treatment of chronic pain for this patient due to the patient meeting all of the following criteria:    - 1. Moderate to severe chronic neck or low back pain, predominantly axial, that causes functional deficit measured on pain or disability scale.  - 2. Pain present for minimum of 3 months with documented failure to respond to noninvasive conservative management (as tolerated)  - 3. Absence of untreated radiculopathy or neurogenic claudication (except for radiculopathy caused by facet joint synovial cyst)  - 4. There is no non-facet pathology per clinical assessment or radiology studies that could explain the source of the patient’s pain, including but not limited to fracture, tumor, infection, or significant deformity.     GIANA REPORT  As the clinician, I personally reviewed the GIANA from 1/28/25 while the patient was in the office today.    Dictated utilizing Dragon dictation.

## 2025-02-13 ENCOUNTER — TELEPHONE (OUTPATIENT)
Dept: PAIN MEDICINE | Facility: HOSPITAL | Age: 75
End: 2025-02-13
Payer: MEDICARE

## 2025-02-13 NOTE — TELEPHONE ENCOUNTER
Pre procedure pain management phone call: Spoke to patient instructed on arrival time, location and to bring a

## 2025-02-14 NOTE — DISCHARGE INSTRUCTIONS
[facility] - Post-procedure Instructions              --  While there are no absolute restrictions, it is recommended that you do not perform strenuous activity today. In the morning, you may resume your level of activity as before your block.    --  If you have a band-aid at your injection site, please remove it later today. Observe the area for any redness, swelling, pus-like drainage, or a temperature over 101°. If any of these symptoms occur, please call your doctor at [phone]. If after office hours, leave a message and the on-call provider will return your call.    --  Ice may be applied to your injection site. It is recommended you avoid direct heat (heating pad; hot tub) for 1-2 days.    --  Call [facility] at [phone] if you experience persistent headache, persistent bleeding from the injection site, or severe pain not relieved by heat or oral medication.    --  Do not make important decisions today.    --  Due to the effects of the block and/or the Sedation, DO NOT drive or operate hazardous machinery for 12 hours.  Local anesthetics may cause numbness after procedure and precautions must be taken with regards to operating equipment as well as with walking, even if ambulating with assistance of another person or with an assistive device.    --  Do not drink alcohol for 12 hours.    -- You may return to work tomorrow, or as directed by your referring doctor.    --  Occasionally you may notice a slight increase in your pain after the procedure. This should start to improve within the next 24-48 hours. Radiofrequency ablation procedure pain may last 3-4 weeks.    --  It may take as long as 3-4 days before you notice a gradual improvement in your pain and/or other symptoms.    -- You may continue to take your prescribed pain medication as needed.    --  Some normal possible side effects of steroid use could include fluid retention, increased blood sugar, dull headache, increased  sweating, increased appetite, mood swings and flushing.    --  Diabetics are recommended to watch their blood glucose level closely for 24-48 hours after the injection.    --  Must stay in PACU for 20 min upon arrival and prove no leg weakness before being discharged.    --  IN THE EVENT OF A LIFE THREATENING EMERGENCY, (CHEST PAIN, BREATHING DIFFICULTIES, PARALYSIS…) YOU SHOULD GO TO YOUR NEAREST EMERGENCY ROOM.    --  You should be contacted by our office within 2-3 days to schedule follow up or next appointment date (if not already scheduled).  If not contacted within 7 days, please call the office at [phone].     If you have even 1 hour of relief, these injections are considered successful.

## 2025-02-18 ENCOUNTER — HOSPITAL ENCOUNTER (OUTPATIENT)
Dept: GENERAL RADIOLOGY | Facility: HOSPITAL | Age: 75
Discharge: HOME | End: 2025-02-18
Payer: MEDICARE

## 2025-02-18 ENCOUNTER — HOSPITAL ENCOUNTER (OUTPATIENT)
Dept: PAIN MEDICINE | Facility: HOSPITAL | Age: 75
Discharge: HOME | End: 2025-02-18
Payer: MEDICARE

## 2025-02-18 VITALS
DIASTOLIC BLOOD PRESSURE: 116 MMHG | HEART RATE: 88 BPM | OXYGEN SATURATION: 97 % | SYSTOLIC BLOOD PRESSURE: 161 MMHG | RESPIRATION RATE: 18 BRPM | TEMPERATURE: 98 F

## 2025-02-18 DIAGNOSIS — G89.29 CHRONIC BILATERAL LOW BACK PAIN WITHOUT SCIATICA: ICD-10-CM

## 2025-02-18 DIAGNOSIS — R52 PAIN: ICD-10-CM

## 2025-02-18 DIAGNOSIS — M54.50 CHRONIC BILATERAL LOW BACK PAIN WITHOUT SCIATICA: ICD-10-CM

## 2025-02-18 DIAGNOSIS — M47.816 FACET ARTHRITIS, DEGENERATIVE, LUMBAR SPINE: ICD-10-CM

## 2025-02-18 DIAGNOSIS — M51.362 DEGENERATION OF INTERVERTEBRAL DISC OF LUMBAR REGION WITH DISCOGENIC BACK PAIN AND LOWER EXTREMITY PAIN: ICD-10-CM

## 2025-02-18 LAB — GLUCOSE BLDC GLUCOMTR-MCNC: 98 MG/DL (ref 70–130)

## 2025-02-18 PROCEDURE — 77002 NEEDLE LOCALIZATION BY XRAY: CPT

## 2025-02-18 PROCEDURE — 64494 INJ PARAVERT F JNT L/S 2 LEV: CPT | Performed by: ANESTHESIOLOGY

## 2025-02-18 PROCEDURE — 25010000002 BUPIVACAINE (PF) 0.25 % SOLUTION: Performed by: ANESTHESIOLOGY

## 2025-02-18 PROCEDURE — C1755 CATHETER, INTRASPINAL: HCPCS

## 2025-02-18 PROCEDURE — 25010000002 LIDOCAINE PF 1% 1 % SOLUTION: Performed by: ANESTHESIOLOGY

## 2025-02-18 PROCEDURE — 64493 INJ PARAVERT F JNT L/S 1 LEV: CPT | Performed by: ANESTHESIOLOGY

## 2025-02-18 PROCEDURE — 82948 REAGENT STRIP/BLOOD GLUCOSE: CPT

## 2025-02-18 RX ORDER — IBUPROFEN 200 MG
400 TABLET ORAL EVERY 6 HOURS PRN
COMMUNITY

## 2025-02-18 RX ORDER — BUPIVACAINE HYDROCHLORIDE 2.5 MG/ML
10 INJECTION, SOLUTION EPIDURAL; INFILTRATION; INTRACAUDAL ONCE
Status: COMPLETED | OUTPATIENT
Start: 2025-02-18 | End: 2025-02-18

## 2025-02-18 RX ORDER — LIDOCAINE HYDROCHLORIDE 10 MG/ML
5 INJECTION, SOLUTION EPIDURAL; INFILTRATION; INTRACAUDAL; PERINEURAL ONCE
Status: COMPLETED | OUTPATIENT
Start: 2025-02-18 | End: 2025-02-18

## 2025-02-18 RX ORDER — ACETAMINOPHEN 500 MG
1000 TABLET ORAL EVERY 6 HOURS PRN
COMMUNITY

## 2025-02-18 RX ADMIN — LIDOCAINE HYDROCHLORIDE 5 ML: 10 INJECTION, SOLUTION EPIDURAL; INFILTRATION; INTRACAUDAL; PERINEURAL at 10:34

## 2025-02-18 RX ADMIN — BUPIVACAINE HYDROCHLORIDE 10 ML: 2.5 INJECTION, SOLUTION EPIDURAL; INFILTRATION; INTRACAUDAL; PERINEURAL at 10:37

## 2025-02-18 NOTE — PROCEDURES
Procedures    Lumbar Medial Branch Blockade at  Bilateral L4-S1  [facility]      PREOPERATIVE DIAGNOSIS:  Lumbar spondylosis without myelopathy    POSTOPERATIVE DIAGNOSIS:  Lumbar spondylosis without myelopathy    PROCEDURE:   Diagnostic Lumbar Medial Branch Nerve Blockades, with fluoroscopy:  at the L4, L5 transverse processes and the sacral alar groove)   77457-16 -- Lumbar Facet blocks, 1st Level  75540-69 -- Lumbar Facet blocks, 2nd  Level     PRE-PROCEDURE DISCUSSION WITH PATIENT:    Risks and complications were discussed with the patient prior to starting the procedure and informed consent was obtained.      SURGEON:  Kamini Ballesteros MD    REASON FOR PROCEDURE:    The patient complains of pain that seems to have a significant axial component and Tenderness of the affected facet joints on exam under fluoroscopy    SEDATION:  No sedation was used for this procedure  ANESTHETIC:  0.25% bupivacaine  STEROID:  None  TOTAL VOLUME OF SOLUTION:  6ml    DESCRIPTON OF PROCEDURE:  After obtaining informed consent, IV access was not obtained in the preoperative area.   The patient was taken to the operating room.  The patient was placed in the prone position with a pillow under the abdomen. All pressure points were well padded.  Heart rate, blood pressure, and pulse oximeter were monitored.  The patient was monitored and sedated by the RN under my direction. The lumbosacral area was prepped with Chloraprep and draped in a sterile fashion.     AP fluoroscopic image was used to visualize the junction of the right S1 superior articular process with the sacral ala.  The skin and subcutaneous tissue over the area was anesthetized with 1% lidocaine.  A 22-gauge spinal needle was then advanced percutaneously through the anesthetized skin tract under fluoroscopic guidance in a coaxial view to contact periosteum.  After negative aspiration, a volume of 1 mL of the local anesthetic was injected without resistance.  The  image was then optimized to maximize visualization of the junctions of the L4, L% superior articular processes with the transverse processes.  The skin and subcutaneous tissue over the areas was anesthetized with 1% lidocaine.  A 22-gauge spinal needle was then advanced percutaneously through the anesthetized skin tracts under fluoroscopic guidance in a coaxial view to contact periosteum at the target sites.  After negative aspiration, a volume of 1 mL of the local anesthetic  was injected without resistance at each of the target sites.      The same procedure was then performed on the contralateral side in the exact same fashion.        Onset of analgesia was noted.  Vital signs remained stable throughout.      ESTIMATED BLOOD LOSS:  <5 mL  SPECIMENS:  none    COMPLICATIONS:   No complications were noted.    TOLERANCE & DISCHARGE CONDITION:    The patient tolerated the procedure well.  The patient was transported to the recovery area without difficulties.  The patient was discharged to home under the care of family in stable and satisfactory condition.    Pre-procedure pain score: 4/10 at rest, 10/10 with activity  Post-procedure pain score: 0/10    PLAN OF CARE:  The patient was given our standard instruction sheet.  We discussed that Lumbar Medial Branch Blockade is a diagnostic procedure in consideration for radiofrequency ablation if two diagnostic procedures prove to be positive for significant benefit.  An alternative plan could be therapeutic lumbar branch blockades.  The patient is asked to keep an account of pain relief after the procedure today.  The patient will  Return to clinic 1-2 wks.  The patient will resume all medications as per the medication reconciliation sheet.

## 2025-02-18 NOTE — H&P
Joan Block is a 74 y.o.  female who returns today for a scheduled procedure.  The previous clinic note has been reviewed.  There has been no change in the location or quality of the patient's pain.  The pain is currently rated as 4/10 in severity.  The patient is alert at the time of exam and is emotionally appropriate without significant debilities that would disqualify from the procedure.     Planned Procedure: Bilateral lumbar Medial branch blocks at ~L4-S1    Vitals:    02/18/25 0906   BP: (!) 184/87   Pulse: 87   Resp: 16   Temp: 98.1 °F (36.7 °C)   SpO2: 96%       The risks and benefits of the procedure have been discussed with the patient who has elected to proceed.  There are no contraindications to the procedure at this time.  Appropriate consent forms have been signed.  All questions regarding the planned procedure have been addressed.  Please see the separate documentation for details of the interventional procedure.

## 2025-02-19 ENCOUNTER — TELEPHONE (OUTPATIENT)
Dept: INTERNAL MEDICINE | Facility: CLINIC | Age: 75
End: 2025-02-19

## 2025-02-19 ENCOUNTER — OFFICE VISIT (OUTPATIENT)
Dept: INTERNAL MEDICINE | Facility: CLINIC | Age: 75
End: 2025-02-19
Payer: MEDICARE

## 2025-02-19 VITALS
HEART RATE: 88 BPM | RESPIRATION RATE: 16 BRPM | BODY MASS INDEX: 33.05 KG/M2 | DIASTOLIC BLOOD PRESSURE: 86 MMHG | HEIGHT: 62 IN | SYSTOLIC BLOOD PRESSURE: 172 MMHG | WEIGHT: 179.6 LBS | OXYGEN SATURATION: 97 % | TEMPERATURE: 97.8 F

## 2025-02-19 DIAGNOSIS — R73.03 PREDIABETES: ICD-10-CM

## 2025-02-19 DIAGNOSIS — I10 ESSENTIAL HYPERTENSION: Primary | Chronic | ICD-10-CM

## 2025-02-19 DIAGNOSIS — E27.8 ADRENAL MASS 1 CM TO 4 CM IN DIAMETER: ICD-10-CM

## 2025-02-19 DIAGNOSIS — H53.8 BLURRY VISION, BILATERAL: ICD-10-CM

## 2025-02-19 DIAGNOSIS — E78.2 MIXED HYPERLIPIDEMIA: Chronic | ICD-10-CM

## 2025-02-19 DIAGNOSIS — E66.9 OBESITY (BMI 30-39.9): ICD-10-CM

## 2025-02-19 PROBLEM — E11.9 TYPE 2 DIABETES MELLITUS WITHOUT COMPLICATION, WITHOUT LONG-TERM CURRENT USE OF INSULIN: Status: RESOLVED | Noted: 2024-05-22 | Resolved: 2025-02-19

## 2025-02-19 PROCEDURE — 99214 OFFICE O/P EST MOD 30 MIN: CPT | Performed by: INTERNAL MEDICINE

## 2025-02-19 PROCEDURE — 1125F AMNT PAIN NOTED PAIN PRSNT: CPT | Performed by: INTERNAL MEDICINE

## 2025-02-19 PROCEDURE — 3077F SYST BP >= 140 MM HG: CPT | Performed by: INTERNAL MEDICINE

## 2025-02-19 PROCEDURE — G2211 COMPLEX E/M VISIT ADD ON: HCPCS | Performed by: INTERNAL MEDICINE

## 2025-02-19 PROCEDURE — 3044F HG A1C LEVEL LT 7.0%: CPT | Performed by: INTERNAL MEDICINE

## 2025-02-19 PROCEDURE — 1160F RVW MEDS BY RX/DR IN RCRD: CPT | Performed by: INTERNAL MEDICINE

## 2025-02-19 PROCEDURE — 1159F MED LIST DOCD IN RCRD: CPT | Performed by: INTERNAL MEDICINE

## 2025-02-19 PROCEDURE — 3078F DIAST BP <80 MM HG: CPT | Performed by: INTERNAL MEDICINE

## 2025-02-19 RX ORDER — DEXAMETHASONE 1 MG
TABLET ORAL
Qty: 1 TABLET | Refills: 0 | Status: SHIPPED | OUTPATIENT
Start: 2025-02-19

## 2025-02-19 RX ORDER — LISINOPRIL 40 MG/1
40 TABLET ORAL DAILY
Qty: 90 TABLET | Refills: 1 | Status: SHIPPED | OUTPATIENT
Start: 2025-02-19

## 2025-02-19 NOTE — TELEPHONE ENCOUNTER
Caller: Joan Block    Relationship: Self    Best call back number: [phone]      What was the call regarding: PATIENT CALLING WANTED TO MAKE LAB APPOINTMENT     TRIED TO WARM TRANSFER TO OFFICE NO ANSWER     PATIENT WILL BE CALLING BACK DIDN'T WANT A CALLBACK

## 2025-02-19 NOTE — PROGRESS NOTES
Joan Block is a 74 y.o. female, who presents with a chief complaint of   Chief Complaint   Patient presents with    Diabetes     Patient reports she does not have diabetes any longer    Hypertension     187/116 yesterday (nerve blocks yesterday) patient reports blurred visions, uneasy on the feet, balance is unstable. Patient reports symptoms have worsened over the last 2 weeks (blurred vision).            HPI   Pt here for f/u.  She normally sees dr. Jenkins and is a new patient to me    Pt had high bp yesterday of 187/116.  She was started on amlodipine 5mg in January by dr. Jenkins.  Pt was previously on lisinopril 40.  The pt stopped her lisinopril and was just taking the amlodipine.  Yesterday she had a nerve block with dr. Ballesteros .  She c/o blurry vision for a couple of weeks and worsening balance.      Last labs were done in January and A1c was 5.8.  bmp and cbc were unremarkable.  Renal function and electrolytes normal.    Prediabetes - she was put on metformin in the past for her diabetes but stopped it after a couple of months bc of intolerance.     MRI back noted incidental bilateral adrenal adenomas <2 cm    The following portions of the patient's history were reviewed and updated as appropriate: allergies, current medications, past family history, past medical history, past social history, past surgical history and problem list.    Allergies: Glimepiride, Mounjaro [tirzepatide], and Metformin    Review of Systems   Constitutional: Negative.    HENT: Negative.     Eyes:  Positive for visual disturbance.   Respiratory: Negative.     Cardiovascular: Negative.    Gastrointestinal: Negative.    Endocrine: Negative.    Genitourinary: Negative.    Musculoskeletal: Negative.    Skin: Negative.    Allergic/Immunologic: Negative.    Neurological: Negative.    Hematological: Negative.    Psychiatric/Behavioral: Negative.     All other systems reviewed and are negative.            Wt Readings from Last 3  Encounters:   02/19/25 81.5 kg (179 lb 9.6 oz)   01/28/25 79.1 kg (174 lb 6.4 oz)   01/20/25 79.7 kg (175 lb 9.6 oz)     Temp Readings from Last 3 Encounters:   02/19/25 97.8 °F (36.6 °C) (Infrared)   02/18/25 98 °F (36.7 °C) (Oral)   01/28/25 97.5 °F (36.4 °C)     BP Readings from Last 3 Encounters:   02/19/25 172/86   02/18/25 (!) 161/116   01/28/25 137/88     Pulse Readings from Last 3 Encounters:   02/19/25 88   02/18/25 88   01/28/25 100     Body mass index is 32.85 kg/m².  SpO2 Readings from Last 3 Encounters:   02/19/25 97%   02/18/25 97%   01/28/25 98%          Physical Exam  Vitals and nursing note reviewed.   Constitutional:       General: She is not in acute distress.     Appearance: She is well-developed.   HENT:      Head: Normocephalic and atraumatic.      Right Ear: External ear normal.      Left Ear: External ear normal.      Nose: Nose normal.   Eyes:      Conjunctiva/sclera: Conjunctivae normal.      Pupils: Pupils are equal, round, and reactive to light.   Cardiovascular:      Rate and Rhythm: Normal rate and regular rhythm.      Heart sounds: Normal heart sounds.   Pulmonary:      Effort: Pulmonary effort is normal. No respiratory distress.      Breath sounds: Normal breath sounds. No wheezing.   Musculoskeletal:         General: Normal range of motion.      Cervical back: Normal range of motion and neck supple.      Comments: Normal gait   Skin:     General: Skin is warm and dry.   Neurological:      Mental Status: She is alert and oriented to person, place, and time.   Psychiatric:         Behavior: Behavior normal.         Thought Content: Thought content normal.         Judgment: Judgment normal.         Results for orders placed or performed during the hospital encounter of 02/18/25   POC Glucose Once    Collection Time: 02/18/25  9:13 AM    Specimen: Blood   Result Value Ref Range    Glucose 98 70 - 130 mg/dL     Result Review :   The following data was reviewed by: Anju Desouza MD on  02/19/2025:  Common labs          5/15/2024    09:50 10/8/2024    11:01 1/20/2025    14:18   Common Labs   Glucose 102  95  91    BUN 17  21  14    Creatinine 0.91  1.07  0.95    Sodium 141  142  142    Potassium 4.9  4.7  4.4    Chloride 104  106  106    Calcium 9.8  9.9  10.0    Albumin 4.2  4.2     Total Bilirubin <0.2  0.3     Alkaline Phosphatase 73  66     AST (SGOT) 15  15     ALT (SGPT) 11  15     WBC 8.43  7.57  9.2    Hemoglobin 13.7  12.9  13.6    Hematocrit 42.4  39.8  43.1    Platelets 319  270  332    Total Cholesterol 218      Triglycerides 93      HDL Cholesterol 42      LDL Cholesterol  159      Hemoglobin A1C 6.00  6.10  5.8      Data reviewed : Radiologic studies mri lumbar spine            Assessment and Plan    Diagnoses and all orders for this visit:    1. Essential hypertension (Primary) - cont amlodipine but add back lisinopril  -     lisinopril (PRINIVIL,ZESTRIL) 40 MG tablet; Take 1 tablet by mouth Daily.  Dispense: 90 tablet; Refill: 1    2. Prediabetes  -     Protein / Creatinine Ratio, Urine - Urine, Clean Catch; Future    3. Mixed hyperlipidemia    4. Obesity (BMI 30-39.9)    5. Adrenal mass 1 cm to 4 cm in diameter  -     Metanephrines, Urine, 24 Hour - Urine, Clean Catch; Future  -     DHEA-Sulfate; Future  -     Metanephrines, Frac. Free, Plasma; Future  -     Catecholamine+VMA, 24-Hr Urine - Urine, Clean Catch; Future  -     dexAMETHasone (DECADRON) 1 MG tablet; Take 1 tab at 11pm on night prior to labs.  Then report for labs to be done at 8am the next morning.  Dispense: 1 tablet; Refill: 0  -     Cortisol - AM; Future  -     Aldosterone; Future  -     Renin Direct Assay; Future  -     Protein / Creatinine Ratio, Urine - Urine, Clean Catch; Future  -     CT Abdomen Pelvis With Contrast; Future    6. Blurry vision, bilateral    Needs ophthalmology exam.  Pt is est with eye doctor and will call for appt                   Outpatient Medications Prior to Visit   Medication Sig Dispense  Refill    acetaminophen (TYLENOL) 500 MG tablet Take 2 tablets by mouth Every 6 (Six) Hours As Needed for Mild Pain.      amLODIPine (NORVASC) 5 MG tablet Take 1 tablet by mouth Daily. 90 tablet 1    ibuprofen (ADVIL,MOTRIN) 200 MG tablet Take 2 tablets by mouth Every 6 (Six) Hours As Needed for Mild Pain.      cyclobenzaprine (FLEXERIL) 5 MG tablet Take 1 tablet by mouth 3 (Three) Times a Day As Needed for Muscle Spasms.       No facility-administered medications prior to visit.     New Medications Ordered This Visit   Medications    lisinopril (PRINIVIL,ZESTRIL) 40 MG tablet     Sig: Take 1 tablet by mouth Daily.     Dispense:  90 tablet     Refill:  1    dexAMETHasone (DECADRON) 1 MG tablet     Sig: Take 1 tab at 11pm on night prior to labs.  Then report for labs to be done at 8am the next morning.     Dispense:  1 tablet     Refill:  0     [unfilled]  Medications Discontinued During This Encounter   Medication Reason    cyclobenzaprine (FLEXERIL) 5 MG tablet *Therapy completed         No follow-ups on file.    Patient was given instructions and counseling regarding her condition or for health maintenance advice. Please see specific information pulled into the AVS if appropriate.

## 2025-02-20 ENCOUNTER — TELEPHONE (OUTPATIENT)
Dept: PAIN MEDICINE | Facility: HOSPITAL | Age: 75
End: 2025-02-20
Payer: MEDICARE

## 2025-02-20 NOTE — TELEPHONE ENCOUNTER
Follow up post pain injection medial branch block: Spoke to patient who stated the block was wonderful and is very happy. She had relief all day Tuesday. Stated sites with no complications noted.

## 2025-02-20 NOTE — TELEPHONE ENCOUNTER
[facility] staff attempted to follow warm transfer process and was unsuccessful     Caller: Joan Block    Relationship to patient: Self    Best call back number: [phone]     Patient is needing: TO SCHEDULE LAB APPOINTMENT     PATIENT WOULD LIKE A CALLBACK

## 2025-02-27 ENCOUNTER — OFFICE VISIT (OUTPATIENT)
Dept: PAIN MEDICINE | Facility: CLINIC | Age: 75
End: 2025-02-27
Payer: MEDICARE

## 2025-02-27 VITALS
DIASTOLIC BLOOD PRESSURE: 76 MMHG | RESPIRATION RATE: 18 BRPM | TEMPERATURE: 98.4 F | SYSTOLIC BLOOD PRESSURE: 140 MMHG | HEIGHT: 62 IN | WEIGHT: 176.8 LBS | HEART RATE: 76 BPM | OXYGEN SATURATION: 97 % | BODY MASS INDEX: 32.54 KG/M2

## 2025-02-27 DIAGNOSIS — M48.061 LUMBAR FORAMINAL STENOSIS: ICD-10-CM

## 2025-02-27 DIAGNOSIS — M51.362 DEGENERATION OF INTERVERTEBRAL DISC OF LUMBAR REGION WITH DISCOGENIC BACK PAIN AND LOWER EXTREMITY PAIN: Primary | ICD-10-CM

## 2025-02-27 DIAGNOSIS — G89.29 CHRONIC LEFT SI JOINT PAIN: ICD-10-CM

## 2025-02-27 DIAGNOSIS — M54.50 CHRONIC BILATERAL LOW BACK PAIN WITHOUT SCIATICA: ICD-10-CM

## 2025-02-27 DIAGNOSIS — M54.16 LUMBAR RADICULOPATHY: ICD-10-CM

## 2025-02-27 DIAGNOSIS — M53.3 CHRONIC LEFT SI JOINT PAIN: ICD-10-CM

## 2025-02-27 DIAGNOSIS — M47.816 FACET ARTHRITIS, DEGENERATIVE, LUMBAR SPINE: ICD-10-CM

## 2025-02-27 DIAGNOSIS — G89.29 CHRONIC BILATERAL LOW BACK PAIN WITHOUT SCIATICA: ICD-10-CM

## 2025-02-27 PROCEDURE — 1160F RVW MEDS BY RX/DR IN RCRD: CPT

## 2025-02-27 PROCEDURE — 99214 OFFICE O/P EST MOD 30 MIN: CPT

## 2025-02-27 PROCEDURE — 3077F SYST BP >= 140 MM HG: CPT

## 2025-02-27 PROCEDURE — 1125F AMNT PAIN NOTED PAIN PRSNT: CPT

## 2025-02-27 PROCEDURE — 3078F DIAST BP <80 MM HG: CPT

## 2025-02-27 PROCEDURE — 1159F MED LIST DOCD IN RCRD: CPT

## 2025-02-27 NOTE — PROGRESS NOTES
CHIEF COMPLAINT  Back pain     Subjective   Joan Block is a 74 y.o. female  who presents to the office for follow-up of procedure.  She completed a Bilateral L4-S1 MBB on 2/18/2025 performed by Dr. Ballesteros for management of back pain. Patient reports 100% relief from the procedure for 8 hours. Following the procedure, she was able to sweep and mop, climb stairs, and  kitchen for a extended period of time while making cookies without experiencing increased pain.     Today pain is 3/10VAS in severity (severity in pain varies based on activity level). She continues to complain of axial low back pain that radiates into bilateral hips, left side worse than right. Right leg pain has improved since her LESI. Describes this pain as a nearly continuous ache. Pain is worsened by prolonged standing/standing, walking long distances, and bending. Pain improves with rest/reposition, use of a heating pad, and medications. She utilizes tonic water, magnesium supplements, and alternates OTC Tylenol or Ibuprofen PRN.      She has completed physical, chiropractic, and red light therapy in the past with no relief.      She is followed by Dr. Norman Brown with orthopedics. She received a Left GTB injection in October that offered no relief.      Procedures:  2/18/25 - Bilateral L4-S1 MBB - 100% relief x 8 hours   1/14/25 - L4/L5 LESI - 100% relief to sciatic pain    Back Pain  This is a chronic problem. The current episode started more than 1 year ago. The problem occurs constantly. The problem is unchanged. The pain is present in the lumbar spine. The quality of the pain is described as aching. The pain radiates to the right thigh and right foot (right lateral thigh). The pain is at a severity of 3/10. The pain is moderate. The symptoms are aggravated by standing, sitting, bending, position and twisting. Pertinent negatives include no numbness or weakness. She has tried heat, muscle relaxant, analgesics and NSAIDs for the  "symptoms.     PEG Assessment   What number best describes your pain on average in the past week?8  What number best describes how, during the past week, pain has interfered with your enjoyment of life?10  What number best describes how, during the past week, pain has interfered with your general activity?  7    Review of Pertinent Medical Data ---      The following portions of the patient's history were reviewed and updated as appropriate: allergies, current medications, past family history, past medical history, past social history, past surgical history, and problem list.    Review of Systems   Gastrointestinal:  Positive for diarrhea. Negative for constipation.   Genitourinary:  Negative for difficulty urinating.   Musculoskeletal:  Positive for back pain.   Neurological:  Negative for weakness and numbness.   Psychiatric/Behavioral:  Positive for sleep disturbance. Negative for suicidal ideas. The patient is nervous/anxious.      I have reviewed and confirmed the accuracy of the ROS as documented by the MA/LPN/RN TRU More[facility]     Vitals:    02/27/25 1359   BP: 140/76   Pulse: 76   Resp: 18   Temp: 98.4 °F (36.9 °C)   SpO2: 97%   Weight: 80.2 kg (176 lb 12.8 oz)   Height: 157.5 cm (62\")   PainSc: 3    PainLoc: Back     Objective   Physical Exam  Constitutional:       Appearance: Normal appearance.   HENT:      Head: Normocephalic.   Cardiovascular:      Rate and Rhythm: Normal rate and regular rhythm.   Pulmonary:      Effort: Pulmonary effort is normal.      Breath sounds: Normal breath sounds.   Musculoskeletal:         General: Normal range of motion.      Cervical back: Normal range of motion.      Lumbar back: Tenderness and bony tenderness present. Decreased range of motion. Positive right straight leg raise test and positive left straight leg raise test.      Comments: Slight pain with Left ANTOINE's test  + Left SI joint tenderness   + lumbar facet loading/tenderness   Skin:     General: Skin is " "warm and dry.      Capillary Refill: Capillary refill takes less than 2 seconds.   Neurological:      General: No focal deficit present.      Mental Status: She is alert and oriented to person, place, and time.   Psychiatric:         Mood and Affect: Mood normal.         Behavior: Behavior normal.         Thought Content: Thought content normal.         Cognition and Memory: Cognition normal.       Assessment & Plan   Diagnoses and all orders for this visit:    1. Degeneration of intervertebral disc of lumbar region with discogenic back pain and lower extremity pain (Primary)    2. Chronic bilateral low back pain without sciatica    3. Facet arthritis, degenerative, lumbar spine    4. Chronic left SI joint pain    5. Lumbar radiculopathy    6. Lumbar foraminal stenosis      Joan Block reports a pain score of 3.  Given her pain assessment as noted, treatment options were discussed and the following options were decided upon as a follow-up plan to address the patient's pain: continuation of current treatment plan for pain.    --- Proceed with Bilateral L4-S1 MBB - scheduled on 3/18/25 with Dr. Ballesteros  -------  Education about Medial Branch Blockade and RF Therapy:  This medial branch blockade (MBB) suggested is intended for diagnostic purposes, with the intent of offering the patient Radiofrequency thermal rhizotomy (RF) if the MBB is diagnostically effective.  The diagnostic blockade is necessary to determine the likelihood that RF therapy could be efficacious in providing long term relief to the patient.    Medial branches are sensory nerve branches that connect to a facet joint and transmit sensations & pain signals from that joint.  Facet is a term for the type of joints found in the spine.  Medial branches are the nerves that go to a facet, and therefore are also sometimes called \"facet joint nerves\" (FJNs).      In a medial branch blockade procedure, xray fluoroscopy is used to verify the locations of the " outside of the joint lines which are being targeted.  Under xray guidance, needles are placed to these areas.  Contrast dye is injected to confirm proper placement, with dye flowing over the joint area, and to ensure that the dye does not flow into unintended areas such as a vein.  When this is confirmed, local anesthetic is injected to block the medial branch at that joint level.      If MBBs are diagnostically successful in blocking pain, then the patient is most likely a great candidate for Radiofrequency of those facet joint nerves.  In the RF procedure, needles are placed to the joint lines in the same fashion, and after testing, the needle tips are heated to thermally treat the nerves, blocking the nerves by in essence damaging the nerves with the heat treatment(non-pulsed).       Medically, a successful RF procedure should provide a patient with 50% pain relief or more for at least 6 months.  Clinical experience suggests that successful patients receive relief more in the range of 12 months on average.  We also discussed that a fortunate minority of patients receive therapeutic success from the MBB, and may not require RF ablation.  If a patient receives more than 8 weeks of relief from MBB, then occasional repeat MBB for therapeutic purposes is a very reasonable alternative therapy.  This course of therapy is consistent with our LCDs according to our CMS  in the area, and therefore other insurance providers should follow accordingly.  We will monitor our patients to screen for these therapeutic responders and will offer RF therapy only when necessary.      We discussed that MBB & RF are not without risks.  Guidelines regarding anticoagulant use & neuraxial procedures will be respected.  Patients that are ill or otherwise may be at risk for sepsis will not have their spines accessed by neuraxial injections of any type.  This patient will not be offered these therapies if there is an increased  risk.   We discussed that there is a risk of postprocedural pain and also a risk of worsening of clinical picture with these procedures as with any neuraxial procedure.    -------  --- Follow-up for procedure - scheduled on 3/25/25    Diagnostic Facet Joint Procedure:   JUSTEN   The confirmatory diagnostic facet joint procedure is considered medically reasonable and necessary for the diagnosis and treatment of chronic pain for this patient due to the patient meeting all of the following criteria:    - 1. Moderate to severe chronic neck or low back pain, predominantly axial, that causes functional deficit measured on pain or disability scale.  - 2. Pain present for minimum of 3 months with documented failure to respond to noninvasive conservative management (as tolerated)  - 3. Absence of untreated radiculopathy or neurogenic claudication (except for radiculopathy caused by facet joint synovial cyst)  - 4. There is no non-facet pathology per clinical assessment or radiology studies that could explain the source of the patient’s pain, including but not limited to fracture, tumor, infection, or significant deformity.    The patient has also shown a consistent positive response of at least 80% relief of primary (index) pain (with the duration of relief being consistent with the agent used).      GIANA REPORT  As the clinician, I personally reviewed the GIANA from 2/27/25 while the patient was in the office today.    Dictated utilizing Dragon dictation.

## 2025-03-06 ENCOUNTER — HOSPITAL ENCOUNTER (OUTPATIENT)
Dept: CT IMAGING | Facility: HOSPITAL | Age: 75
Discharge: HOME | End: 2025-03-06
Admitting: INTERNAL MEDICINE
Payer: MEDICARE

## 2025-03-06 DIAGNOSIS — E27.8 ADRENAL MASS 1 CM TO 4 CM IN DIAMETER: ICD-10-CM

## 2025-03-06 PROCEDURE — 25510000001 IOPAMIDOL PER 1 ML: Performed by: INTERNAL MEDICINE

## 2025-03-06 PROCEDURE — 74178 CT ABD&PLV WO CNTR FLWD CNTR: CPT

## 2025-03-06 RX ORDER — IOPAMIDOL 755 MG/ML
100 INJECTION, SOLUTION INTRAVASCULAR
Status: COMPLETED | OUTPATIENT
Start: 2025-03-06 | End: 2025-03-06

## 2025-03-06 RX ADMIN — IOPAMIDOL 100 ML: 755 INJECTION, SOLUTION INTRAVENOUS at 13:05

## 2025-03-14 NOTE — DISCHARGE INSTRUCTIONS
[facility] [place] [facility] - Post-procedure Instructions              --  While there are no absolute restrictions, it is recommended that you do not perform strenuous activity today. In the morning, you may resume your level of activity as before your block.    --  If you have a band-aid at your injection site, please remove it later today. Observe the area for any redness, swelling, pus-like drainage, or a temperature over 101°. If any of these symptoms occur, please call your doctor at [phone]. If after office hours, leave a message and the on-call provider will return your call.    --  Ice may be applied to your injection site. It is recommended you avoid direct heat (heating pad; hot tub) for 1-2 days.    --  Call [facility] [place] at [phone] if you experience persistent headache, persistent bleeding from the injection site, or severe pain not relieved by heat or oral medication.    --  Do not make important decisions today.    --  Due to the effects of the block and/or the Sedation, DO NOT drive or operate hazardous machinery for 12 hours.  Local anesthetics may cause numbness after procedure and precautions must be taken with regards to operating equipment as well as with walking, even if ambulating with assistance of another person or with an assistive device.    --  Do not drink alcohol for 12 hours.    -- You may return to work tomorrow, or as directed by your referring doctor.    --  Occasionally you may notice a slight increase in your pain after the procedure. This should start to improve within the next 24-48 hours. Radiofrequency ablation procedure pain may last 3-4 weeks.    --  It may take as long as 3-4 days before you notice a gradual improvement in your pain and/or other symptoms.    -- You may continue to take your prescribed pain medication as needed.    --  Some normal possible side effects of steroid use could include fluid retention, increased blood sugar, dull headache, increased  sweating, increased appetite, mood swings and flushing.    --  Diabetics are recommended to watch their blood glucose level closely for 24-48 hours after the injection.    --  Must stay in PACU for 20 min upon arrival and prove no leg weakness before being discharged.    --  IN THE EVENT OF A LIFE THREATENING EMERGENCY, (CHEST PAIN, BREATHING DIFFICULTIES, PARALYSIS…) YOU SHOULD GO TO YOUR NEAREST EMERGENCY ROOM.    --  You should be contacted by our office within 2-3 days to schedule follow up or next appointment date (if not already scheduled).  If not contacted within 7 days, please call the office at [phone].     If you have even 1 hour of relief, these injections are considered successful.

## 2025-03-18 ENCOUNTER — HOSPITAL ENCOUNTER (OUTPATIENT)
Dept: GENERAL RADIOLOGY | Facility: HOSPITAL | Age: 75
Discharge: HOME | End: 2025-03-18
Payer: MEDICARE

## 2025-03-18 ENCOUNTER — HOSPITAL ENCOUNTER (OUTPATIENT)
Dept: PAIN MEDICINE | Facility: HOSPITAL | Age: 75
Discharge: HOME | End: 2025-03-18
Payer: MEDICARE

## 2025-03-18 VITALS
RESPIRATION RATE: 16 BRPM | HEIGHT: 62 IN | BODY MASS INDEX: 32.76 KG/M2 | SYSTOLIC BLOOD PRESSURE: 145 MMHG | TEMPERATURE: 98.7 F | DIASTOLIC BLOOD PRESSURE: 77 MMHG | OXYGEN SATURATION: 96 % | HEART RATE: 101 BPM | WEIGHT: 178 LBS

## 2025-03-18 DIAGNOSIS — R52 PAIN: ICD-10-CM

## 2025-03-18 DIAGNOSIS — M47.816 FACET ARTHRITIS, DEGENERATIVE, LUMBAR SPINE: ICD-10-CM

## 2025-03-18 DIAGNOSIS — G89.29 CHRONIC BILATERAL LOW BACK PAIN WITHOUT SCIATICA: ICD-10-CM

## 2025-03-18 DIAGNOSIS — M54.50 CHRONIC BILATERAL LOW BACK PAIN WITHOUT SCIATICA: ICD-10-CM

## 2025-03-18 DIAGNOSIS — M51.362 DEGENERATION OF INTERVERTEBRAL DISC OF LUMBAR REGION WITH DISCOGENIC BACK PAIN AND LOWER EXTREMITY PAIN: ICD-10-CM

## 2025-03-18 LAB — GLUCOSE BLDC GLUCOMTR-MCNC: 109 MG/DL (ref 70–130)

## 2025-03-18 PROCEDURE — 82948 REAGENT STRIP/BLOOD GLUCOSE: CPT

## 2025-03-18 PROCEDURE — 64494 INJ PARAVERT F JNT L/S 2 LEV: CPT | Performed by: ANESTHESIOLOGY

## 2025-03-18 PROCEDURE — 64493 INJ PARAVERT F JNT L/S 1 LEV: CPT | Performed by: ANESTHESIOLOGY

## 2025-03-18 PROCEDURE — C1755 CATHETER, INTRASPINAL: HCPCS

## 2025-03-18 PROCEDURE — 77002 NEEDLE LOCALIZATION BY XRAY: CPT

## 2025-03-18 PROCEDURE — 25010000002 LIDOCAINE PF 1% 1 % SOLUTION: Performed by: ANESTHESIOLOGY

## 2025-03-18 PROCEDURE — 25010000002 BUPIVACAINE (PF) 0.25 % SOLUTION: Performed by: ANESTHESIOLOGY

## 2025-03-18 RX ORDER — LIDOCAINE HYDROCHLORIDE 10 MG/ML
5 INJECTION, SOLUTION EPIDURAL; INFILTRATION; INTRACAUDAL; PERINEURAL ONCE
Status: COMPLETED | OUTPATIENT
Start: 2025-03-18 | End: 2025-03-18

## 2025-03-18 RX ORDER — BUPIVACAINE HYDROCHLORIDE 2.5 MG/ML
10 INJECTION, SOLUTION EPIDURAL; INFILTRATION; INTRACAUDAL ONCE
Status: COMPLETED | OUTPATIENT
Start: 2025-03-18 | End: 2025-03-18

## 2025-03-18 RX ADMIN — BUPIVACAINE HYDROCHLORIDE 10 ML: 2.5 INJECTION, SOLUTION EPIDURAL; INFILTRATION; INTRACAUDAL; PERINEURAL at 09:30

## 2025-03-18 RX ADMIN — LIDOCAINE HYDROCHLORIDE 5 ML: 10 INJECTION, SOLUTION EPIDURAL; INFILTRATION; INTRACAUDAL; PERINEURAL at 09:30

## 2025-03-18 NOTE — PROCEDURES
Procedures    Lumbar Medial Branch Blockade at  Bilateral L4-S1  [facility]      PREOPERATIVE DIAGNOSIS:  Lumbar spondylosis without myelopathy    POSTOPERATIVE DIAGNOSIS:  Lumbar spondylosis without myelopathy    PROCEDURE:   Diagnostic Lumbar Medial Branch Nerve Blockades, with fluoroscopy:  at the L4, L5 transverse processes and the sacral alar groove)   66862-70 -- Lumbar Facet blocks, 1st Level  54703-99 -- Lumbar Facet blocks, 2nd  Level    PRE-PROCEDURE DISCUSSION WITH PATIENT:    Risks and complications were discussed with the patient prior to starting the procedure and informed consent was obtained.      SURGEON:  Kamini Ballesteros MD    REASON FOR PROCEDURE:    The patient complains of pain that seems to have a significant axial component and Previous diagnostic positivity of a Lumbar Medial Branch Blockade at the same levels    SEDATION:  No sedation was used for this procedure  ANESTHETIC:  0.25% bupivacaine  STEROID:  None  TOTAL VOLUME OF SOLUTION:  6ml    DESCRIPTON OF PROCEDURE:  After obtaining informed consent, IV access was not obtained in the preoperative area.   The patient was taken to the operating room.  The patient was placed in the prone position with a pillow under the abdomen. All pressure points were well padded.  Heart rate, blood pressure, and pulse oximeter were monitored.  The patient was monitored and sedated by the RN under my direction. The lumbosacral area was prepped with Chloraprep and draped in a sterile fashion.     AP fluoroscopic image was used to visualize the junction of the right S1 superior articular process with the sacral ala.  The skin and subcutaneous tissue over the area was anesthetized with 1% lidocaine.  A 22-gauge spinal needle was then advanced percutaneously through the anesthetized skin tract under fluoroscopic guidance in a coaxial view to contact periosteum.  After negative aspiration, a volume of 1 mL of the local anesthetic was injected without  resistance.  The image was then optimized to maximize visualization of the junctions of the L4, L5 superior articular processes with the transverse processes.  The skin and subcutaneous tissue over the areas was anesthetized with 1% lidocaine.  A 22-gauge spinal needle was then advanced percutaneously through the anesthetized skin tracts under fluoroscopic guidance in a coaxial view to contact periosteum at the target sites.  After negative aspiration, a volume of 1 mL of the local anesthetic  was injected without resistance at each of the target sites.      The same procedure was then performed on the contralateral side in the exact same fashion.        Onset of analgesia was noted.  Vital signs remained stable throughout.      ESTIMATED BLOOD LOSS:  <5 mL  SPECIMENS:  none    COMPLICATIONS:   No complications were noted.    TOLERANCE & DISCHARGE CONDITION:    The patient tolerated the procedure well.  The patient was transported to the recovery area without difficulties.  The patient was discharged to home under the care of family in stable and satisfactory condition.    Pre-procedure pain score: 0/10 at rest, 6/10 with activity  Post-procedure pain score: 1/10    PLAN OF CARE:  The patient was given our standard instruction sheet.  We discussed that Lumbar Medial Branch Blockade is a diagnostic procedure in consideration for radiofrequency ablation if two diagnostic procedures prove to be positive for significant benefit.  An alternative plan could be therapeutic lumbar branch blockades.  The patient is asked to keep an account of pain relief after the procedure today.  The patient will  Return to clinic 1-2 wks.  The patient will resume all medications as per the medication reconciliation sheet.

## 2025-03-18 NOTE — H&P
Joan Block is a 74 y.o.  female who returns today for a scheduled procedure.  The previous clinic note has been reviewed.  There has been no change in the location or quality of the patient's pain.  The pain is currently rated as 6/10 in severity.  The patient is alert at the time of exam and is emotionally appropriate without significant debilities that would disqualify from the procedure.     Planned Procedure: Lumbar Medial branch blocks at bilateral L4-S1    Vitals:    03/18/25 0834   BP: 162/82   Pulse: 84   Resp: 18   Temp: 98.7 °F (37.1 °C)   SpO2: 96%       The risks and benefits of the procedure have been discussed with the patient who has elected to proceed.  There are no contraindications to the procedure at this time.  Appropriate consent forms have been signed.  All questions regarding the planned procedure have been addressed.  Please see the separate documentation for details of the interventional procedure.

## 2025-03-20 ENCOUNTER — TELEPHONE (OUTPATIENT)
Dept: PAIN MEDICINE | Facility: HOSPITAL | Age: 75
End: 2025-03-20
Payer: MEDICARE

## 2025-03-20 NOTE — TELEPHONE ENCOUNTER
Post procedure medial branch block follow up: spoke to patient who stated had pain relief until 1700 hours on Tuesday, able to walk in [employer] afterwards with no issues. Stated sites are good with no issues

## 2025-03-25 ENCOUNTER — PREP FOR SURGERY (OUTPATIENT)
Dept: SURGERY | Facility: SURGERY CENTER | Age: 75
End: 2025-03-25
Payer: MEDICARE

## 2025-03-25 ENCOUNTER — TRANSCRIBE ORDERS (OUTPATIENT)
Dept: SURGERY | Facility: SURGERY CENTER | Age: 75
End: 2025-03-25
Payer: MEDICARE

## 2025-03-25 ENCOUNTER — OFFICE VISIT (OUTPATIENT)
Age: 75
End: 2025-03-25
Payer: MEDICARE

## 2025-03-25 VITALS
TEMPERATURE: 97.8 F | HEIGHT: 62 IN | BODY MASS INDEX: 33.31 KG/M2 | DIASTOLIC BLOOD PRESSURE: 84 MMHG | SYSTOLIC BLOOD PRESSURE: 160 MMHG | WEIGHT: 181 LBS | OXYGEN SATURATION: 92 % | HEART RATE: 94 BPM

## 2025-03-25 DIAGNOSIS — M51.362 DEGENERATION OF INTERVERTEBRAL DISC OF LUMBAR REGION WITH DISCOGENIC BACK PAIN AND LOWER EXTREMITY PAIN: Primary | ICD-10-CM

## 2025-03-25 DIAGNOSIS — M54.50 CHRONIC BILATERAL LOW BACK PAIN WITHOUT SCIATICA: ICD-10-CM

## 2025-03-25 DIAGNOSIS — G89.29 CHRONIC BILATERAL LOW BACK PAIN WITHOUT SCIATICA: ICD-10-CM

## 2025-03-25 DIAGNOSIS — G89.29 CHRONIC LEFT SI JOINT PAIN: ICD-10-CM

## 2025-03-25 DIAGNOSIS — M53.3 CHRONIC LEFT SI JOINT PAIN: ICD-10-CM

## 2025-03-25 DIAGNOSIS — M47.816 FACET ARTHRITIS, DEGENERATIVE, LUMBAR SPINE: ICD-10-CM

## 2025-03-25 DIAGNOSIS — Z41.9 SURGERY, ELECTIVE: Primary | ICD-10-CM

## 2025-03-25 DIAGNOSIS — M54.16 LUMBAR RADICULOPATHY: ICD-10-CM

## 2025-03-25 PROCEDURE — 3077F SYST BP >= 140 MM HG: CPT

## 2025-03-25 PROCEDURE — 1159F MED LIST DOCD IN RCRD: CPT

## 2025-03-25 PROCEDURE — 1125F AMNT PAIN NOTED PAIN PRSNT: CPT

## 2025-03-25 PROCEDURE — 99214 OFFICE O/P EST MOD 30 MIN: CPT

## 2025-03-25 PROCEDURE — 3079F DIAST BP 80-89 MM HG: CPT

## 2025-03-25 PROCEDURE — 1160F RVW MEDS BY RX/DR IN RCRD: CPT

## 2025-03-25 RX ORDER — SODIUM CHLORIDE 0.9 % (FLUSH) 0.9 %
10 SYRINGE (ML) INJECTION EVERY 12 HOURS SCHEDULED
OUTPATIENT
Start: 2025-03-25

## 2025-03-25 RX ORDER — SODIUM CHLORIDE 0.9 % (FLUSH) 0.9 %
10 SYRINGE (ML) INJECTION AS NEEDED
OUTPATIENT
Start: 2025-03-25

## 2025-03-25 NOTE — H&P (VIEW-ONLY)
CHIEF COMPLAINT  Back pain    Subjective   Joan Block is a 74 y.o. female  who presents to the office for follow-up of procedure.  She completed a Bilateral L4-S1 MBB on 3/18/25 performed by Dr. Ballesteros for management of low back pain. Patient reports 100% relief from the procedure for 8 hours. She reports that after the procedure she was able to go to [employer] to shop and bend over to  after her dog without experiencing increased pain.     Today pain is 3/10VAS in severity (severity in pain varies based on activity level). She continues to complain of axial low back pain that radiates into bilateral hips, left side worse than right. Denies radicular pain at this time. Describes this pain as a nearly continuous ache. Pain is worsened by prolonged standing/standing, walking long distances, and bending. Pain improves with rest/reposition, use of a heating pad, and medications. She utilizes magnesium supplements and alternates OTC Tylenol or Ibuprofen PRN.      She has completed physical, chiropractic, and red light therapy in the past with no relief.      She is followed by Dr. Norman Brown with orthopedics. She received a Left GTB injection in October that offered no relief.      Procedures:  3/18/25 - Bilateral L4-S1 MBB - 100% relief x 8 hours  2/18/25 - Bilateral L4-S1 MBB - 100% relief x 8 hours   1/14/25 - L4/L5 LESI - 100% relief to sciatic pain    Back Pain  This is a chronic problem. The current episode started more than 1 year ago. The problem occurs constantly. The problem has been comes and goes since onset. The pain is present in the lumbar spine. The quality of the pain is described as aching. The pain radiates to the right thigh and right foot (right lateral thigh). The pain is at a severity of 3/10. The pain is moderate. The symptoms are aggravated by standing, sitting, bending, position and twisting. Pertinent negatives include no abdominal pain, chest pain, dysuria, fever, headaches,  "numbness or weakness. She has tried heat, muscle relaxant, analgesics and NSAIDs for the symptoms.      PEG Assessment   What number best describes your pain on average in the past week?5  What number best describes how, during the past week, pain has interfered with your enjoyment of life?5  What number best describes how, during the past week, pain has interfered with your general activity?  7    Review of Pertinent Medical Data ---      The following portions of the patient's history were reviewed and updated as appropriate: allergies, current medications, past family history, past medical history, past social history, past surgical history, and problem list.    Review of Systems   Constitutional:  Positive for activity change (decreased). Negative for chills, fatigue and fever.   HENT:  Negative for congestion.    Eyes:  Negative for visual disturbance.   Respiratory:  Negative for chest tightness and shortness of breath.    Cardiovascular:  Negative for chest pain.   Gastrointestinal:  Negative for abdominal pain, constipation and diarrhea.   Genitourinary:  Negative for difficulty urinating, dyspareunia and dysuria.   Musculoskeletal:  Positive for back pain.   Neurological:  Negative for dizziness, weakness, light-headedness, numbness and headaches.   Psychiatric/Behavioral:  Negative for agitation, self-injury, sleep disturbance and suicidal ideas. The patient is not nervous/anxious.      I have reviewed and confirmed the accuracy of the ROS as documented by the MA/LPN/RN TRU More[facility]     Vitals:    03/25/25 1123   BP: 160/84   Pulse: 94   Temp: 97.8 °F (36.6 °C)   SpO2: 92%   Weight: 82.1 kg (181 lb)   Height: 157.5 cm (62\")   PainSc: 3    PainLoc: Hip  Comment: left     Objective   Physical Exam  Constitutional:       Appearance: Normal appearance.   HENT:      Head: Normocephalic.   Cardiovascular:      Rate and Rhythm: Normal rate and regular rhythm.   Pulmonary:      Effort: Pulmonary effort " is normal.      Breath sounds: Normal breath sounds.   Musculoskeletal:      Cervical back: Normal range of motion.      Lumbar back: Tenderness and bony tenderness present. Decreased range of motion.      Comments: Slight pain with Left ANTOINE's test  + Left SI joint tenderness   + lumbar facet loading/tenderness   Skin:     General: Skin is warm and dry.      Capillary Refill: Capillary refill takes less than 2 seconds.   Neurological:      General: No focal deficit present.      Mental Status: She is alert and oriented to person, place, and time.   Psychiatric:         Mood and Affect: Mood normal.         Behavior: Behavior normal.         Thought Content: Thought content normal.         Cognition and Memory: Cognition normal.       Assessment & Plan   Diagnoses and all orders for this visit:    1. Degeneration of intervertebral disc of lumbar region with discogenic back pain and lower extremity pain (Primary)    2. Chronic bilateral low back pain without sciatica    3. Facet arthritis, degenerative, lumbar spine    4. Chronic left SI joint pain    5. Lumbar radiculopathy      Joan Block reports a pain score of 3.  Given her pain assessment as noted, treatment options were discussed and the following options were decided upon as a follow-up plan to address the patient's pain: continuation of current treatment plan for pain.    --- Bilateral L4-S1 RFA  -------  Education about Medial Branch Blockade and RF Therapy:    This medial branch blockade (MBB) suggested is intended for diagnostic purposes, with the intent of offering the patient Radiofrequency thermal rhizotomy (RF) if the MBB is diagnostically effective.  The diagnostic blockade is necessary to determine the likelihood that RF therapy could be efficacious in providing long term relief to the patient.    Medial branches are sensory nerve branches that connect to a facet joint and transmit sensations & pain signals from that joint.  Facet is a term  "for the type of joints found in the spine.  Medial branches are the nerves that go to a facet, and therefore are also sometimes called \"facet joint nerves\" (FJNs).      In a medial branch blockade procedure, xray fluoroscopy is used to verify the locations of the outside of the joint lines which are being targeted.  Under xray guidance, needles are placed to these areas.  Contrast dye is injected to confirm proper placement, with dye flowing over the joint area, and to ensure that the dye does not flow into unintended areas such as a vein.  When this is confirmed, local anesthetic is injected to block the medial branch at that joint level.      If MBBs are diagnostically successful in blocking pain, then the patient is most likely a great candidate for Radiofrequency of those facet joint nerves.  In the RF procedure, needles are placed to the joint lines in the same fashion, and after testing, the needle tips are heated to thermally treat the nerves, blocking the nerves by in essence damaging the nerves with the heat treatment(non-pulsed).       Medically, a successful RF procedure should provide a patient with 50% pain relief or more for at least 6 months.  Clinical experience suggests that successful patients receive relief more in the range of 12 months on average.  We also discussed that a fortunate minority of patients receive therapeutic success from the MBB, and may not require RF ablation.  If a patient receives more than 8 weeks of relief from MBB, then occasional repeat MBB for therapeutic purposes is a very reasonable alternative therapy.  This course of therapy is consistent with our LCDs according to our CMS  in the area, and therefore other insurance providers should follow accordingly.  We will monitor our patients to screen for these therapeutic responders and will offer RF therapy only when necessary.      We discussed that MBB & RF are not without risks.  Guidelines regarding " anticoagulant use & neuraxial procedures will be respected.  Patients that are ill or otherwise may be at risk for sepsis will not have their spines accessed by neuraxial injections of any type.  This patient will not be offered these therapies if there is an increased risk.   We discussed that there is a risk of postprocedural pain and also a risk of worsening of clinical picture with these procedures as with any neuraxial procedure.    -------  Discussed with the patient that sedation is optional for this procedure.  The sedation offered is called conscious sedation which is different from general anesthesia that is utilized in surgical procedures. The dosing of the sedation is determined by the physician and they will be monitored throughout the procedure. With conscious sedation it is possible to remember parts or all of the procedure, this is normal. They will need to have a  with them as driving is prohibited following conscious sedation.     NPO instructions for conscious sedation:  --- Do not eat 8 hours prior to the procedure.   --- Do not drink any dairy or citrus 4 hours prior to the procedure.   --- Do not drink anything, including clear liquids, 2 hours prior to procedure.     If the NPO instructions are not followed then the procedure may be performed without sedation or the procedure will need to be rescheduled.   --- Follow-up for procedure    Thermal Radiofrequency Destruction  This initial thermal radiofrequency destruction (RFA) of cervical, thoracic, or lumbar paravertebral facet joint (medial branch) nerves is considered medically reasonable and necessary as this patient has met the criteria of having at least two (2) medically reasonable and necessary diagnostic MBBs, with each one providing a consistent minimum of 80% sustained relief of primary (index) pain (with the duration of relief being consistent with the agent used).       GIANA TINOCO  As the clinician, I personally reviewed  the GIANA from 3/25/25 while the patient was in the office today.    Dictated utilizing Dragon dictation.

## 2025-03-25 NOTE — PATIENT INSTRUCTIONS
"-------  Education about Medial Branch Blockade and RF Therapy:    This medial branch blockade (MBB) suggested is intended for diagnostic purposes, with the intent of offering the patient Radiofrequency thermal rhizotomy (RF) if the MBB is diagnostically effective.  The diagnostic blockade is necessary to determine the likelihood that RF therapy could be efficacious in providing long term relief to the patient.    Medial branches are sensory nerve branches that connect to a facet joint and transmit sensations & pain signals from that joint.  Facet is a term for the type of joints found in the spine.  Medial branches are the nerves that go to a facet, and therefore are also sometimes called \"facet joint nerves\" (FJNs).      In a medial branch blockade procedure, xray fluoroscopy is used to verify the locations of the outside of the joint lines which are being targeted.  Under xray guidance, needles are placed to these areas.  Contrast dye is injected to confirm proper placement, with dye flowing over the joint area, and to ensure that the dye does not flow into unintended areas such as a vein.  When this is confirmed, local anesthetic is injected to block the medial branch at that joint level.      If MBBs are diagnostically successful in blocking pain, then the patient is most likely a great candidate for Radiofrequency of those facet joint nerves.  In the RF procedure, needles are placed to the joint lines in the same fashion, and after testing, the needle tips are heated to thermally treat the nerves, blocking the nerves by in essence damaging the nerves with the heat treatment(non-pulsed).       Medically, a successful RF procedure should provide a patient with 50% pain relief or more for at least 6 months.  Clinical experience suggests that successful patients receive relief more in the range of 12 months on average.  We also discussed that a fortunate minority of patients receive therapeutic success from the " MBB, and may not require RF ablation.  If a patient receives more than 8 weeks of relief from MBB, then occasional repeat MBB for therapeutic purposes is a very reasonable alternative therapy.  This course of therapy is consistent with our LCDs according to our CMS  in the area, and therefore other insurance providers should follow accordingly.  We will monitor our patients to screen for these therapeutic responders and will offer RF therapy only when necessary.        We discussed that MBB & RF are not without risks.  Guidelines regarding anticoagulant use & neuraxial procedures will be respected.  Patients that are ill or otherwise may be at risk for sepsis will not have their spines accessed by neuraxial injections of any type.  This patient will not be offered these therapies if there is an increased risk.   We discussed that there is a risk of postprocedural pain and also a risk of worsening of clinical picture with these procedures as with any neuraxial procedure.    -------    Discussed with the patient that sedation is optional for this procedure.  The sedation offered is called conscious sedation which is different from general anesthesia that is utilized in surgical procedures. The dosing of the sedation is determined by the physician and they will be monitored throughout the procedure. With conscious sedation it is possible to remember parts or all of the procedure, this is normal. They will need to have a  with them as driving is prohibited following conscious sedation.     NPO instructions for conscious sedation:  --- Do not eat 8 hours prior to the procedure.   --- Do not drink any dairy or citrus 4 hours prior to the procedure.   --- Do not drink anything, including clear liquids, 2 hours prior to procedure.     If the NPO instructions are not followed then the procedure may be performed without sedation or the procedure will need to be rescheduled.

## 2025-03-25 NOTE — PROGRESS NOTES
CHIEF COMPLAINT  Back pain    Subjective   Joan Block is a 74 y.o. female  who presents to the office for follow-up of procedure.  She completed a Bilateral L4-S1 MBB on 3/18/25 performed by Dr. Ballesteros for management of low back pain. Patient reports 100% relief from the procedure for 8 hours. She reports that after the procedure she was able to go to [place] to shop and bend over to  after her dog without experiencing increased pain.     Today pain is 3/10VAS in severity (severity in pain varies based on activity level). She continues to complain of axial low back pain that radiates into bilateral hips, left side worse than right. Denies radicular pain at this time. Describes this pain as a nearly continuous ache. Pain is worsened by prolonged standing/standing, walking long distances, and bending. Pain improves with rest/reposition, use of a heating pad, and medications. She utilizes magnesium supplements and alternates OTC Tylenol or Ibuprofen PRN.      She has completed physical, chiropractic, and red light therapy in the past with no relief.      She is followed by Dr. Norman Brown with orthopedics. She received a Left GTB injection in October that offered no relief.      Procedures:  3/18/25 - Bilateral L4-S1 MBB - 100% relief x 8 hours  2/18/25 - Bilateral L4-S1 MBB - 100% relief x 8 hours   1/14/25 - L4/L5 LESI - 100% relief to sciatic pain    Back Pain  This is a chronic problem. The current episode started more than 1 year ago. The problem occurs constantly. The problem has been comes and goes since onset. The pain is present in the lumbar spine. The quality of the pain is described as aching. The pain radiates to the right thigh and right foot (right lateral thigh). The pain is at a severity of 3/10. The pain is moderate. The symptoms are aggravated by standing, sitting, bending, position and twisting. Pertinent negatives include no abdominal pain, chest pain, dysuria, fever, headaches,  "numbness or weakness. She has tried heat, muscle relaxant, analgesics and NSAIDs for the symptoms.      PEG Assessment   What number best describes your pain on average in the past week?5  What number best describes how, during the past week, pain has interfered with your enjoyment of life?5  What number best describes how, during the past week, pain has interfered with your general activity?  7    Review of Pertinent Medical Data ---      The following portions of the patient's history were reviewed and updated as appropriate: allergies, current medications, past family history, past medical history, past social history, past surgical history, and problem list.    Review of Systems   Constitutional:  Positive for activity change (decreased). Negative for chills, fatigue and fever.   HENT:  Negative for congestion.    Eyes:  Negative for visual disturbance.   Respiratory:  Negative for chest tightness and shortness of breath.    Cardiovascular:  Negative for chest pain.   Gastrointestinal:  Negative for abdominal pain, constipation and diarrhea.   Genitourinary:  Negative for difficulty urinating, dyspareunia and dysuria.   Musculoskeletal:  Positive for back pain.   Neurological:  Negative for dizziness, weakness, light-headedness, numbness and headaches.   Psychiatric/Behavioral:  Negative for agitation, self-injury, sleep disturbance and suicidal ideas. The patient is not nervous/anxious.      I have reviewed and confirmed the accuracy of the ROS as documented by the MA/LPN/RN TRU Morel     Vitals:    03/25/25 1123   BP: 160/84   Pulse: 94   Temp: 97.8 °F (36.6 °C)   SpO2: 92%   Weight: 82.1 kg (181 lb)   Height: 157.5 cm (62\")   PainSc: 3    PainLoc: Hip  Comment: left     Objective   Physical Exam  Constitutional:       Appearance: Normal appearance.   HENT:      Head: Normocephalic.   Cardiovascular:      Rate and Rhythm: Normal rate and regular rhythm.   Pulmonary:      Effort: Pulmonary effort " is normal.      Breath sounds: Normal breath sounds.   Musculoskeletal:      Cervical back: Normal range of motion.      Lumbar back: Tenderness and bony tenderness present. Decreased range of motion.      Comments: Slight pain with Left ANTOINE's test  + Left SI joint tenderness   + lumbar facet loading/tenderness   Skin:     General: Skin is warm and dry.      Capillary Refill: Capillary refill takes less than 2 seconds.   Neurological:      General: No focal deficit present.      Mental Status: She is alert and oriented to person, place, and time.   Psychiatric:         Mood and Affect: Mood normal.         Behavior: Behavior normal.         Thought Content: Thought content normal.         Cognition and Memory: Cognition normal.       Assessment & Plan   Diagnoses and all orders for this visit:    1. Degeneration of intervertebral disc of lumbar region with discogenic back pain and lower extremity pain (Primary)    2. Chronic bilateral low back pain without sciatica    3. Facet arthritis, degenerative, lumbar spine    4. Chronic left SI joint pain    5. Lumbar radiculopathy      Joan Block reports a pain score of 3.  Given her pain assessment as noted, treatment options were discussed and the following options were decided upon as a follow-up plan to address the patient's pain: continuation of current treatment plan for pain.    --- Bilateral L4-S1 RFA  -------  Education about Medial Branch Blockade and RF Therapy:    This medial branch blockade (MBB) suggested is intended for diagnostic purposes, with the intent of offering the patient Radiofrequency thermal rhizotomy (RF) if the MBB is diagnostically effective.  The diagnostic blockade is necessary to determine the likelihood that RF therapy could be efficacious in providing long term relief to the patient.    Medial branches are sensory nerve branches that connect to a facet joint and transmit sensations & pain signals from that joint.  Facet is a term  "for the type of joints found in the spine.  Medial branches are the nerves that go to a facet, and therefore are also sometimes called \"facet joint nerves\" (FJNs).      In a medial branch blockade procedure, xray fluoroscopy is used to verify the locations of the outside of the joint lines which are being targeted.  Under xray guidance, needles are placed to these areas.  Contrast dye is injected to confirm proper placement, with dye flowing over the joint area, and to ensure that the dye does not flow into unintended areas such as a vein.  When this is confirmed, local anesthetic is injected to block the medial branch at that joint level.      If MBBs are diagnostically successful in blocking pain, then the patient is most likely a great candidate for Radiofrequency of those facet joint nerves.  In the RF procedure, needles are placed to the joint lines in the same fashion, and after testing, the needle tips are heated to thermally treat the nerves, blocking the nerves by in essence damaging the nerves with the heat treatment(non-pulsed).       Medically, a successful RF procedure should provide a patient with 50% pain relief or more for at least 6 months.  Clinical experience suggests that successful patients receive relief more in the range of 12 months on average.  We also discussed that a fortunate minority of patients receive therapeutic success from the MBB, and may not require RF ablation.  If a patient receives more than 8 weeks of relief from MBB, then occasional repeat MBB for therapeutic purposes is a very reasonable alternative therapy.  This course of therapy is consistent with our LCDs according to our CMS  in the area, and therefore other insurance providers should follow accordingly.  We will monitor our patients to screen for these therapeutic responders and will offer RF therapy only when necessary.      We discussed that MBB & RF are not without risks.  Guidelines regarding " anticoagulant use & neuraxial procedures will be respected.  Patients that are ill or otherwise may be at risk for sepsis will not have their spines accessed by neuraxial injections of any type.  This patient will not be offered these therapies if there is an increased risk.   We discussed that there is a risk of postprocedural pain and also a risk of worsening of clinical picture with these procedures as with any neuraxial procedure.    -------  Discussed with the patient that sedation is optional for this procedure.  The sedation offered is called conscious sedation which is different from general anesthesia that is utilized in surgical procedures. The dosing of the sedation is determined by the physician and they will be monitored throughout the procedure. With conscious sedation it is possible to remember parts or all of the procedure, this is normal. They will need to have a  with them as driving is prohibited following conscious sedation.     NPO instructions for conscious sedation:  --- Do not eat 8 hours prior to the procedure.   --- Do not drink any dairy or citrus 4 hours prior to the procedure.   --- Do not drink anything, including clear liquids, 2 hours prior to procedure.     If the NPO instructions are not followed then the procedure may be performed without sedation or the procedure will need to be rescheduled.   --- Follow-up for procedure    Thermal Radiofrequency Destruction  This initial thermal radiofrequency destruction (RFA) of cervical, thoracic, or lumbar paravertebral facet joint (medial branch) nerves is considered medically reasonable and necessary as this patient has met the criteria of having at least two (2) medically reasonable and necessary diagnostic MBBs, with each one providing a consistent minimum of 80% sustained relief of primary (index) pain (with the duration of relief being consistent with the agent used).       GIANA TINOCO  As the clinician, I personally reviewed  the GIANA from 3/25/25 while the patient was in the office today.    Dictated utilizing Dragon dictation.

## 2025-04-01 RX ORDER — FENTANYL CITRATE 50 UG/ML
50 INJECTION, SOLUTION INTRAMUSCULAR; INTRAVENOUS ONCE
Refills: 0 | Status: COMPLETED | OUTPATIENT
Start: 2025-04-01 | End: 2025-04-02

## 2025-04-01 RX ORDER — MIDAZOLAM HYDROCHLORIDE 1 MG/ML
1 INJECTION, SOLUTION INTRAMUSCULAR; INTRAVENOUS ONCE
Status: COMPLETED | OUTPATIENT
Start: 2025-04-01 | End: 2025-04-02

## 2025-04-01 NOTE — DISCHARGE INSTRUCTIONS
[facility] - Post-procedure Instructions          --  While there are no absolute restrictions, it is recommended that you do not perform strenuous activity today. In the morning, you may resume your level of activity as before your block.    --  If you have a band-aid at your injection site, please remove it later today. Observe the area for any redness, swelling, pus-like drainage, or a temperature over 101°. If any of these symptoms occur, please call your doctor at [phone]. If after office hours, leave a message and the on-call provider will return your call.    --  Ice may be applied to your injection site. It is recommended you avoid direct heat (heating pad; hot tub) for 1-2 days.    --  Call [facility] at [phone] if you experience persistent headache, persistent bleeding from the injection site, or severe pain not relieved by heat or oral medication.    --  Do not make important decisions today.    --  Due to the effects of the block and/or the I.V. Sedation, DO NOT drive or operate hazardous machinery for 12 hours.  Local anesthetics may cause numbness after procedure and precautions must be taken with regards to operating equipment as well as with walking, even if ambulating with assistance of another person or with an assistive device.    --  Do not drink alcohol for 12 hours.    -- You may return to work tomorrow, or as directed by your referring doctor.    --  Occasionally you may notice a slight increase in your pain after the procedure. This should start to improve within the next 24-48 hours. Radiofrequency ablation procedure pain may last 3-4 weeks.    --  It may take as long as 3-4 days before you notice a gradual improvement in your pain and/or other symptoms.    -- You may continue to take your prescribed pain medication as needed.    --  Some normal possible side effects of steroid use could include fluid retention, increased blood sugar, dull headache,  "increased sweating, increased appetite, mood swings and flushing.    --  Diabetics are recommended to watch their blood glucose level closely for 24-48 hours after the injection.    --  Must stay in PACU for 20 min upon arrival and prove no leg weakness before being discharged.    --  IN THE EVENT OF A LIFE THREATENING EMERGENCY, (CHEST PAIN, BREATHING DIFFICULTIES, PARALYSIS…) YOU SHOULD GO TO YOUR NEAREST EMERGENCY ROOM.    --  You should be contacted by our office within 2-3 days to schedule follow up or next appointment date.  If not contacted within 7 days, please call the office at [phone]     Radiofrequency ablation (RFA):     - Radiofrequency ablation is a term used to describe cauterization or \"burning.\"   - In pain management, we can use this technique with a special needle to target and destroy areas that are causing your pain.   - In most cases, you must have TWO successful \"test injections\" before you are a candidate for RFA.    After your RFA:   - Because heat is used in this technique, it is common to have soreness after the procedure.  Sometimes \"neuritis\" occurs, which feels like tingling, prickly, or sunburn under the skin sensations.   - Ice packs are helpful in decreasing this soreness and preventing post-procedure \"neuritis\" pain.  Use an ice pack for 20 minutes at a time at least 3 times the day of and the day after your procedure.   - It is common to have arm/leg numbness or weakness the day of your procedure, but this should wear off by the following day.   - It may take up to 6 weeks to gain full benefit from this procedure.   "

## 2025-04-01 NOTE — SIGNIFICANT NOTE
Patient educated on the following :    - If you are receiving Sedation for your procedure Nothing to Eat 8 hours and only clear liquids for 2 hours prior to your procedure.    -You will need to have someone drive you home after your PROCEDURE and remain with you for 24 hours after the PROCEDURE  - The date of your procedure, you will need your family member to remain at the facility the entire time you are here.  -You will need to arrive at 0745 on 4/2/25 PROCEDURE  -Please contact [facility] at: [phone] with any questions and/or concerns.

## 2025-04-02 ENCOUNTER — HOSPITAL ENCOUNTER (OUTPATIENT)
Dept: GENERAL RADIOLOGY | Facility: SURGERY CENTER | Age: 75
Setting detail: HOSPITAL OUTPATIENT SURGERY
End: 2025-04-02
Payer: MEDICARE

## 2025-04-02 ENCOUNTER — HOSPITAL ENCOUNTER (OUTPATIENT)
Facility: SURGERY CENTER | Age: 75
Setting detail: HOSPITAL OUTPATIENT SURGERY
Discharge: HOME | End: 2025-04-02
Attending: ANESTHESIOLOGY | Admitting: ANESTHESIOLOGY
Payer: MEDICARE

## 2025-04-02 VITALS
HEART RATE: 84 BPM | RESPIRATION RATE: 16 BRPM | OXYGEN SATURATION: 94 % | TEMPERATURE: 98.4 F | DIASTOLIC BLOOD PRESSURE: 79 MMHG | SYSTOLIC BLOOD PRESSURE: 137 MMHG

## 2025-04-02 DIAGNOSIS — M47.816 FACET ARTHRITIS, DEGENERATIVE, LUMBAR SPINE: ICD-10-CM

## 2025-04-02 DIAGNOSIS — M51.362 DEGENERATION OF INTERVERTEBRAL DISC OF LUMBAR REGION WITH DISCOGENIC BACK PAIN AND LOWER EXTREMITY PAIN: ICD-10-CM

## 2025-04-02 DIAGNOSIS — Z41.9 SURGERY, ELECTIVE: ICD-10-CM

## 2025-04-02 DIAGNOSIS — G89.29 CHRONIC BILATERAL LOW BACK PAIN WITHOUT SCIATICA: ICD-10-CM

## 2025-04-02 DIAGNOSIS — M54.50 CHRONIC BILATERAL LOW BACK PAIN WITHOUT SCIATICA: ICD-10-CM

## 2025-04-02 LAB — GLUCOSE BLDC GLUCOMTR-MCNC: 103 MG/DL (ref 70–130)

## 2025-04-02 PROCEDURE — 25010000002 FENTANYL CITRATE (PF) 50 MCG/ML SOLUTION: Performed by: ANESTHESIOLOGY

## 2025-04-02 PROCEDURE — 64636 DESTROY L/S FACET JNT ADDL: CPT | Performed by: ANESTHESIOLOGY

## 2025-04-02 PROCEDURE — 25010000002 LIDOCAINE PF 1% 1 % SOLUTION 5 ML VIAL: Performed by: ANESTHESIOLOGY

## 2025-04-02 PROCEDURE — 25010000002 LIDOCAINE PF 2% 2 % SOLUTION 5 ML VIAL: Performed by: ANESTHESIOLOGY

## 2025-04-02 PROCEDURE — 76000 FLUOROSCOPY <1 HR PHYS/QHP: CPT

## 2025-04-02 PROCEDURE — 25010000002 BUPIVACAINE (PF) 0.25 % SOLUTION 10 ML VIAL: Performed by: ANESTHESIOLOGY

## 2025-04-02 PROCEDURE — 64635 DESTROY LUMB/SAC FACET JNT: CPT | Performed by: ANESTHESIOLOGY

## 2025-04-02 PROCEDURE — 25010000002 MIDAZOLAM PER 1MG: Performed by: ANESTHESIOLOGY

## 2025-04-02 RX ORDER — SODIUM CHLORIDE 0.9 % (FLUSH) 0.9 %
10 SYRINGE (ML) INJECTION AS NEEDED
Status: DISCONTINUED | OUTPATIENT
Start: 2025-04-02 | End: 2025-04-02 | Stop reason: HOSPADM

## 2025-04-02 RX ORDER — SODIUM CHLORIDE 0.9 % (FLUSH) 0.9 %
10 SYRINGE (ML) INJECTION EVERY 12 HOURS SCHEDULED
Status: DISCONTINUED | OUTPATIENT
Start: 2025-04-02 | End: 2025-04-02 | Stop reason: HOSPADM

## 2025-04-02 RX ADMIN — MIDAZOLAM 1 MG: 1 INJECTION INTRAMUSCULAR; INTRAVENOUS at 08:55

## 2025-04-02 RX ADMIN — FENTANYL CITRATE 50 MCG: 50 INJECTION, SOLUTION INTRAMUSCULAR; INTRAVENOUS at 08:55

## 2025-05-13 ENCOUNTER — PREP FOR SURGERY (OUTPATIENT)
Dept: OTHER | Facility: HOSPITAL | Age: 75
End: 2025-05-13
Payer: MEDICARE

## 2025-05-13 ENCOUNTER — OFFICE VISIT (OUTPATIENT)
Age: 75
End: 2025-05-13
Payer: MEDICARE

## 2025-05-13 VITALS
OXYGEN SATURATION: 95 % | DIASTOLIC BLOOD PRESSURE: 84 MMHG | SYSTOLIC BLOOD PRESSURE: 126 MMHG | BODY MASS INDEX: 32.31 KG/M2 | WEIGHT: 175.6 LBS | HEIGHT: 62 IN | HEART RATE: 86 BPM | TEMPERATURE: 97.7 F

## 2025-05-13 DIAGNOSIS — M46.1 SI (SACROILIAC) JOINT INFLAMMATION: ICD-10-CM

## 2025-05-13 DIAGNOSIS — M54.50 CHRONIC BILATERAL LOW BACK PAIN WITHOUT SCIATICA: ICD-10-CM

## 2025-05-13 DIAGNOSIS — M53.3 SI (SACROILIAC) JOINT DYSFUNCTION: Primary | ICD-10-CM

## 2025-05-13 DIAGNOSIS — M53.3 CHRONIC LEFT SI JOINT PAIN: Primary | ICD-10-CM

## 2025-05-13 DIAGNOSIS — G89.29 CHRONIC BILATERAL LOW BACK PAIN WITHOUT SCIATICA: ICD-10-CM

## 2025-05-13 DIAGNOSIS — G89.29 CHRONIC LEFT SI JOINT PAIN: Primary | ICD-10-CM

## 2025-05-13 DIAGNOSIS — M53.3 SI (SACROILIAC) JOINT DYSFUNCTION: ICD-10-CM

## 2025-05-13 PROCEDURE — 1159F MED LIST DOCD IN RCRD: CPT

## 2025-05-13 PROCEDURE — 3079F DIAST BP 80-89 MM HG: CPT

## 2025-05-13 PROCEDURE — 1125F AMNT PAIN NOTED PAIN PRSNT: CPT

## 2025-05-13 PROCEDURE — 1160F RVW MEDS BY RX/DR IN RCRD: CPT

## 2025-05-13 PROCEDURE — 99214 OFFICE O/P EST MOD 30 MIN: CPT

## 2025-05-13 PROCEDURE — 3074F SYST BP LT 130 MM HG: CPT

## 2025-05-13 RX ORDER — DIAZEPAM 5 MG/1
10 TABLET ORAL ONCE
OUTPATIENT
Start: 2025-05-13

## 2025-05-13 NOTE — PROGRESS NOTES
CHIEF COMPLAINT  Back pain     Subjective   Joan Block is a 74 y.o. female  who presents to the office for follow-up of procedure.  She completed a Bilateral L4-S1 RFA on  4/2/25 performed by Dr. Ballesteros for management of low back pain. Patient reports 60% ongoing relief from the procedure. She reports that her pain level fluctuates based on activity level but overall she notes improvement to her pain and is able to rest bettter at night.     Today pain is 3/10VAS in severity (severity in pain varies based on activity level). Her main complaint today is left sided low back pain that appears to be localized over her sacroiliac joint. Pain will intermittent radiate darlene left hip/buttock. She denies radicular pain at this time. Describes this pain as a nearly continuous ache. Pain is worsened by prolonged standing/standing, walking long distances, and bending. Pain improves with rest/reposition, use of a heating pad, and medications. She utilizes magnesium supplements and alternates OTC Tylenol or Ibuprofen PRN.      She has completed physical, chiropractic, and red light therapy in the past with no relief.      She is followed by Dr. Norman Brown with orthopedics. She received a Left GTB injection in October that offered no relief.      Procedures:  4/2/25 - Bilateral L4-S1 RFA - 60% ongoing relief   3/18/25 - Bilateral L4-S1 MBB - 100% relief x 8 hours  2/18/25 - Bilateral L4-S1 MBB - 100% relief x 8 hours   1/14/25 - L4/L5 LESI - 100% relief to sciatic pain    Back Pain  This is a chronic problem. The current episode started more than 1 year ago. The problem occurs constantly. The problem has been coming and going since onset. The pain is present in the lumbar spine and sacro-iliac. The quality of the pain is described as aching. The pain radiates to the right thigh and right foot (right lateral thigh). The pain is at a severity of 3/10. The pain is moderate. The symptoms are aggravated by standing, sitting,  "bending, position and twisting. Pertinent negatives include no abdominal pain, chest pain, dysuria, fever, headaches, numbness or weakness. She has tried heat, muscle relaxant, analgesics and NSAIDs for the symptoms.      PEG Assessment   What number best describes your pain on average in the past week?6  What number best describes how, during the past week, pain has interfered with your enjoyment of life?6  What number best describes how, during the past week, pain has interfered with your general activity?  8    Review of Pertinent Medical Data ---      The following portions of the patient's history were reviewed and updated as appropriate: allergies, current medications, past family history, past medical history, past social history, past surgical history, and problem list.    Review of Systems   Constitutional:  Positive for activity change (decreased). Negative for chills, fatigue and fever.   HENT:  Negative for congestion.    Eyes:  Negative for visual disturbance.   Respiratory:  Negative for chest tightness and shortness of breath.    Cardiovascular:  Negative for chest pain.   Gastrointestinal:  Negative for abdominal pain, constipation and diarrhea.   Genitourinary:  Negative for difficulty urinating, dyspareunia and dysuria.   Musculoskeletal:  Positive for back pain.   Neurological:  Negative for dizziness, weakness, light-headedness, numbness and headaches.   Psychiatric/Behavioral:  Negative for agitation, self-injury, sleep disturbance and suicidal ideas. The patient is not nervous/anxious.      I have reviewed and confirmed the accuracy of the ROS as documented by the MA/LPN/RN TRU Morel    Vitals:    05/13/25 1313   BP: 126/84   Pulse: 86   Temp: 97.7 °F (36.5 °C)   SpO2: 95%   Weight: 79.7 kg (175 lb 9.6 oz)   Height: 157.5 cm (62\")   PainSc: 3    PainLoc: Hip  Comment: left     Objective   Physical Exam  Constitutional:       Appearance: Normal appearance.   HENT:      Head: " Normocephalic.   Cardiovascular:      Rate and Rhythm: Normal rate and regular rhythm.   Pulmonary:      Effort: Pulmonary effort is normal.      Breath sounds: Normal breath sounds.   Musculoskeletal:      Cervical back: Normal range of motion.      Lumbar back: Tenderness and bony tenderness present. Decreased range of motion.      Comments: + Left ANTOINE's test  + Left SI joint tenderness   + Left SI compression test  + Left Gaenslen's test   + lumbar facet loading/tenderness   Skin:     General: Skin is warm and dry.      Capillary Refill: Capillary refill takes less than 2 seconds.   Neurological:      General: No focal deficit present.      Mental Status: She is alert and oriented to person, place, and time.   Psychiatric:         Mood and Affect: Mood normal.         Behavior: Behavior normal.         Thought Content: Thought content normal.         Cognition and Memory: Cognition normal.       Assessment & Plan   Diagnoses and all orders for this visit:    1. Chronic left SI joint pain (Primary)    2. SI (sacroiliac) joint inflammation    3. Chronic bilateral low back pain without sciatica    4. SI (sacroiliac) joint dysfunction      Joan Fletcherian reports a pain score of 3.  Given her pain assessment as noted, treatment options were discussed and the following options were decided upon as a follow-up plan to address the patient's pain: continuation of current treatment plan for pain and steroid injections.    --- Left SI Joint Injection   ----------  Education about Sacroiliac joint injections:  This Sacroiliac joint injection (blockade) we have suggested is intended for diagnostic purposes, with the intent of offering the patient Radiofrequency thermal rhizotomy (RF) of the sensory branches to the joint if the block is diagnostically effective.  The diagnostic blockade is necessary to determine the likelihood that RF therapy could be efficacious in providing long term relief to the patient.    In this  procedure, the sacroiliac joint is aligned with imaging, and under image guidance a needle is placed with the needle tip into the joint.  The needle position is confirmed to be appropriately in the joint before injection of medication into the joint.  When xray fluoroscopy is used, contrast dye is used to confirm a proper arthrogram (i.e., outline of the joint).  When ultrasound is used, IV fluid (normal saline) is injected to see the flow of the fluid into the joint.  Once confirmed, then the medication can be injected into the joint.  Oftentimes this medication is a combination of local anesthetics (for diagnostic purposes) and also a steroid (to decrease irritation & inflammation in the joint, also known as sacroilitis).      Medically, a successful RF procedure should provide a patient with 50% pain relief or more for at least 6 months.  Clinical experience suggests that successful patients receive relief more in the range of 12 months on average.  We also discussed that many patients receive therapeutic success from the intraarticular joint injection, and may not require RF ablation.  If a patient receives more than 8 weeks of relief from joint injection, then occasional repeat joint blocks for therapeutic purposes is a very reasonable alternative therapy.  This course of therapy is consistent with our LCDs according to our CMS  in the area, and therefore other insurance providers should follow accordingly.  We will monitor our patients to screen for these therapeutic responders and will offer RF therapy only when necessary.      We discussed that joint injections & also RF procedures are not without risks.  Best practices regarding anticoagulant use & neuraxial procedures will be respected.  Oftentimes a patient on an anticoagulant can be offered a joint injection safely, but again this is not risk-free, and such patients give consent with regards to this increased bleeding risk, which could cause  problems including but not limited to worsening of pain, nerve damage, or muscle damage.  Patients that are ill or otherwise may be at risk for sepsis will not have their spines accessed by neuraxial injections of any type.  This patient will not be offered these therapies if there is an increased risk.   We discussed that there is a risk of postprocedural pain and also a risk of worsening of clinical picture with these procedures as with any neuraxial procedure.    ----------  Discussed with the patient that sedation is optional for this procedure.  The sedation offered is called conscious sedation which is different from general anesthesia that is utilized in surgical procedures. The dosing of the sedation is determined by the physician and they will be monitored throughout the procedure. With conscious sedation it is possible to remember parts or all of the procedure, this is normal. They will need to have a  with them as driving is prohibited following conscious sedation.     NPO instructions for conscious sedation:  --- Do not eat 8 hours prior to the procedure.   --- Do not drink any dairy or citrus 4 hours prior to the procedure.   --- Do not drink anything, including clear liquids, 2 hours prior to procedure.     If the NPO instructions are not followed then the procedure may be performed without sedation or the procedure will need to be rescheduled.   --- Follow-up for procedure      GIANA REPORT  As the clinician, I personally reviewed the GIANA from 5/13/25 while the patient was in the office today.    Dictated utilizing Dragon dictation.

## 2025-07-02 ENCOUNTER — OFFICE VISIT (OUTPATIENT)
Dept: INTERNAL MEDICINE | Facility: CLINIC | Age: 75
End: 2025-07-02
Payer: MEDICARE

## 2025-07-02 VITALS
DIASTOLIC BLOOD PRESSURE: 74 MMHG | BODY MASS INDEX: 33.01 KG/M2 | WEIGHT: 179.4 LBS | TEMPERATURE: 96.7 F | HEART RATE: 75 BPM | HEIGHT: 62 IN | SYSTOLIC BLOOD PRESSURE: 120 MMHG | OXYGEN SATURATION: 97 %

## 2025-07-02 DIAGNOSIS — R32 URINARY INCONTINENCE, UNSPECIFIED TYPE: ICD-10-CM

## 2025-07-02 DIAGNOSIS — R30.0 DYSURIA: Primary | ICD-10-CM

## 2025-07-02 DIAGNOSIS — E78.2 MIXED HYPERLIPIDEMIA: Chronic | ICD-10-CM

## 2025-07-02 DIAGNOSIS — N89.8 VAGINAL ITCHING: ICD-10-CM

## 2025-07-02 DIAGNOSIS — I10 ESSENTIAL HYPERTENSION: Chronic | ICD-10-CM

## 2025-07-02 DIAGNOSIS — R73.03 PREDIABETES: ICD-10-CM

## 2025-07-02 DIAGNOSIS — E11.9 TYPE 2 DIABETES MELLITUS WITHOUT COMPLICATION, WITHOUT LONG-TERM CURRENT USE OF INSULIN: Primary | ICD-10-CM

## 2025-07-02 LAB
BILIRUB BLD-MCNC: NEGATIVE MG/DL
CLARITY, POC: ABNORMAL
COLOR UR: YELLOW
EXPIRATION DATE: ABNORMAL
EXPIRATION DATE: NORMAL
GLUCOSE UR STRIP-MCNC: NEGATIVE MG/DL
KETONES UR QL: NEGATIVE
LEUKOCYTE EST, POC: ABNORMAL
Lab: ABNORMAL
Lab: NORMAL
NITRITE UR-MCNC: NEGATIVE MG/ML
PH UR: 5 [PH] (ref 5–8)
POC ALBUMIN, URINE: 80 MG/L
POC CREATININE, URINE: 200 MG/DL
POC URINE ALB/CREA RATIO: <30
PROT UR STRIP-MCNC: NEGATIVE MG/DL
RBC # UR STRIP: NEGATIVE /UL
SP GR UR: 1.02 (ref 1–1.03)
UROBILINOGEN UR QL: NORMAL

## 2025-07-02 PROCEDURE — 1159F MED LIST DOCD IN RCRD: CPT | Performed by: INTERNAL MEDICINE

## 2025-07-02 PROCEDURE — 82044 UR ALBUMIN SEMIQUANTITATIVE: CPT | Performed by: INTERNAL MEDICINE

## 2025-07-02 PROCEDURE — 82570 ASSAY OF URINE CREATININE: CPT | Performed by: INTERNAL MEDICINE

## 2025-07-02 PROCEDURE — 99214 OFFICE O/P EST MOD 30 MIN: CPT | Performed by: INTERNAL MEDICINE

## 2025-07-02 PROCEDURE — 3078F DIAST BP <80 MM HG: CPT | Performed by: INTERNAL MEDICINE

## 2025-07-02 PROCEDURE — 81003 URINALYSIS AUTO W/O SCOPE: CPT | Performed by: INTERNAL MEDICINE

## 2025-07-02 PROCEDURE — 1125F AMNT PAIN NOTED PAIN PRSNT: CPT | Performed by: INTERNAL MEDICINE

## 2025-07-02 PROCEDURE — 3074F SYST BP LT 130 MM HG: CPT | Performed by: INTERNAL MEDICINE

## 2025-07-02 PROCEDURE — 3044F HG A1C LEVEL LT 7.0%: CPT | Performed by: INTERNAL MEDICINE

## 2025-07-02 PROCEDURE — 1160F RVW MEDS BY RX/DR IN RCRD: CPT | Performed by: INTERNAL MEDICINE

## 2025-07-02 RX ORDER — LISINOPRIL 40 MG/1
40 TABLET ORAL DAILY
Qty: 90 TABLET | Refills: 3 | Status: SHIPPED | OUTPATIENT
Start: 2025-07-02

## 2025-07-02 RX ORDER — AMLODIPINE BESYLATE 5 MG/1
5 TABLET ORAL DAILY
Qty: 90 TABLET | Refills: 3 | Status: SHIPPED | OUTPATIENT
Start: 2025-07-02

## 2025-07-02 NOTE — PROGRESS NOTES
"Chief Complaint  Med Refill, Diabetes (Follow up), and Vaginal Itching    Subjective        Joan Block presents to [facility]  History of Present Illness    Doing okay, but is having some intermittent vaginal irritation.  Using soap currently internally and recommend she stop this.     Objective   Vital Signs:  /74 (BP Location: Left arm, Patient Position: Sitting, Cuff Size: Adult)   Pulse 75   Temp 96.7 °F (35.9 °C) (Infrared)   Ht 157.5 cm (62\")   Wt 81.4 kg (179 lb 6.4 oz)   SpO2 97%   BMI 32.81 kg/m²   Estimated body mass index is 32.81 kg/m² as calculated from the following:    Height as of this encounter: 157.5 cm (62\").    Weight as of this encounter: 81.4 kg (179 lb 6.4 oz).      Physical Exam  Vitals reviewed.   Constitutional:       General: She is not in acute distress.     Appearance: Normal appearance.   HENT:      Head: Normocephalic and atraumatic.      Nose: Nose normal.      Mouth/Throat:      Mouth: Mucous membranes are moist.   Eyes:      Conjunctiva/sclera: Conjunctivae normal.   Pulmonary:      Effort: Pulmonary effort is normal.   Skin:     General: Skin is warm and dry.   Neurological:      General: No focal deficit present.      Mental Status: She is alert.   Psychiatric:         Mood and Affect: Mood normal.        Result Review :           Assessment and Plan   Diagnoses and all orders for this visit:    1. Type 2 diabetes mellitus without complication, without long-term current use of insulin (Primary)  -     CBC (No Diff)  -     Comprehensive Metabolic Panel  -     Hemoglobin A1c  -     Lipid Panel With LDL/HDL Ratio    2. Prediabetes  -     POC Albumin/Creatinine Ratio Urine    3. Vaginal itching  -     POCT urinalysis dipstick, automated  -     Ambulatory Referral to Physical Therapy for Evaluation & Treatment  -     NuSwab BV & Candida - Urine, Urine, Clean Catch    4. Urinary incontinence, unspecified type  -     Ambulatory Referral " to Physical Therapy for Evaluation & Treatment  -     Urine Culture - Urine, Urine, Clean Catch  -     NuSwab BV & Candida - Urine, Urine, Clean Catch    5. Mixed hyperlipidemia  -     Lipid Panel With LDL/HDL Ratio    6. Essential hypertension  -     lisinopril (PRINIVIL,ZESTRIL) 40 MG tablet; Take 1 tablet by mouth Daily.  Dispense: 90 tablet; Refill: 3  -     amLODIPine (NORVASC) 5 MG tablet; Take 1 tablet by mouth Daily.  Dispense: 90 tablet; Refill: 3    Other orders  -     Request Problem             Follow Up   Return in about 11 months (around 5/22/2026) for Medicare Wellness.  Patient was given instructions and counseling regarding her condition or for health maintenance advice. Please see specific information pulled into the AVS if appropriate.

## 2025-07-03 ENCOUNTER — RESULTS FOLLOW-UP (OUTPATIENT)
Dept: INTERNAL MEDICINE | Facility: CLINIC | Age: 75
End: 2025-07-03
Payer: MEDICARE

## 2025-07-03 LAB
A VAGINAE DNA VAG QL NAA+PROBE: NORMAL SCORE
ALBUMIN SERPL-MCNC: 4.2 G/DL (ref 3.8–4.8)
ALP SERPL-CCNC: 61 IU/L (ref 44–121)
ALT SERPL-CCNC: 17 IU/L (ref 0–32)
AST SERPL-CCNC: 18 IU/L (ref 0–40)
BILIRUB SERPL-MCNC: 0.4 MG/DL (ref 0–1.2)
BUN SERPL-MCNC: 21 MG/DL (ref 8–27)
BUN/CREAT SERPL: 22 (ref 12–28)
BVAB2 DNA VAG QL NAA+PROBE: NORMAL
C ALBICANS DNA VAG QL NAA+PROBE: NORMAL
C GLABRATA DNA VAG QL NAA+PROBE: NORMAL
CALCIUM SERPL-MCNC: 9.8 MG/DL (ref 8.7–10.3)
CHLORIDE SERPL-SCNC: 105 MMOL/L (ref 96–106)
CHOLEST SERPL-MCNC: 239 MG/DL (ref 100–199)
CO2 SERPL-SCNC: 21 MMOL/L (ref 20–29)
CREAT SERPL-MCNC: 0.97 MG/DL (ref 0.57–1)
EGFRCR SERPLBLD CKD-EPI 2021: 62 ML/MIN/1.73
ERYTHROCYTE [DISTWIDTH] IN BLOOD BY AUTOMATED COUNT: 14.2 % (ref 11.7–15.4)
GLOBULIN SER CALC-MCNC: 2.4 G/DL (ref 1.5–4.5)
GLUCOSE SERPL-MCNC: 89 MG/DL (ref 70–99)
HBA1C MFR BLD: 5.9 % (ref 4.8–5.6)
HCT VFR BLD AUTO: 40.3 % (ref 34–46.6)
HDLC SERPL-MCNC: 56 MG/DL
HGB BLD-MCNC: 12.8 G/DL (ref 11.1–15.9)
LDLC SERPL CALC-MCNC: 167 MG/DL (ref 0–99)
LDLC/HDLC SERPL: 3 RATIO (ref 0–3.2)
MCH RBC QN AUTO: 28.3 PG (ref 26.6–33)
MCHC RBC AUTO-ENTMCNC: 31.8 G/DL (ref 31.5–35.7)
MCV RBC AUTO: 89 FL (ref 79–97)
MEGA1 DNA VAG QL NAA+PROBE: NORMAL
PLATELET # BLD AUTO: 301 X10E3/UL (ref 150–450)
POTASSIUM SERPL-SCNC: 4.4 MMOL/L (ref 3.5–5.2)
PROT SERPL-MCNC: 6.6 G/DL (ref 6–8.5)
RBC # BLD AUTO: 4.52 X10E6/UL (ref 3.77–5.28)
REQUEST PROBLEM: NORMAL
SODIUM SERPL-SCNC: 140 MMOL/L (ref 134–144)
TRIGL SERPL-MCNC: 89 MG/DL (ref 0–149)
VLDLC SERPL CALC-MCNC: 16 MG/DL (ref 5–40)
WBC # BLD AUTO: 7.9 X10E3/UL (ref 3.4–10.8)

## 2025-07-04 LAB
BACTERIA UR CULT: NORMAL
BACTERIA UR CULT: NORMAL

## 2025-07-08 ENCOUNTER — TELEPHONE (OUTPATIENT)
Dept: INTERNAL MEDICINE | Facility: CLINIC | Age: 75
End: 2025-07-08
Payer: MEDICARE

## 2025-07-08 NOTE — TELEPHONE ENCOUNTER
[facility] in [place] calling    Reason for call:  had referred the patient to [facility] for Pelvic floor therapy. They do not offer pelvic floor therapy at that location. The patient has been sent to the [place] location.

## (undated) DEVICE — DRSNG TELFA PAD NONADH STR 1S 3X8IN

## (undated) DEVICE — SPNG GZ WOVN 4X4IN 12PLY 10/BX STRL

## (undated) DEVICE — BNDG COBAN S/ADHR WRP LF 4IN 5YD TN

## (undated) DEVICE — NDL SPINE 22G 31/2IN BLK

## (undated) DEVICE — GLV SURG SENSICARE ORTHO PF LF 8 STRL

## (undated) DEVICE — BNDG ELAS MATRX V/CLS 3INX5YD LF

## (undated) DEVICE — SKIN PREP TRAY W/CHG: Brand: MEDLINE INDUSTRIES, INC.

## (undated) DEVICE — SYR CONTRL LUERLOK 10CC

## (undated) DEVICE — FRAZIER SURGICAL SUCTION INSTRUMENT: Brand: ARGYLE

## (undated) DEVICE — 3M™ IOBAN™ 2 ANTIMICROBIAL INCISE DRAPE 6650EZ: Brand: IOBAN™ 2

## (undated) DEVICE — BNDG ELAS MATRX V/CLS 4IN 5YD LF

## (undated) DEVICE — BNDG ESMARK 4IN 9FT LF STRL BLU

## (undated) DEVICE — CONVERTORS STOCKINETTE: Brand: CONVERTORS

## (undated) DEVICE — SYS CLS SKIN PREMIERPRO EXOFINFUSION 22CM

## (undated) DEVICE — DISPOSABLE TOURNIQUET CUFF SINGLE BLADDER, SINGLE PORT AND LUER LOCK CONNECTOR: Brand: COLOR CUFF

## (undated) DEVICE — GLV SURG TRIUMPH PF LTX 7 STRL

## (undated) DEVICE — SPLNT 1 STEP 4INX30IN

## (undated) DEVICE — GLV SURG SIGNATURE ESSENTIAL PF LTX SZ8

## (undated) DEVICE — BANDAGE,GAUZE,BULKEE II,4.5"X4.1YD,STRL: Brand: MEDLINE

## (undated) DEVICE — IRRIGATOR BULB ASEPTO 60CC STRL

## (undated) DEVICE — CONTAINER,SPECIMEN,OR STERILE,4OZ: Brand: MEDLINE

## (undated) DEVICE — DRILL BIT, AO, SCALED: Brand: VARIAX

## (undated) DEVICE — ARM SLING II: Brand: DEROYAL

## (undated) DEVICE — 450 ML BOTTLE OF 0.05% CHLORHEXIDINE GLUCONATE IN 99.95% STERILE WATER FOR IRRIGATION, USP AND APPLICATOR.: Brand: IRRISEPT ANTIMICROBIAL WOUND LAVAGE

## (undated) DEVICE — PAD GRND CATHAY W/CABL DISP

## (undated) DEVICE — DRP C/ARM 41X74IN

## (undated) DEVICE — TRAP FLD MINIVAC MEGADYNE 100ML

## (undated) DEVICE — Device

## (undated) DEVICE — DRSNG PAD ABD 8X10IN STRL

## (undated) DEVICE — PENCL E/S ULTRAVAC TELESCP NOSE HOLSTR 10FT

## (undated) DEVICE — TOWEL,OR,DSP,ST,BLUE,STD,4/PK,20PK/CS: Brand: MEDLINE

## (undated) DEVICE — PK BASIC ORTHO 90

## (undated) DEVICE — EPIDURAL TRAY: Brand: MEDLINE INDUSTRIES, INC.

## (undated) DEVICE — APPL CHLORAPREP HI/LITE 26ML ORNG

## (undated) DEVICE — SUT VIC 2/0 CP2 CR8 18IN J762D

## (undated) DEVICE — GLV SURG NEOLON 2G PF LF 8 STRL